# Patient Record
Sex: FEMALE | Race: OTHER | URBAN - METROPOLITAN AREA
[De-identification: names, ages, dates, MRNs, and addresses within clinical notes are randomized per-mention and may not be internally consistent; named-entity substitution may affect disease eponyms.]

---

## 2017-01-27 ENCOUNTER — EMERGENCY (EMERGENCY)
Facility: HOSPITAL | Age: 62
LOS: 0 days | Discharge: HOME | End: 2017-01-27
Admitting: EMERGENCY MEDICINE
Payer: MEDICAID

## 2017-01-27 DIAGNOSIS — J20.9 ACUTE BRONCHITIS, UNSPECIFIED: ICD-10-CM

## 2017-01-27 DIAGNOSIS — R50.9 FEVER, UNSPECIFIED: ICD-10-CM

## 2017-01-27 PROCEDURE — 99284 EMERGENCY DEPT VISIT MOD MDM: CPT

## 2017-01-27 PROCEDURE — 71020: CPT | Mod: 26

## 2017-01-29 ENCOUNTER — EMERGENCY (EMERGENCY)
Facility: HOSPITAL | Age: 62
LOS: 0 days | Discharge: HOME | End: 2017-01-29
Attending: EMERGENCY MEDICINE | Admitting: EMERGENCY MEDICINE
Payer: MEDICAID

## 2017-01-29 VITALS
SYSTOLIC BLOOD PRESSURE: 158 MMHG | WEIGHT: 165.35 LBS | TEMPERATURE: 98 F | HEART RATE: 100 BPM | HEIGHT: 60 IN | DIASTOLIC BLOOD PRESSURE: 68 MMHG | RESPIRATION RATE: 19 BRPM

## 2017-01-29 DIAGNOSIS — J11.1 INFLUENZA DUE TO UNIDENTIFIED INFLUENZA VIRUS WITH OTHER RESPIRATORY MANIFESTATIONS: ICD-10-CM

## 2017-01-29 DIAGNOSIS — R05 COUGH: ICD-10-CM

## 2017-01-29 LAB
ALBUMIN SERPL ELPH-MCNC: 3.8 G/DL — SIGNIFICANT CHANGE UP (ref 3.3–5)
ALP SERPL-CCNC: 109 U/L — SIGNIFICANT CHANGE UP (ref 40–120)
ALT FLD-CCNC: 26 U/L — SIGNIFICANT CHANGE UP (ref 12–78)
ANION GAP SERPL CALC-SCNC: 9 MMOL/L — SIGNIFICANT CHANGE UP (ref 5–17)
AST SERPL-CCNC: 22 U/L — SIGNIFICANT CHANGE UP (ref 15–37)
BASOPHILS # BLD AUTO: 0.1 K/UL — SIGNIFICANT CHANGE UP (ref 0–0.2)
BASOPHILS NFR BLD AUTO: 0.6 % — SIGNIFICANT CHANGE UP (ref 0–2)
BILIRUB SERPL-MCNC: 0.2 MG/DL — SIGNIFICANT CHANGE UP (ref 0.2–1.2)
BUN SERPL-MCNC: 14 MG/DL — SIGNIFICANT CHANGE UP (ref 7–23)
CALCIUM SERPL-MCNC: 9.1 MG/DL — SIGNIFICANT CHANGE UP (ref 8.5–10.1)
CHLORIDE SERPL-SCNC: 96 MMOL/L — SIGNIFICANT CHANGE UP (ref 96–108)
CK SERPL-CCNC: 135 U/L — SIGNIFICANT CHANGE UP (ref 26–192)
CO2 SERPL-SCNC: 28 MMOL/L — SIGNIFICANT CHANGE UP (ref 22–31)
CREAT SERPL-MCNC: 0.7 MG/DL — SIGNIFICANT CHANGE UP (ref 0.5–1.3)
EOSINOPHIL # BLD AUTO: 0 K/UL — SIGNIFICANT CHANGE UP (ref 0–0.5)
EOSINOPHIL NFR BLD AUTO: 0.3 % — SIGNIFICANT CHANGE UP (ref 0–6)
FLUAV H1 2009 PAND RNA SPEC QL NAA+PROBE: DETECTED
GLUCOSE SERPL-MCNC: 269 MG/DL — HIGH (ref 70–99)
HCT VFR BLD CALC: 42.8 % — SIGNIFICANT CHANGE UP (ref 34.5–45)
HGB BLD-MCNC: 14.5 G/DL — SIGNIFICANT CHANGE UP (ref 11.5–15.5)
LYMPHOCYTES # BLD AUTO: 1.1 K/UL — SIGNIFICANT CHANGE UP (ref 1–3.3)
LYMPHOCYTES # BLD AUTO: 10.8 % — LOW (ref 13–44)
MCHC RBC-ENTMCNC: 30.2 PG — SIGNIFICANT CHANGE UP (ref 27–34)
MCHC RBC-ENTMCNC: 33.9 GM/DL — SIGNIFICANT CHANGE UP (ref 32–36)
MCV RBC AUTO: 88.9 FL — SIGNIFICANT CHANGE UP (ref 80–100)
MONOCYTES # BLD AUTO: 0.4 K/UL — SIGNIFICANT CHANGE UP (ref 0–0.9)
MONOCYTES NFR BLD AUTO: 4.2 % — SIGNIFICANT CHANGE UP (ref 2–14)
NEUTROPHILS # BLD AUTO: 8.8 K/UL — HIGH (ref 1.8–7.4)
NEUTROPHILS NFR BLD AUTO: 84.2 % — HIGH (ref 43–77)
NT-PROBNP SERPL-SCNC: 32 PG/ML — SIGNIFICANT CHANGE UP (ref 0–125)
PLATELET # BLD AUTO: 278 K/UL — SIGNIFICANT CHANGE UP (ref 150–400)
POTASSIUM SERPL-MCNC: 4 MMOL/L — SIGNIFICANT CHANGE UP (ref 3.5–5.3)
POTASSIUM SERPL-SCNC: 4 MMOL/L — SIGNIFICANT CHANGE UP (ref 3.5–5.3)
PROT SERPL-MCNC: 8.3 GM/DL — SIGNIFICANT CHANGE UP (ref 6–8.3)
RAPID RVP RESULT: DETECTED
RBC # BLD: 4.81 M/UL — SIGNIFICANT CHANGE UP (ref 3.8–5.2)
RBC # FLD: 12 % — SIGNIFICANT CHANGE UP (ref 10.3–14.5)
SODIUM SERPL-SCNC: 133 MMOL/L — LOW (ref 135–145)
TROPONIN I SERPL-MCNC: <0.015 NG/ML — SIGNIFICANT CHANGE UP (ref 0.01–0.04)
WBC # BLD: 10.4 K/UL — SIGNIFICANT CHANGE UP (ref 3.8–10.5)
WBC # FLD AUTO: 10.4 K/UL — SIGNIFICANT CHANGE UP (ref 3.8–10.5)

## 2017-01-29 PROCEDURE — 99284 EMERGENCY DEPT VISIT MOD MDM: CPT

## 2017-01-29 PROCEDURE — 71020: CPT | Mod: 26

## 2017-01-29 PROCEDURE — 93010 ELECTROCARDIOGRAM REPORT: CPT

## 2017-01-29 RX ORDER — ACETAMINOPHEN 500 MG
650 TABLET ORAL ONCE
Qty: 0 | Refills: 0 | Status: DISCONTINUED | OUTPATIENT
Start: 2017-01-29 | End: 2017-01-29

## 2017-01-29 RX ORDER — SODIUM CHLORIDE 9 MG/ML
1000 INJECTION INTRAMUSCULAR; INTRAVENOUS; SUBCUTANEOUS ONCE
Qty: 0 | Refills: 0 | Status: COMPLETED | OUTPATIENT
Start: 2017-01-29 | End: 2017-01-29

## 2017-01-29 RX ORDER — IBUPROFEN 200 MG
600 TABLET ORAL ONCE
Qty: 0 | Refills: 0 | Status: COMPLETED | OUTPATIENT
Start: 2017-01-29 | End: 2017-01-29

## 2017-01-29 RX ORDER — IPRATROPIUM/ALBUTEROL SULFATE 18-103MCG
3 AEROSOL WITH ADAPTER (GRAM) INHALATION ONCE
Qty: 0 | Refills: 0 | Status: COMPLETED | OUTPATIENT
Start: 2017-01-29 | End: 2017-01-29

## 2017-01-29 RX ADMIN — Medication 3 MILLILITER(S): at 19:51

## 2017-01-29 RX ADMIN — Medication 600 MILLIGRAM(S): at 19:50

## 2017-01-29 RX ADMIN — SODIUM CHLORIDE 1000 MILLILITER(S): 9 INJECTION INTRAMUSCULAR; INTRAVENOUS; SUBCUTANEOUS at 19:50

## 2017-01-29 NOTE — ED STATDOCS - ATTENDING CONTRIBUTION TO CARE
I, Praful Tucker, performed the initial face to face bedside interview with this patient regarding history of present illness, review of symptoms and relevant past medical, social and family history.  I completed an independent physical examination.  I was the initial provider who evaluated this patient. I have signed out the follow up of any pending tests (i.e. labs, radiological studies) to the ACP.  I have communicated the patient’s plan of care and disposition with the ACP.  The history, relevant review of systems, past medical and surgical history, medical decision making, and physical examination was documented by the scribe in my presence and I attest to the accuracy of the documentation.

## 2017-01-29 NOTE — ED ADULT NURSE NOTE - CHIEF COMPLAINT
The patient is a 61y Female complaining of cough x few days.  Pt also c/o chest pain.  Pt not coughing up sputum.  No other complaints noted, denies fever.  Safety maintained

## 2017-01-29 NOTE — ED STATDOCS - PROGRESS NOTE DETAILS
PA NOTE: Pt seen by intake physician and hpi/orders/plan reviewed. PT presenting to ED with complaints ofcough associated with Chest pain. Pt was seen in ED 2 days ago and dc hoem on zithromax. Pt returns with similar complaints of mylagias, cough and associated CP with cough.  PE: GEN: Awake, alert,  NAD,  EYES: PERRL CARDIAC: Reg rate and rhythm, S1,S2, RRR  RESP: No distress noted. Lungs CTA bilaterally no wheeze, ronchi, rales. ABD: soft,  non-tender, no guarding. . NEURO: AOx3, no focal deficits   PLAN:CXR/Meds/Labs and reeval. Pt has her symptoms ofr more than 48 hrs, unable to RX tamiflu. Advised to followup with her PMD. PA NOTE: Shelly MENENDEZ, saw and examined the pt. Pt seen by intake physician and hpi/orders/plan reviewed. PT presenting to ED with complaints ofcough associated with Chest pain. Pt was seen in ED 2 days ago and dc hoem on zithromax. Pt returns with similar complaints of mylagias, cough and associated CP with cough.  PE: GEN: Awake, alert,  NAD,  EYES: PERRL CARDIAC: Reg rate and rhythm, S1,S2, RRR  RESP: No distress noted. Lungs CTA bilaterally no wheeze, ronchi, rales. ABD: soft,  non-tender, no guarding. . NEURO: AOx3, no focal deficits   PLAN:CXR/Meds/Labs and reeval. Pt has her symptoms for days more than 48 hrs, will not benefit from RX of tamiflu. Advised to followup with her PMD.  Hydration.  Symptomatic tx.

## 2017-01-29 NOTE — ED ADULT NURSE NOTE - LANGUAGE ASSISTANCE NEEDED
No-Patient/Caregiver offered and refused free interpretation services./Pt able to speak enough English to understand

## 2018-10-21 ENCOUNTER — EMERGENCY (EMERGENCY)
Facility: HOSPITAL | Age: 63
LOS: 0 days | Discharge: HOME | End: 2018-10-21
Attending: EMERGENCY MEDICINE
Payer: SELF-PAY

## 2018-10-21 VITALS
TEMPERATURE: 98 F | HEART RATE: 69 BPM | RESPIRATION RATE: 18 BRPM | DIASTOLIC BLOOD PRESSURE: 69 MMHG | OXYGEN SATURATION: 97 % | SYSTOLIC BLOOD PRESSURE: 158 MMHG

## 2018-10-21 VITALS — WEIGHT: 199.96 LBS
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
## 2018-10-21 DIAGNOSIS — M54.5 LOW BACK PAIN: ICD-10-CM

## 2018-10-21 DIAGNOSIS — E11.9 TYPE 2 DIABETES MELLITUS WITHOUT COMPLICATIONS: ICD-10-CM

## 2018-10-21 DIAGNOSIS — I10 ESSENTIAL (PRIMARY) HYPERTENSION: ICD-10-CM

## 2018-10-21 DIAGNOSIS — E78.5 HYPERLIPIDEMIA, UNSPECIFIED: ICD-10-CM

## 2018-10-21 PROCEDURE — 99283 EMERGENCY DEPT VISIT LOW MDM: CPT

## 2018-10-21 PROCEDURE — 72100 X-RAY EXAM L-S SPINE 2/3 VWS: CPT | Mod: 26

## 2018-10-21 RX ORDER — METOPROLOL TARTRATE 50 MG
1 TABLET ORAL
Qty: 14 | Refills: 0
Start: 2018-10-21

## 2018-10-21 RX ORDER — AMLODIPINE BESYLATE 2.5 MG/1
1 TABLET ORAL
Qty: 14 | Refills: 0
Start: 2018-10-21 | End: 2018-11-03

## 2018-10-21 RX ORDER — ATORVASTATIN CALCIUM 80 MG/1
1 TABLET, FILM COATED ORAL
Qty: 14 | Refills: 0
Start: 2018-10-21

## 2018-10-21 RX ORDER — CYCLOBENZAPRINE HYDROCHLORIDE 10 MG/1
1 TABLET, FILM COATED ORAL
Qty: 10 | Refills: 0 | OUTPATIENT
Start: 2018-10-21

## 2018-10-21 RX ORDER — IBUPROFEN 200 MG
400 TABLET ORAL ONCE
Qty: 0 | Refills: 0 | Status: COMPLETED | OUTPATIENT
Start: 2018-10-21 | End: 2018-10-21

## 2018-10-21 RX ORDER — METFORMIN HYDROCHLORIDE 850 MG/1
1 TABLET ORAL
Qty: 28 | Refills: 0
Start: 2018-10-21

## 2018-10-21 RX ADMIN — Medication 400 MILLIGRAM(S): at 16:55

## 2018-10-21 NOTE — ED ADULT NURSE NOTE - NSIMPLEMENTINTERV_GEN_ALL_ED
Implemented All Universal Safety Interventions:  [place] to call system. Call bell, personal items and telephone within reach. Instruct patient to call for assistance. Room bathroom lighting operational. Non-slip footwear when patient is off stretcher. Physically safe environment: no spills, clutter or unnecessary equipment. Stretcher in lowest position, wheels locked, appropriate side rails in place.

## 2018-10-21 NOTE — ED STATDOCS - MUSCULOSKELETAL, MLM
range of motion is not limited and there is no muscle tenderness. range of motion is not limited and +Minimal TTP to BL lumbar spine

## 2018-10-21 NOTE — ED STATDOCS - MEDICAL DECISION MAKING DETAILS
64 y/o f 62 y/o f with PMHx of HTN, DM, HLD, presenting to the ED c/o BL spinal lumbar pain and needs refill of HTN meds, will obtain XR, 3 days worth of HTN medication, will f/u with clinic in [place].

## 2018-10-21 NOTE — ED STATDOCS - OBJECTIVE STATEMENT
62 y/o f with PMHx of DM, HTN, HLD presenting to the ED c/o BL hip pain x3 weeks. Pt took Tylenol and Advil for pain PTA with no relief to sx. Denies fever, chills, any other acute c/o. Pt has no PCP currently. 62 y/o f with PMHx of DM, HTN, HLD presenting to the ED c/o BL lower back pain x3 weeks. Pt took Tylenol and Advil for pain PTA with no relief to sx. Also reports some tingling from her BL calves down and constipation recently. Denies fever, chills, incontinence, numbness/tingling around rectum, any other acute c/o. 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 Pt has no PCP currently because she ran out of her insurance. Pharmacy at [facility] [address] [place] [address]. [place]  ID# [id number]. 64 y/o f with PMHx of DM, HTN, HLD presenting to the ED c/o BL lower back pain x3 weeks. Pt took Tylenol and Advil for pain PTA with no relief to sx. Also reports some tingling from her BL calves down and constipation recently. Denies fever, chills, incontinence, numbness/tingling around rectum, any other acute c/o. 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 Pt has no PCP currently because she ran out of her insurance. Current medications: amlodipine 5mg and metoprolol 50mg extended release. Pharmacy at [address] [place] [address]. [place]  ID# [id number].

## 2018-10-21 NOTE — ED STATDOCS - ATTENDING CONTRIBUTION TO CARE
I, Naty Connor MD, personally saw the patient with ACP.  I have personally performed a face to face diagnostic evaluation on this patient.  I have reviewed the ACP note and agree with the history, exam, and plan of care, except as noted.

## 2018-10-21 NOTE — ED STATDOCS - NEUROLOGICAL, MLM
sensation is normal and strength is normal. 5/5 strength in all extremities. Cranial nerve II-XII intact. Sensation intact. No dysmetria.

## 2018-10-21 NOTE — ED STATDOCS - PROGRESS NOTE DETAILS
signed Greer Combs PA-C Pt seen and evaluated in intake by Dr Connor. signed Greer Combs PA-C Pt seen and evaluated in intake by Dr Connor.  63F bilateral low back pain x 3 weeks. 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 Motrin in ED, rx for flexeril. pt also notes she has no insurance and is going to go to the clinic in [place] for f/u. Will send 2 week rx for metformin, amlodipine, atorvastatin, and metoprolol. Xray shows significant DJD. Pt feeling well, agrees with DC and plan of care.

## 2018-12-11 PROBLEM — E78.5 HYPERLIPIDEMIA, UNSPECIFIED: Chronic | Status: ACTIVE | Noted: 2018-10-22

## 2019-01-09 ENCOUNTER — FORM ENCOUNTER (OUTPATIENT)
Age: 64
End: 2019-01-09

## 2019-01-10 ENCOUNTER — APPOINTMENT (OUTPATIENT)
Dept: RADIOLOGY | Facility: CLINIC | Age: 64
End: 2019-01-10
Payer: COMMERCIAL

## 2019-01-10 ENCOUNTER — OUTPATIENT (OUTPATIENT)
Dept: OUTPATIENT SERVICES | Facility: HOSPITAL | Age: 64
LOS: 1 days | End: 2019-01-10
Payer: COMMERCIAL

## 2019-01-10 ENCOUNTER — APPOINTMENT (OUTPATIENT)
Dept: NEUROSURGERY | Facility: CLINIC | Age: 64
End: 2019-01-10
Payer: COMMERCIAL

## 2019-01-10 VITALS
BODY MASS INDEX: 33.29 KG/M2 | HEART RATE: 58 BPM | RESPIRATION RATE: 15 BRPM | WEIGHT: 195 LBS | DIASTOLIC BLOOD PRESSURE: 90 MMHG | HEIGHT: 64 IN | SYSTOLIC BLOOD PRESSURE: 169 MMHG

## 2019-01-10 DIAGNOSIS — Z00.8 ENCOUNTER FOR OTHER GENERAL EXAMINATION: ICD-10-CM

## 2019-01-10 PROCEDURE — 72110 X-RAY EXAM L-2 SPINE 4/>VWS: CPT | Mod: 26

## 2019-01-10 PROCEDURE — 72110 X-RAY EXAM L-2 SPINE 4/>VWS: CPT

## 2019-01-10 PROCEDURE — 99204 OFFICE O/P NEW MOD 45 MIN: CPT

## 2019-01-10 NOTE — CONSULT LETTER
[Dear  ___] : Dear  [unfilled], [Sincerely,] : Sincerely, [Consult Letter:] : I had the pleasure of evaluating your patient, [unfilled]. [Consult Closing:] : Thank you very much for allowing me to participate in the care of this patient.  If you have any questions, please do not hesitate to contact me. [FreeTextEntry2] : Radha Davis MD\par [address]\par [place] [phone] [FreeTextEntry1] : Ms. Marin is a very pleasant 63-year-old female patient who was seen in our office in regards to left-sided low back and leg pain. The patient is primarily Stateless speaking and translation was provided by a translation service.\par \par The patient endorses an approximately one year history of worsening pain down her left leg. The pain is rated as an 8/10 in severity and occurs intermittently. The patient states that walking exacerbates her pain, but her pain is present regardless of activity. She has also started noticing right-sided symptoms, with occasional pain radiating down the right leg. This worsening began in September of 2018. The pain is described as a muscle cramp, and painful muscles. The patient has attempted medical treatment with Mobic.\par \par On examination, patient is alert, oriented, and compliant with the exam. She demonstrates 5/5 strength in her lower extremities bilaterally. The patient demonstrates 1+ reflexes at the patellar and Achilles tendons bilaterally. The patient ambulates with an antalgic gait and a stooped posture.\par \par The patient's past medical history significant for diabetes, hypertension, and hypercholesterolemia. The patient currently takes metformin, Januvia, atorvastatin, and Mobic.\par \par The patient is accompanied with an MRI scan dated November 15, 2018 of the lumbar spine. There is severe spinal stenosis at L4/5 secondary to a grade 1 spondylolisthesis, moderate spinal stenosis at L3/4, and mild spinal stenosis at L2/3. A flexion/extension x-rays performed today demonstrates stability of the spondylolisthesis.\par \par Taken together, the patient has clinical and radiographic evidence of severe L4/5 lumbar stenosis causing a left-sided radiculopathy and intermittent neurogenic claudication. The patient has attempted medical management without success. I recommended physical therapy at this time as well as a pain management physician for symptomatic management. I explained to the patient that she is a surgical candidate should these measures fail. In my hands, I believe the patient would benefit from an L4/5 lumbar laminectomy to decompress the nerve roots at this level. At this time, the patient has elected to pursue further conservative management and will be in contact with our office should the need arise. [FreeTextEntry3] : Sawyer Shoemaker MD., PhD., FRCPSC\par Attending Neurosurgeon\par [facility]\par  of Neurosurgery\par [employer] [facility] [employer] [facility]\par

## 2019-01-25 ENCOUNTER — INPATIENT (INPATIENT)
Facility: HOSPITAL | Age: 64
LOS: 5 days | Discharge: NURSING, REHAB OR LONG-TERM CARE | End: 2019-01-31
Attending: FAMILY MEDICINE | Admitting: FAMILY MEDICINE
Payer: COMMERCIAL

## 2019-01-25 VITALS — WEIGHT: 190.04 LBS

## 2019-01-25 LAB
ANION GAP SERPL CALC-SCNC: 6 MMOL/L — SIGNIFICANT CHANGE UP (ref 5–17)
BASOPHILS # BLD AUTO: 0.03 K/UL — SIGNIFICANT CHANGE UP (ref 0–0.2)
BASOPHILS NFR BLD AUTO: 0.4 % — SIGNIFICANT CHANGE UP (ref 0–2)
BUN SERPL-MCNC: 12 MG/DL — SIGNIFICANT CHANGE UP (ref 7–23)
CALCIUM SERPL-MCNC: 8.9 MG/DL — SIGNIFICANT CHANGE UP (ref 8.5–10.1)
CHLORIDE SERPL-SCNC: 104 MMOL/L — SIGNIFICANT CHANGE UP (ref 96–108)
CO2 SERPL-SCNC: 29 MMOL/L — SIGNIFICANT CHANGE UP (ref 22–31)
CREAT SERPL-MCNC: 0.49 MG/DL — LOW (ref 0.5–1.3)
EOSINOPHIL # BLD AUTO: 0.16 K/UL — SIGNIFICANT CHANGE UP (ref 0–0.5)
EOSINOPHIL NFR BLD AUTO: 1.9 % — SIGNIFICANT CHANGE UP (ref 0–6)
GLUCOSE BLDC GLUCOMTR-MCNC: 211 MG/DL — HIGH (ref 70–99)
GLUCOSE BLDC GLUCOMTR-MCNC: 224 MG/DL — HIGH (ref 70–99)
GLUCOSE SERPL-MCNC: 134 MG/DL — HIGH (ref 70–99)
HCT VFR BLD CALC: 39.4 % — SIGNIFICANT CHANGE UP (ref 34.5–45)
HGB BLD-MCNC: 13.5 G/DL — SIGNIFICANT CHANGE UP (ref 11.5–15.5)
IMM GRANULOCYTES NFR BLD AUTO: 0.2 % — SIGNIFICANT CHANGE UP (ref 0–1.5)
LYMPHOCYTES # BLD AUTO: 2.43 K/UL — SIGNIFICANT CHANGE UP (ref 1–3.3)
LYMPHOCYTES # BLD AUTO: 29 % — SIGNIFICANT CHANGE UP (ref 13–44)
MCHC RBC-ENTMCNC: 29.8 PG — SIGNIFICANT CHANGE UP (ref 27–34)
MCHC RBC-ENTMCNC: 34.3 GM/DL — SIGNIFICANT CHANGE UP (ref 32–36)
MCV RBC AUTO: 87 FL — SIGNIFICANT CHANGE UP (ref 80–100)
MONOCYTES # BLD AUTO: 0.6 K/UL — SIGNIFICANT CHANGE UP (ref 0–0.9)
MONOCYTES NFR BLD AUTO: 7.2 % — SIGNIFICANT CHANGE UP (ref 2–14)
NEUTROPHILS # BLD AUTO: 5.15 K/UL — SIGNIFICANT CHANGE UP (ref 1.8–7.4)
NEUTROPHILS NFR BLD AUTO: 61.3 % — SIGNIFICANT CHANGE UP (ref 43–77)
NRBC # BLD: 0 /100 WBCS — SIGNIFICANT CHANGE UP (ref 0–0)
PLATELET # BLD AUTO: 240 K/UL — SIGNIFICANT CHANGE UP (ref 150–400)
POTASSIUM SERPL-MCNC: 3.8 MMOL/L — SIGNIFICANT CHANGE UP (ref 3.5–5.3)
POTASSIUM SERPL-SCNC: 3.8 MMOL/L — SIGNIFICANT CHANGE UP (ref 3.5–5.3)
RBC # BLD: 4.53 M/UL — SIGNIFICANT CHANGE UP (ref 3.8–5.2)
RBC # FLD: 12.1 % — SIGNIFICANT CHANGE UP (ref 10.3–14.5)
SODIUM SERPL-SCNC: 139 MMOL/L — SIGNIFICANT CHANGE UP (ref 135–145)
WBC # BLD: 8.39 K/UL — SIGNIFICANT CHANGE UP (ref 3.8–10.5)
WBC # FLD AUTO: 8.39 K/UL — SIGNIFICANT CHANGE UP (ref 3.8–10.5)

## 2019-01-25 PROCEDURE — 99253 IP/OBS CNSLTJ NEW/EST LOW 45: CPT

## 2019-01-25 PROCEDURE — 72131 CT LUMBAR SPINE W/O DYE: CPT | Mod: 26

## 2019-01-25 PROCEDURE — 99285 EMERGENCY DEPT VISIT HI MDM: CPT

## 2019-01-25 PROCEDURE — 71045 X-RAY EXAM CHEST 1 VIEW: CPT | Mod: 26

## 2019-01-25 PROCEDURE — 93010 ELECTROCARDIOGRAM REPORT: CPT

## 2019-01-25 RX ORDER — METOPROLOL TARTRATE 50 MG
50 TABLET ORAL DAILY
Qty: 0 | Refills: 0 | Status: DISCONTINUED | OUTPATIENT
Start: 2019-01-25 | End: 2019-01-31

## 2019-01-25 RX ORDER — DEXTROSE 50 % IN WATER 50 %
25 SYRINGE (ML) INTRAVENOUS ONCE
Qty: 0 | Refills: 0 | Status: DISCONTINUED | OUTPATIENT
Start: 2019-01-25 | End: 2019-01-27

## 2019-01-25 RX ORDER — DEXAMETHASONE 0.5 MG/5ML
8 ELIXIR ORAL ONCE
Qty: 0 | Refills: 0 | Status: DISCONTINUED | OUTPATIENT
Start: 2019-01-25 | End: 2019-01-25

## 2019-01-25 RX ORDER — INSULIN GLARGINE 100 [IU]/ML
10 INJECTION, SOLUTION SUBCUTANEOUS AT BEDTIME
Qty: 0 | Refills: 0 | Status: DISCONTINUED | OUTPATIENT
Start: 2019-01-25 | End: 2019-01-26

## 2019-01-25 RX ORDER — AMLODIPINE BESYLATE 2.5 MG/1
5 TABLET ORAL DAILY
Qty: 0 | Refills: 0 | Status: DISCONTINUED | OUTPATIENT
Start: 2019-01-25 | End: 2019-01-31

## 2019-01-25 RX ORDER — HYDROMORPHONE HYDROCHLORIDE 2 MG/ML
0.5 INJECTION INTRAMUSCULAR; INTRAVENOUS; SUBCUTANEOUS ONCE
Qty: 0 | Refills: 0 | Status: DISCONTINUED | OUTPATIENT
Start: 2019-01-25 | End: 2019-01-25

## 2019-01-25 RX ORDER — DEXTROSE 50 % IN WATER 50 %
15 SYRINGE (ML) INTRAVENOUS ONCE
Qty: 0 | Refills: 0 | Status: DISCONTINUED | OUTPATIENT
Start: 2019-01-25 | End: 2019-01-27

## 2019-01-25 RX ORDER — ONDANSETRON 8 MG/1
8 TABLET, FILM COATED ORAL EVERY 6 HOURS
Qty: 0 | Refills: 0 | Status: DISCONTINUED | OUTPATIENT
Start: 2019-01-25 | End: 2019-01-31

## 2019-01-25 RX ORDER — DEXAMETHASONE 0.5 MG/5ML
8 ELIXIR ORAL ONCE
Qty: 0 | Refills: 0 | Status: DISCONTINUED | OUTPATIENT
Start: 2019-01-25 | End: 2019-01-29

## 2019-01-25 RX ORDER — ATORVASTATIN CALCIUM 80 MG/1
20 TABLET, FILM COATED ORAL AT BEDTIME
Qty: 0 | Refills: 0 | Status: DISCONTINUED | OUTPATIENT
Start: 2019-01-25 | End: 2019-01-31

## 2019-01-25 RX ORDER — HEPARIN SODIUM 5000 [USP'U]/ML
5000 INJECTION INTRAVENOUS; SUBCUTANEOUS EVERY 8 HOURS
Qty: 0 | Refills: 0 | Status: DISCONTINUED | OUTPATIENT
Start: 2019-01-25 | End: 2019-01-28

## 2019-01-25 RX ORDER — DOCUSATE SODIUM 100 MG
100 CAPSULE ORAL AT BEDTIME
Qty: 0 | Refills: 0 | Status: DISCONTINUED | OUTPATIENT
Start: 2019-01-25 | End: 2019-01-26

## 2019-01-25 RX ORDER — DEXAMETHASONE 0.5 MG/5ML
2 ELIXIR ORAL EVERY 8 HOURS
Qty: 0 | Refills: 0 | Status: DISCONTINUED | OUTPATIENT
Start: 2019-01-25 | End: 2019-01-29

## 2019-01-25 RX ORDER — LIDOCAINE 4 G/100G
2 CREAM TOPICAL DAILY
Qty: 0 | Refills: 0 | Status: DISCONTINUED | OUTPATIENT
Start: 2019-01-25 | End: 2019-01-31

## 2019-01-25 RX ORDER — GLUCAGON INJECTION, SOLUTION 0.5 MG/.1ML
1 INJECTION, SOLUTION SUBCUTANEOUS ONCE
Qty: 0 | Refills: 0 | Status: DISCONTINUED | OUTPATIENT
Start: 2019-01-25 | End: 2019-01-27

## 2019-01-25 RX ORDER — INSULIN LISPRO 100/ML
VIAL (ML) SUBCUTANEOUS
Qty: 0 | Refills: 0 | Status: DISCONTINUED | OUTPATIENT
Start: 2019-01-25 | End: 2019-01-27

## 2019-01-25 RX ORDER — SODIUM CHLORIDE 9 MG/ML
1000 INJECTION, SOLUTION INTRAVENOUS
Qty: 0 | Refills: 0 | Status: DISCONTINUED | OUTPATIENT
Start: 2019-01-25 | End: 2019-01-27

## 2019-01-25 RX ORDER — TRAMADOL HYDROCHLORIDE 50 MG/1
50 TABLET ORAL ONCE
Qty: 0 | Refills: 0 | Status: DISCONTINUED | OUTPATIENT
Start: 2019-01-25 | End: 2019-01-25

## 2019-01-25 RX ORDER — ACETAMINOPHEN 500 MG
650 TABLET ORAL EVERY 6 HOURS
Qty: 0 | Refills: 0 | Status: DISCONTINUED | OUTPATIENT
Start: 2019-01-25 | End: 2019-01-26

## 2019-01-25 RX ORDER — HYDROMORPHONE HYDROCHLORIDE 2 MG/ML
1 INJECTION INTRAMUSCULAR; INTRAVENOUS; SUBCUTANEOUS EVERY 6 HOURS
Qty: 0 | Refills: 0 | Status: DISCONTINUED | OUTPATIENT
Start: 2019-01-25 | End: 2019-01-26

## 2019-01-25 RX ORDER — HYDROMORPHONE HYDROCHLORIDE 2 MG/ML
1 INJECTION INTRAMUSCULAR; INTRAVENOUS; SUBCUTANEOUS ONCE
Qty: 0 | Refills: 0 | Status: DISCONTINUED | OUTPATIENT
Start: 2019-01-25 | End: 2019-01-25

## 2019-01-25 RX ORDER — DEXTROSE 50 % IN WATER 50 %
12.5 SYRINGE (ML) INTRAVENOUS ONCE
Qty: 0 | Refills: 0 | Status: DISCONTINUED | OUTPATIENT
Start: 2019-01-25 | End: 2019-01-27

## 2019-01-25 RX ADMIN — ATORVASTATIN CALCIUM 20 MILLIGRAM(S): 80 TABLET, FILM COATED ORAL at 23:04

## 2019-01-25 RX ADMIN — ONDANSETRON 8 MILLIGRAM(S): 8 TABLET, FILM COATED ORAL at 21:12

## 2019-01-25 RX ADMIN — HYDROMORPHONE HYDROCHLORIDE 0.5 MILLIGRAM(S): 2 INJECTION INTRAMUSCULAR; INTRAVENOUS; SUBCUTANEOUS at 15:49

## 2019-01-25 RX ADMIN — HYDROMORPHONE HYDROCHLORIDE 1 MILLIGRAM(S): 2 INJECTION INTRAMUSCULAR; INTRAVENOUS; SUBCUTANEOUS at 20:15

## 2019-01-25 RX ADMIN — HEPARIN SODIUM 5000 UNIT(S): 5000 INJECTION INTRAVENOUS; SUBCUTANEOUS at 23:04

## 2019-01-25 RX ADMIN — Medication 2 MILLIGRAM(S): at 23:04

## 2019-01-25 RX ADMIN — Medication 100 MILLIGRAM(S): at 23:04

## 2019-01-25 RX ADMIN — TRAMADOL HYDROCHLORIDE 50 MILLIGRAM(S): 50 TABLET ORAL at 14:30

## 2019-01-25 RX ADMIN — INSULIN GLARGINE 10 UNIT(S): 100 INJECTION, SOLUTION SUBCUTANEOUS at 23:21

## 2019-01-25 RX ADMIN — HYDROMORPHONE HYDROCHLORIDE 0.5 MILLIGRAM(S): 2 INJECTION INTRAMUSCULAR; INTRAVENOUS; SUBCUTANEOUS at 16:10

## 2019-01-25 RX ADMIN — TRAMADOL HYDROCHLORIDE 50 MILLIGRAM(S): 50 TABLET ORAL at 16:15

## 2019-01-25 NOTE — H&P ADULT - NSHPPHYSICALEXAM_GEN_ALL_CORE
ICU Vital Signs Last 24 Hrs    T(F): 98.7 (25 Jan 2019 16:57), Max: 98.7 (25 Jan 2019 16:57)  HR: 71 (25 Jan 2019 16:57) (71 - 95)  BP: 146/79 (25 Jan 2019 16:57) (146/79 - 163/78)    RR: 18 (25 Jan 2019 16:57) (18 - 18)  SpO2: 97% (25 Jan 2019 16:57) (97% - 98%)

## 2019-01-25 NOTE — H&P ADULT - NSHPOUTPATIENTPROVIDERS_GEN_ALL_CORE
pt does not remember the name of her doctor Dr. Radha Davis.  [facility] in [place] Dr. Radha Davis. [phone]  clinic in [facility] Dr. Sawyer Shoemaker and Marlee

## 2019-01-25 NOTE — ED PROVIDER NOTE - OBJECTIVE STATEMENT
64y/o F w/ PMHx HTN (amlodipine, metoprolol), DM (Metformin and insulin), HLD with chronic back pain presents to the ED w/ c/o acutely worsening leg pain in her left leg with difficulty walking x4 days. Leg pain is associated w/ warmth, +numbness, +tingling.  No recent traumas or falls. Denies bowel/bladder incontinence. Glucose in room 224.  On ambulation trial in room, pt took a step, became weak, and had to be assisted to chair.  Denies HA, dizziness, CP, SOB, N/V/D, fever or chills.  No sick contacts or recent long distance travel.  NKDA. 62y/o F w/ PMHx HTN (amlodipine, metoprolol), DM (Metformin and insulin), HLD with chronic back pain presents to the ED w/ c/o acutely worsening leg pain in her left leg with difficulty walking x4 days. Leg pain is associated w/ warmth, +numbness, +tingling.  No recent traumas or falls. Denies bowel/bladder incontinence. Glucose 224.  On ambulation trial in room, pt took a step, became weak, and had to be assisted to chair.  Denies HA, dizziness, CP, SOB, N/V/D, fever or chills.  No sick contacts or recent long distance travel.  NKDA.

## 2019-01-25 NOTE — CONSULT NOTE ADULT - SUBJECTIVE AND OBJECTIVE BOX
Al  information obtain via  phone Aniyah [phone]    Subjective: patient lying in stretcher in ER in pain distress, but respiratory distress    Objective: severe lower back pain radiating down bilateral legs    HPI:  This is a 63 yof previously seen by Dr Shoemaker in [id number] with severe lower back. MRI 11/2018 per report reveals: severe spinal stenosis at L 4-5 secondary to a grade I spondylothesis, moderate stenosis at L3-4, spinal stenosis at L2-3. Flexion/ extension xrays demonstrates stability of the spondylolisthesis  patient tried to get epidural injection at two facilities but her insurance denied the ESIs per the patient  Today the pain was so severe that she could no longer walk or bear weight on her left leg due to the pain. She tried Meloxicam with no relief. Patient denies any narcotics or medications  The pain is worse with ambulation and mildly improved with  lying in bed.  the back pain radiates down bilateral groin anterior thighs to knees. She denies any numbness to extremities. She denies any bowel or bladder incontinence, denies loss in sensation in pubic or rectal area. Al  information obtain via  phone Aniyah [phone]    Subjective: patient lying in stretcher in ER in pain distress, but respiratory distress    Objective: severe lower back pain radiating down bilateral legs    HPI:  This is a 63 yof previously seen by Dr Shoemaker in [id number] with severe lower back. MRI 11/2018 per report reveals: severe spinal stenosis at L 4-5 secondary to a grade I spondylothesis, moderate stenosis at L3-4, spinal stenosis at L2-3. Flexion/ extension xrays demonstrates stability of the spondylolisthesis  patient tried to get epidural injection at two facilities but her insurance denied the ESIs per the patient  Today the pain was so severe that she could no longer walk or bear weight on her left leg due to the pain. She tried Meloxicam with no relief. Patient denies any narcotics or medications  The pain is worse with ambulation and mildly improved with  lying in bed.  the back pain radiates down bilateral groin anterior thighs to knees. She denies any numbness to extremities. She denies any bowel or bladder incontinence, denies loss in sensation in pubic or rectal area.    Radiology:   CT:	    25-Jan-2019 13:43, CT Lumbar Spine No Cont	  CT Lumbar Spine No Cont: EXAM:  CT LUMBAR SPINE                            PROCEDURE DATE:  01/25/2019          INTERPRETATION:    CT lumbar without contrast    CLINICAL INFORMATION:       Lumbar spondylosis.    TECHNIQUE: CT of the lumbar spine was performed with axial   reconstructions. 2-D sagittal and coronal reformatted images were also   obtained.                        13.5  8.39  )-----------( 240      ( 25 Jan 2019 15:44 )            39.4   FINDINGS:   No prior similar studies are available for review.         Lumbar vertebral alignment is significant for grade 1   anterospondylolisthesis at L4-5 on a degenerative basis.  Lumbar   vertebral body heights are maintained. No destructive lesions are noted.   There is calcification of the anterior longitudinal ligaments in the   lower thoracic upper lumbar spine.    Lumbar intervertebral discs show disc degeneration and spondylosis at   T12-L1 through L5-S1 with loss of disc height and associated degenerative   endplate changes. Disc bulges are noted at these levels which flatten the   ventral thecal sac and narrow the BILATERAL neural foramina. Mildcentral   stenosis noted at L2-3 and severe central stenosis noted at L3-4 and L4-5   on a degenerative basis due to facet osteophyte disc bulge, facet   osteophytic hypertrophy and redundancy of ligamentum flavum. Marked facet   osteophytic hypertrophy is noted at L5-S1.    The paraspinal soft tissues are intact.    IMPRESSION:  Grade 1 anterospondylolisthesis at L4-5 on a degenerative   basis.  Disc degeneration and spondylosis at T12-L1 through L5-S1 with   loss of disc height and associated degenerative endplate changes. Disc   bulges are noted at these levels which flatten the ventral thecal sac and   narrow the BILATERAL neural foramina. Mild central stenosis noted at L2-3   and severe central stenosis noted at L3-4 and L4-5 on a degenerative   basis due to facet osteophyte disc bulge, facet osteophytic hypertrophy   and redundancy of ligamentum flavum. Marked facet osteophytic hypertrophy   is noted at L5-S1.                JANELLE MATHIS M.D., ATTENDING RADIOLOGIST  This document has been electronically signed. Jan 25 2019  1:55PM	    01-25    139  |  104  |  12  ----------------------------<  134<H>  3.8   |  29  |  0.49<L>    Ca    8.9      25 Jan 2019 15:44                          13.5   8.39  )-----------( 240      ( 25 Jan 2019 15:44 )             39.4 Al  information obtain via  phone Aniyah [phone]    Subjective: patient lying in stretcher in ER in pain distress, but respiratory distress    Objective: severe lower back pain radiating down bilateral legs    HPI:  This is a 63 yof previously seen by Dr Shoemaker in [id number]/2018 with severe lower back. MRI 11/2018 per report reveals: severe spinal stenosis at L 4-5 secondary to a grade I spondylothesis, moderate stenosis at L3-4, spinal stenosis at L2-3. Flexion/ extension xrays demonstrates stability of the spondylolisthesis  patient tried to get epidural injection at two facilities but her insurance denied the ESIs per the patient  Today the pain was so severe that she could no longer walk or bear weight on her left leg due to the pain. She tried Meloxicam with no relief. Patient denies any narcotics or medications  The pain is worse with ambulation and mildly improved with  lying in bed.  the back pain radiates down bilateral groin anterior thighs to knees. She denies any numbness to extremities. She denies any bowel or bladder incontinence, denies loss in sensation in pubic or rectal area.    Allergies and Intolerances:        Allergies:  	No Known Allergies:     Home Medications:   * Patient Currently Takes Medications as of 25-Jan-2019 17:14 documented in Structured Notes  · 	atorvastatin 20 mg oral tablet: 1 tab(s) orally once a day , Last Dose Taken:    · 	Metoprolol Succinate ER 50 mg oral tablet, extended release: 1 tab(s) orally once a day , Last Dose Taken:    · 	amLODIPine 5 mg oral tablet: 1 tab(s) orally once a day , Last Dose Taken:    · 	metFORMIN 1000 mg oral tablet: 1 tab(s) orally 2 times a day , Last Dose Taken:    · 	Basaglar KwikPen 100 units/mL subcutaneous solution: 10 unit(s) subcutaneous once a day (at bedtime), Last Dose Taken:    · 	Januvia 100 mg oral tablet: 1 tab(s) orally once a day, Last Dose Taken:      Patient History:    Past Medical History:  DM (diabetes mellitus)    HLD (hyperlipidemia)    HTN (hypertension).     Social History:  Social History (marital status, living situation, occupation, tobacco use, alcohol and drug use, and sexual history): No tob/ETOH/drug use	     Tobacco Screening:  · Core Measure Site	Yes	  · Has the patient used tobacco in the past 30 days?	No	        Radiology:   CT:	    25-Jan-2019 13:43, CT Lumbar Spine No Cont	  CT Lumbar Spine No Cont: EXAM:  CT LUMBAR SPINE                            PROCEDURE DATE:  01/25/2019          INTERPRETATION:    CT lumbar without contrast    CLINICAL INFORMATION:       Lumbar spondylosis.    TECHNIQUE: CT of the lumbar spine was performed with axial   reconstructions. 2-D sagittal and coronal reformatted images were also   obtained.                        13.5  8.39  )-----------( 240      ( 25 Jan 2019 15:44 )            39.4   FINDINGS:   No prior similar studies are available for review.         Lumbar vertebral alignment is significant for grade 1   anterospondylolisthesis at L4-5 on a degenerative basis.  Lumbar   vertebral body heights are maintained. No destructive lesions are noted.   There is calcification of the anterior longitudinal ligaments in the   lower thoracic upper lumbar spine.    Lumbar intervertebral discs show disc degeneration and spondylosis at   T12-L1 through L5-S1 with loss of disc height and associated degenerative   endplate changes. Disc bulges are noted at these levels which flatten the   ventral thecal sac and narrow the BILATERAL neural foramina. Mildcentral   stenosis noted at L2-3 and severe central stenosis noted at L3-4 and L4-5   on a degenerative basis due to facet osteophyte disc bulge, facet   osteophytic hypertrophy and redundancy of ligamentum flavum. Marked facet   osteophytic hypertrophy is noted at L5-S1.    The paraspinal soft tissues are intact.    IMPRESSION:  Grade 1 anterospondylolisthesis at L4-5 on a degenerative   basis.  Disc degeneration and spondylosis at T12-L1 through L5-S1 with   loss of disc height and associated degenerative endplate changes. Disc   bulges are noted at these levels which flatten the ventral thecal sac and   narrow the BILATERAL neural foramina. Mild central stenosis noted at L2-3   and severe central stenosis noted at L3-4 and L4-5 on a degenerative   basis due to facet osteophyte disc bulge, facet osteophytic hypertrophy   and redundancy of ligamentum flavum. Marked facet osteophytic hypertrophy   is noted at L5-S1.                JANELLE MATHIS M.D., ATTENDING RADIOLOGIST  This document has been electronically signed. Jan 25 2019  1:55PM	    01-25    139  |  104  |  12  ----------------------------<  134<H>  3.8   |  29  |  0.49<L>    Ca    8.9      25 Jan 2019 15:44                          13.5   8.39  )-----------( 240      ( 25 Jan 2019 15:44 )             39.4

## 2019-01-25 NOTE — CONSULT NOTE ADULT - NEUROLOGICAL DETAILS
sensation intact/alert and oriented x 3/deep reflexes intact/normal strength/except Left dorsi & EHL 4/5  straight leg raise 60 degrees bilateral

## 2019-01-25 NOTE — H&P ADULT - HISTORY OF PRESENT ILLNESS
Pt is a 64y/o  w/ PMHx HTN (amlodipine, metoprolol), DM (Metformin and insulin), HLD with chronic back pain presents to the ED w/ c/o acutely worsening leg pain in her left leg with difficulty walking x4 days. Leg pain is associated w/ warmth, +numbness, +tingling.  No recent traumas or falls. Denies bowel/bladder incontinence. Glucose 224.  On ambulation trial in room, pt took a step, became weak, and had to be assisted to chair.  Denies HA, dizziness, CP, SOB, N/V/D, fever or chills.  No sick contacts or recent long distance travel. Pt is a 62y/o  woman w/ PMHx HTN,  DM II, HLD with chronic back pain who presents to the ED  c/o  worsening  left  leg pain with difficulty ambulating  x4 days.  She c/o  leg pain associated w/ warmth, numbness, tingling.   She reports she did see Neurosurgery and was told she may need surgery.  She denies bowel/bladder incontinence but does report constipation and hemorrhoids.    She denies  recent traumas or falls.   Pt was unable to ambulate in the ED due to weakness.      She denies HA, dizziness, CP, SOB, N/V/D, fever or chills.  She denies sick contacts and no recent travel.       Fam Hx:   Mother  of MI at 62  Father  after falling off a horse    Surg hx:  denied  Brothers DM Pt is a 64y/o  woman w/ PMHx HTN,  DM II, HLD with chronic back pain who presents to the ED  c/o  worsening  left  leg pain with difficulty ambulating  x4 days.  She c/o  leg pain associated w/ warmth, numbness, tingling.   She reports she did see Neurosurgery and was told she may need surgery.  She denies bowel/bladder incontinence but does report constipation and hemorrhoids.    She denies  recent traumas or falls.   Pt was unable to ambulate in the ED due to weakness.      She denies HA, dizziness, CP, SOB, N/V/D, fever or chills.  She denies sick contacts and no recent travel.       Fam Hx:   Mother  of MI at 62  Father  after falling off a horse    Surg hx:  denied  Brothers DM

## 2019-01-25 NOTE — ED PROVIDER NOTE - MEDICAL DECISION MAKING DETAILS
62y/o F w/ PMHx HTN (amlodipine, metoprolol), DM (Metformin and insulin), HLD with chronic back pain presents to the ED w/ c/o acutely worsening leg pain in her left leg with difficulty walking x4 days.  Plan: CT abd/pelvis, pain medication, reevaluate.

## 2019-01-25 NOTE — ED PROVIDER NOTE - PROGRESS NOTE DETAILS
pt unable to walk with assist of 1 and walker, will get labs and admit pt for pain control B Jaylyn RUBIO spoke with Dr. Lizarraga hospitalist for nikhil De La O DO

## 2019-01-25 NOTE — H&P ADULT - ASSESSMENT
Pt is admitted with    I. Inability to ambulate  - physical therapy    II. DM     III. HTN    IV. DVT prophylaxis  - heparin    V. IMPROVE VTE Individual Risk Assessment    RISK                                                                Points    [  ] Previous VTE                                                  3    [  ] Thrombophilia                                               2    [  ] Lower limb paralysis                                      2        (unable to hold up >15 seconds)      [  ] Current Cancer                                              2         (within 6 months)    [  ] Immobilization > 24 hrs                                1    [  ] ICU/CCU stay > 24 hours                              1    [ X ] Age > 60                                                      1    IMPROVE VTE Score ____1_____ Pt is a 64y/o  woman w/ PMHx HTN,  DM II, HLD with chronic back pain who presents to the ED  c/o  worsening  left  leg pain with difficulty ambulating  x4 days.  She c/o  leg pain associated w/ warmth, numbness, tingling.   She reports she did see Neurosurgery and was told she may need surgery.  She denies bowel/bladder incontinence but does report constipation and hemorrhoids.    She denies  recent traumas or falls.   Pt was unable to ambulate in the ED due to weakness.      see CT: shows severe central stenosis noted at L3-4 and L4-5      Pt is admitted with    I. Inability to ambulate  - physical therapy  - pain control in the ED with dilauded 0.5mg was inadequate,  will increase dilauded dose  and add lidocaine patches  - cont antiemetics  - Neurosurg consult, pt seen by PA in the ED    II. DM   - Ins sliding scale    III. HTN  - cont meds    IV. DVT prophylaxis  - heparin    V. IMPROVE VTE Individual Risk Assessment    RISK                                                                Points    [  ] Previous VTE                                                  3    [  ] Thrombophilia                                               2    [  ] Lower limb paralysis                                      2        (unable to hold up >15 seconds)      [  ] Current Cancer                                              2         (within 6 months)    [  ] Immobilization > 24 hrs                                1    [  ] ICU/CCU stay > 24 hours                              1    [ X ] Age > 60                                                      1    IMPROVE VTE Score ____1_____    VI. Hep C testing  - pt requests marlyn Pt is a 62y/o  woman w/ PMHx HTN,  DM II, HLD with chronic back pain who presents to the ED  c/o  worsening  left  leg pain with difficulty ambulating  x4 days.  She c/o  leg pain associated w/ warmth, numbness, tingling.   She reports she did see Neurosurgery and was told she may need surgery.  She denies bowel/bladder incontinence but does report constipation and hemorrhoids.    She denies  recent traumas or falls.   Pt was unable to ambulate in the ED due to weakness.      see CT: shows severe central stenosis noted at L3-4 and L4-5    Pt is admitted with    I. Inability to ambulate  - physical therapy  - pain control in the ED with dilauded 0.5mg was inadequate,  will increase dilauded dose  and add lidocaine patches  - cont antiemetics  - Neurosurg consult, pt seen by PA in the ED    II. DM   - Ins sliding scale    III. HTN  - cont meds    IV. DVT prophylaxis  - heparin    V. IMPROVE VTE Individual Risk Assessment    RISK                                                                Points    [  ] Previous VTE                                                  3    [  ] Thrombophilia                                               2    [  ] Lower limb paralysis                                      2        (unable to hold up >15 seconds)      [  ] Current Cancer                                              2         (within 6 months)    [  ] Immobilization > 24 hrs                                1    [  ] ICU/CCU stay > 24 hours                              1    [ X ] Age > 60                                                      1    IMPROVE VTE Score ____1_____    VI. Hep C testing  - pt requests this

## 2019-01-25 NOTE — ED STATDOCS - PROGRESS NOTE DETAILS
Oneyda Quispe for attending Dr. Bender: 62 y/o female with a PMHx of DM (Metformin and insulin), HLD, HTN, presents to the ED c/o b/l LE pain x4 days. Pt notes warmth of her legs. +numbness, +tingling. Denies bowel/bladder incontinence. No recent trauma. No recent falls. Glucose in room 224. Performed ambulation trial in room. Pt took a step, became weak, and had to be assisted to chair. Will send pt to main ED for further evaluation.  ID#: [id number].

## 2019-01-25 NOTE — CONSULT NOTE ADULT - ASSESSMENT
lumbar spinal stenosis  lumbar radiculopathy with LLE weakness    Plan  recommend admit for pain control, decadron 8mg x 1 then 2 mg q8  Patient had recent MRI of lumbar spine, no need to repeat at this time  CT done tonight reviewed and discussed with Dr Whalen  Spoke with patient and family at bedside explaining patient may require surgical intervention. patient is considering surgical intervention  Will need pre op once patient agrees to surgical intervention

## 2019-01-25 NOTE — H&P ADULT - NSHPLABSRESULTS_GEN_ALL_CORE
< from: CT Lumbar Spine No Cont (01.25.19 @ 13:43) >      EXAM:  CT LUMBAR SPINE                            PROCEDURE DATE:  01/25/2019          INTERPRETATION:    CT lumbar without contrast    CLINICAL INFORMATION:       Lumbar spondylosis.    TECHNIQUE: CT of the lumbar spine was performed with axial   reconstructions. 2-D sagittal and coronal reformatted images were also   obtained.    FINDINGS:   No prior similar studies are available for review.         Lumbar vertebral alignment is significant for grade 1   anterospondylolisthesis at L4-5 on a degenerative basis.  Lumbar   vertebral body heights are maintained. No destructive lesions are noted.   There is calcification of the anterior longitudinal ligaments in the   lower thoracic upper lumbar spine.    Lumbar intervertebral discs show disc degeneration and spondylosis at   T12-L1 through L5-S1 with loss of disc height and associated degenerative   endplate changes. Disc bulges are noted at these levels which flatten the   ventral thecal sac and narrow the BILATERAL neural foramina. Mildcentral   stenosis noted at L2-3 and severe central stenosis noted at L3-4 and L4-5   on a degenerative basis due to facet osteophyte disc bulge, facet   osteophytic hypertrophy and redundancy of ligamentum flavum. Marked facet   osteophytic hypertrophy is noted at L5-S1.    The paraspinal soft tissues are intact.    IMPRESSION:  Grade 1 anterospondylolisthesis at L4-5 on a degenerative   basis.  Disc degeneration and spondylosis at T12-L1 through L5-S1 with   loss of disc height and associated degenerative endplate changes. Disc   bulges are noted at these levels which flatten the ventral thecal sac and   narrow the BILATERAL neural foramina. Mild central stenosis noted at L2-3   and severe central stenosis noted at L3-4 and L4-5 on a degenerative   basis due to facet osteophyte disc bulge, facet osteophytic hypertrophy   and redundancy of ligamentum flavum. Marked facet osteophytic hypertrophy   is noted at L5-S1.    JANELLE MATHIS M.D., ATTENDING RADIOLOGIST  This document has been electronically signed. Jan 25 2019  1:55PM    < end of copied text >

## 2019-01-26 LAB
GLUCOSE BLDC GLUCOMTR-MCNC: 222 MG/DL — HIGH (ref 70–99)
GLUCOSE BLDC GLUCOMTR-MCNC: 246 MG/DL — HIGH (ref 70–99)
GLUCOSE BLDC GLUCOMTR-MCNC: 263 MG/DL — HIGH (ref 70–99)
GLUCOSE BLDC GLUCOMTR-MCNC: 373 MG/DL — HIGH (ref 70–99)
HBA1C BLD-MCNC: 8.5 % — HIGH (ref 4–5.6)
HCV AB S/CO SERPL IA: 0.13 S/CO — SIGNIFICANT CHANGE UP
HCV AB SERPL-IMP: SIGNIFICANT CHANGE UP

## 2019-01-26 PROCEDURE — 99231 SBSQ HOSP IP/OBS SF/LOW 25: CPT

## 2019-01-26 RX ORDER — PANTOPRAZOLE SODIUM 20 MG/1
40 TABLET, DELAYED RELEASE ORAL
Qty: 0 | Refills: 0 | Status: DISCONTINUED | OUTPATIENT
Start: 2019-01-26 | End: 2019-01-27

## 2019-01-26 RX ORDER — OXYCODONE HYDROCHLORIDE 5 MG/1
5 TABLET ORAL EVERY 4 HOURS
Qty: 0 | Refills: 0 | Status: DISCONTINUED | OUTPATIENT
Start: 2019-01-26 | End: 2019-01-31

## 2019-01-26 RX ORDER — HYDROMORPHONE HYDROCHLORIDE 2 MG/ML
1 INJECTION INTRAMUSCULAR; INTRAVENOUS; SUBCUTANEOUS EVERY 4 HOURS
Qty: 0 | Refills: 0 | Status: DISCONTINUED | OUTPATIENT
Start: 2019-01-26 | End: 2019-01-27

## 2019-01-26 RX ORDER — DOCUSATE SODIUM 100 MG
100 CAPSULE ORAL THREE TIMES A DAY
Qty: 0 | Refills: 0 | Status: DISCONTINUED | OUTPATIENT
Start: 2019-01-26 | End: 2019-01-31

## 2019-01-26 RX ORDER — SENNA PLUS 8.6 MG/1
2 TABLET ORAL AT BEDTIME
Qty: 0 | Refills: 0 | Status: DISCONTINUED | OUTPATIENT
Start: 2019-01-26 | End: 2019-01-31

## 2019-01-26 RX ORDER — METHOCARBAMOL 500 MG/1
750 TABLET, FILM COATED ORAL EVERY 8 HOURS
Qty: 0 | Refills: 0 | Status: COMPLETED | OUTPATIENT
Start: 2019-01-26 | End: 2019-01-31

## 2019-01-26 RX ORDER — GABAPENTIN 400 MG/1
200 CAPSULE ORAL EVERY 8 HOURS
Qty: 0 | Refills: 0 | Status: DISCONTINUED | OUTPATIENT
Start: 2019-01-26 | End: 2019-01-31

## 2019-01-26 RX ORDER — ACETAMINOPHEN 500 MG
650 TABLET ORAL EVERY 6 HOURS
Qty: 0 | Refills: 0 | Status: DISCONTINUED | OUTPATIENT
Start: 2019-01-26 | End: 2019-01-31

## 2019-01-26 RX ORDER — INSULIN GLARGINE 100 [IU]/ML
18 INJECTION, SOLUTION SUBCUTANEOUS AT BEDTIME
Qty: 0 | Refills: 0 | Status: DISCONTINUED | OUTPATIENT
Start: 2019-01-26 | End: 2019-01-27

## 2019-01-26 RX ORDER — OXYCODONE HYDROCHLORIDE 5 MG/1
10 TABLET ORAL EVERY 4 HOURS
Qty: 0 | Refills: 0 | Status: DISCONTINUED | OUTPATIENT
Start: 2019-01-26 | End: 2019-01-31

## 2019-01-26 RX ADMIN — SENNA PLUS 2 TABLET(S): 8.6 TABLET ORAL at 20:45

## 2019-01-26 RX ADMIN — HEPARIN SODIUM 5000 UNIT(S): 5000 INJECTION INTRAVENOUS; SUBCUTANEOUS at 16:09

## 2019-01-26 RX ADMIN — OXYCODONE HYDROCHLORIDE 10 MILLIGRAM(S): 5 TABLET ORAL at 20:42

## 2019-01-26 RX ADMIN — Medication 50 MILLIGRAM(S): at 06:56

## 2019-01-26 RX ADMIN — Medication 2: at 09:10

## 2019-01-26 RX ADMIN — INSULIN GLARGINE 18 UNIT(S): 100 INJECTION, SOLUTION SUBCUTANEOUS at 23:00

## 2019-01-26 RX ADMIN — Medication 650 MILLIGRAM(S): at 01:51

## 2019-01-26 RX ADMIN — HYDROMORPHONE HYDROCHLORIDE 1 MILLIGRAM(S): 2 INJECTION INTRAMUSCULAR; INTRAVENOUS; SUBCUTANEOUS at 10:46

## 2019-01-26 RX ADMIN — Medication 650 MILLIGRAM(S): at 17:03

## 2019-01-26 RX ADMIN — GABAPENTIN 200 MILLIGRAM(S): 400 CAPSULE ORAL at 16:08

## 2019-01-26 RX ADMIN — METHOCARBAMOL 750 MILLIGRAM(S): 500 TABLET, FILM COATED ORAL at 20:44

## 2019-01-26 RX ADMIN — HEPARIN SODIUM 5000 UNIT(S): 5000 INJECTION INTRAVENOUS; SUBCUTANEOUS at 06:57

## 2019-01-26 RX ADMIN — Medication 650 MILLIGRAM(S): at 17:30

## 2019-01-26 RX ADMIN — AMLODIPINE BESYLATE 5 MILLIGRAM(S): 2.5 TABLET ORAL at 06:56

## 2019-01-26 RX ADMIN — Medication 30 MILLILITER(S): at 23:16

## 2019-01-26 RX ADMIN — Medication 2 MILLIGRAM(S): at 06:56

## 2019-01-26 RX ADMIN — Medication 100 MILLIGRAM(S): at 20:45

## 2019-01-26 RX ADMIN — HYDROMORPHONE HYDROCHLORIDE 1 MILLIGRAM(S): 2 INJECTION INTRAMUSCULAR; INTRAVENOUS; SUBCUTANEOUS at 17:38

## 2019-01-26 RX ADMIN — Medication 2: at 12:22

## 2019-01-26 RX ADMIN — ATORVASTATIN CALCIUM 20 MILLIGRAM(S): 80 TABLET, FILM COATED ORAL at 20:45

## 2019-01-26 RX ADMIN — HYDROMORPHONE HYDROCHLORIDE 1 MILLIGRAM(S): 2 INJECTION INTRAMUSCULAR; INTRAVENOUS; SUBCUTANEOUS at 02:44

## 2019-01-26 RX ADMIN — HEPARIN SODIUM 5000 UNIT(S): 5000 INJECTION INTRAVENOUS; SUBCUTANEOUS at 20:45

## 2019-01-26 RX ADMIN — GABAPENTIN 200 MILLIGRAM(S): 400 CAPSULE ORAL at 20:43

## 2019-01-26 RX ADMIN — LIDOCAINE 2 PATCH: 4 CREAM TOPICAL at 12:24

## 2019-01-26 RX ADMIN — Medication 2 MILLIGRAM(S): at 20:46

## 2019-01-26 RX ADMIN — Medication 2 MILLIGRAM(S): at 16:08

## 2019-01-26 RX ADMIN — Medication 650 MILLIGRAM(S): at 23:00

## 2019-01-26 RX ADMIN — Medication 3: at 17:02

## 2019-01-26 NOTE — PROGRESS NOTE ADULT - SUBJECTIVE AND OBJECTIVE BOX
CC: difficulty ambulating (25 Jan 2019 20:32)    HPI:  Pt is a 62y/o  woman w/ PMHx HTN,  DM II, HLD with chronic back pain who presents to the ED  c/o  worsening  left  leg pain with difficulty ambulating  x4 days.  She c/o  leg pain associated w/ warmth, numbness, tingling.   She reports she did see Neurosurgery and was told she may need surgery.  She denies bowel/bladder incontinence but does report constipation and hemorrhoids.    She denies  recent traumas or falls.   Pt was unable to ambulate in the ED due to weakness.      She denies HA, dizziness, CP, SOB, N/V/D, fever or chills.  She denies sick contacts and no recent travel.           INTERVAL HPI/OVERNIGHT EVENTS:    Vital Signs Last 24 Hrs  T(C): 36.9 (26 Jan 2019 11:24), Max: 37.1 (25 Jan 2019 16:57)  T(F): 98.4 (26 Jan 2019 11:24), Max: 98.7 (25 Jan 2019 16:57)  HR: 93 (26 Jan 2019 11:24) (69 - 93)  BP: 143/70 (26 Jan 2019 11:24) (135/69 - 152/70)  BP(mean): --  RR: 17 (26 Jan 2019 11:24) (16 - 18)  SpO2: 94% (26 Jan 2019 11:24) (92% - 97%)  I&O's Detail    REVIEW OF SYSTEMS:    CONSTITUTIONAL: No weakness, fevers or chills  EYES/ENT: No visual changes;  No vertigo or throat pain   NECK: No pain or stiffness  RESPIRATORY: No cough, wheezing, hemoptysis; No shortness of breath  CARDIOVASCULAR: No chest pain or palpitations  GASTROINTESTINAL: No abdominal or epigastric pain. No nausea, vomiting, or hematemesis; No diarrhea or constipation. No melena or hematochezia.  GENITOURINARY: No dysuria, frequency or hematuria  NEUROLOGICAL: No numbness or weakness  SKIN: No itching, burning, rashes, or lesions   All other review of systems is negative unless indicated above.  PHYSICAL EXAM:    General: Well developed; well nourished; in no acute distress  Eyes: PERRLA, EOMI; conjunctiva and sclera clear  Head: Normocephalic; atraumatic  ENMT: No nasal discharge; airway clear  Neck: Supple; non tender; no masses  Respiratory: No wheezes, rales or rhonchi  Cardiovascular: Regular rate and rhythm. S1 and S2 Normal; No murmurs, gallops or rubs  Gastrointestinal: Soft non-tender non-distended; Normal bowel sounds  Genitourinary: No costovertebral angle tenderness  Extremities: Normal range of motion, No clubbing, cyanosis or edema  Vascular: Peripheral pulses palpable 2+ bilaterally  Neurological: Alert and oriented x4  Skin: Warm and dry. No acute rash  Lymph Nodes: No acute cervical adenopathy  Musculoskeletal: Normal gait, tone, without deformities  Psychiatric: Cooperative and appropriate                            13.5   8.39  )-----------( 240      ( 25 Jan 2019 15:44 )             39.4     25 Jan 2019 15:44    139    |  104    |  12     ----------------------------<  134    3.8     |  29     |  0.49     Ca    8.9        25 Jan 2019 15:44        CAPILLARY BLOOD GLUCOSE      POCT Blood Glucose.: 246 mg/dL (26 Jan 2019 11:58)  POCT Blood Glucose.: 222 mg/dL (26 Jan 2019 08:18)  POCT Blood Glucose.: 211 mg/dL (25 Jan 2019 23:00)  POCT Blood Glucose.: 224 mg/dL (25 Jan 2019 13:03)        Hemoglobin A1C, Whole Blood: 8.5 % (01-25-19 @ 20:24)    MEDICATIONS  (STANDING):  amLODIPine   Tablet 5 milliGRAM(s) Oral daily  atorvastatin 20 milliGRAM(s) Oral at bedtime  dexamethasone  Injectable 2 milliGRAM(s) IV Push every 8 hours  dexamethasone  IVPB 8 milliGRAM(s) IV Intermittent once  dextrose 5%. 1000 milliLiter(s) (50 mL/Hr) IV Continuous <Continuous>  dextrose 50% Injectable 12.5 Gram(s) IV Push once  dextrose 50% Injectable 25 Gram(s) IV Push once  dextrose 50% Injectable 25 Gram(s) IV Push once  docusate sodium 100 milliGRAM(s) Oral at bedtime  heparin  Injectable 5000 Unit(s) SubCutaneous every 8 hours  insulin glargine Injectable (LANTUS) 10 Unit(s) SubCutaneous at bedtime  insulin lispro (HumaLOG) corrective regimen sliding scale   SubCutaneous three times a day before meals  lidocaine   Patch 2 Patch Transdermal daily  metoprolol succinate ER 50 milliGRAM(s) Oral daily    MEDICATIONS  (PRN):  acetaminophen   Tablet .. 650 milliGRAM(s) Oral every 6 hours PRN Mild Pain (1 - 3)  dextrose 40% Gel 15 Gram(s) Oral once PRN Blood Glucose LESS THAN 70 milliGRAM(s)/deciLiter  glucagon  Injectable 1 milliGRAM(s) IntraMuscular once PRN Glucose <70 milliGRAM(s)/deciLiter  HYDROmorphone  Injectable 1 milliGRAM(s) IV Push every 6 hours PRN Moderate Pain (4 - 6)  ondansetron Injectable 8 milliGRAM(s) IV Push every 6 hours PRN Nausea and/or Vomiting      RADIOLOGY & ADDITIONAL TESTS: CC: difficulty ambulating (25 Jan 2019 20:32)    HPI:  Pt is a 64y/o  woman w/ PMHx HTN,  DM II, HLD with chronic back pain who presents to the ED  c/o  worsening  left  leg pain with difficulty ambulating  x4 days.  She c/o  leg pain associated w/ warmth, numbness, tingling.   She reports she did see Neurosurgery and was told she may need surgery.  She denies bowel/bladder incontinence but does report constipation and hemorrhoids.    She denies  recent traumas or falls.   Pt was unable to ambulate in the ED due to weakness.      She denies HA, dizziness, CP, SOB, N/V/D, fever or chills.  She denies sick contacts and no recent travel.           INTERVAL HPI/ OVERNIGHT EVENTS: Chart reviewed, Pt was seen and examined.   Phone  used.  Pt reports still feeling pain L buttock and Leg associated with Lateral L thigh to feet tingling no difficulty urinating or bowel habits change. Was  up with PT was  not able to do much due to pain.  Pt was seen by neuroSx team and possible need in Sx discussed, Pt agreeable. Pt reports that was evaluated by cardio about year ago and had different test including exercise and work up was neg. Denies SOB or CP     Vital Signs Last 24 Hrs  T(C): 36.9 (26 Jan 2019 11:24), Max: 37.1 (25 Jan 2019 16:57)  T(F): 98.4 (26 Jan 2019 11:24), Max: 98.7 (25 Jan 2019 16:57)  HR: 93 (26 Jan 2019 11:24) (69 - 93)  BP: 143/70 (26 Jan 2019 11:24) (135/69 - 152/70)  RR: 17 (26 Jan 2019 11:24) (16 - 18)  SpO2: 94% (26 Jan 2019 11:24) (92% - 97%)      REVIEW OF SYSTEMS:  All other review of systems is negative unless indicated above.        PHYSICAL EXAM:  General: Well developed; well nourished; in some pain   Eyes: PERRLA, EOMI; conjunctiva and sclera clear  Head: Normocephalic; atraumatic  ENMT: No nasal discharge; airway clear  Neck: Supple; non tender; no masses  Respiratory: Good air entry. No wheezes, rales or rhonchi  Cardiovascular: Regular rate and rhythm. S1 and S2 Normal; No murmurs  Gastrointestinal: Soft non-tender non-distended; Normal bowel sounds  Genitourinary: No costovertebral angle tenderness  Extremities: decreased range of motion LLE due to pain,   + b/l calf tenderness, no significant edema  Vascular: Peripheral pulses palpable 2+ bilaterally  Neurological: Alert and oriented x4, non focal   Skin: Warm and dry. No acute rash  Lymph Nodes: No acute cervical adenopathy  Musculoskeletal: Normal muscle tone, without deformities  Psychiatric: Cooperative and appropriate        LAbs;                         13.5   8.39  )-----------( 240      ( 25 Jan 2019 15:44 )             39.4     25 Jan 2019 15:44    139    |  104    |  12     ----------------------------<  134    3.8     |  29     |  0.49     Ca    8.9        25 Jan 2019 15:44        CAPILLARY BLOOD GLUCOSE  POCT Blood Glucose.: 246 mg/dL (26 Jan 2019 11:58)  POCT Blood Glucose.: 222 mg/dL (26 Jan 2019 08:18)  POCT Blood Glucose.: 211 mg/dL (25 Jan 2019 23:00)  POCT Blood Glucose.: 224 mg/dL (25 Jan 2019 13:03)        Hemoglobin A1C, Whole Blood: 8.5 % (01-25-19 @ 20:24)    MEDICATIONS  (STANDING):  amLODIPine   Tablet 5 milliGRAM(s) Oral daily  atorvastatin 20 milliGRAM(s) Oral at bedtime  dexamethasone  Injectable 2 milliGRAM(s) IV Push every 8 hours  dexamethasone  IVPB 8 milliGRAM(s) IV Intermittent once  dextrose 5%. 1000 milliLiter(s) (50 mL/Hr) IV Continuous <Continuous>  dextrose 50% Injectable 12.5 Gram(s) IV Push once  dextrose 50% Injectable 25 Gram(s) IV Push once  dextrose 50% Injectable 25 Gram(s) IV Push once  docusate sodium 100 milliGRAM(s) Oral at bedtime  heparin  Injectable 5000 Unit(s) SubCutaneous every 8 hours  insulin glargine Injectable (LANTUS) 10 Unit(s) SubCutaneous at bedtime  insulin lispro (HumaLOG) corrective regimen sliding scale   SubCutaneous three times a day before meals  lidocaine   Patch 2 Patch Transdermal daily  metoprolol succinate ER 50 milliGRAM(s) Oral daily    MEDICATIONS  (PRN):  acetaminophen   Tablet .. 650 milliGRAM(s) Oral every 6 hours PRN Mild Pain (1 - 3)  dextrose 40% Gel 15 Gram(s) Oral once PRN Blood Glucose LESS THAN 70 milliGRAM(s)/deciLiter  glucagon  Injectable 1 milliGRAM(s) IntraMuscular once PRN Glucose <70 milliGRAM(s)/deciLiter  HYDROmorphone  Injectable 1 milliGRAM(s) IV Push every 6 hours PRN Moderate Pain (4 - 6)  ondansetron Injectable 8 milliGRAM(s) IV Push every 6 hours PRN Nausea and/or Vomiting      RADIOLOGY & ADDITIONAL TESTS:  < from: CT Lumbar Spine No Cont (01.25.19 @ 13:43) >  EXAM:  CT LUMBAR SPINE                            PROCEDURE DATE:  01/25/2019          INTERPRETATION:    CT lumbar without contrast    CLINICAL INFORMATION:       Lumbar spondylosis.    TECHNIQUE: CT of the lumbar spine was performed with axial   reconstructions. 2-D sagittal and coronal reformatted images were also   obtained.    FINDINGS:   No prior similar studies are available for review.         Lumbar vertebral alignment is significant for grade 1   anterospondylolisthesis at L4-5 on a degenerative basis.  Lumbar   vertebral body heights are maintained. No destructive lesions are noted.   There is calcification of the anterior longitudinal ligaments in the   lower thoracic upper lumbar spine.    Lumbar intervertebral discs show disc degeneration and spondylosis at   T12-L1 through L5-S1 with loss of disc height and associated degenerative   endplate changes. Disc bulges are noted at these levels which flatten the   ventral thecal sac and narrow the BILATERAL neural foramina. Mildcentral   stenosis noted at L2-3 and severe central stenosis noted at L3-4 and L4-5   on a degenerative basis due to facet osteophyte disc bulge, facet   osteophytic hypertrophy and redundancy of ligamentum flavum. Marked facet   osteophytic hypertrophy is noted at L5-S1.    The paraspinal soft tissues are intact.    IMPRESSION:  Grade 1 anterospondylolisthesis at L4-5 on a degenerative   basis.  Disc degeneration and spondylosis at T12-L1 through L5-S1 with   loss of disc height and associated degenerative endplate changes. Disc   bulges are noted at these levels which flatten the ventral thecal sac and   narrow the BILATERAL neural foramina. Mild central stenosis noted at L2-3   and severe central stenosis noted at L3-4 and L4-5 on a degenerative   basis due to facet osteophyte disc bulge, facet osteophytic hypertrophy   and redundancy of ligamentum flavum. Marked facet osteophytic hypertrophy   is noted at L5-S1.          EXAM:  XR CHEST PORTABLE URGENT 1V                        PROCEDURE DATE:  01/25/2019      Findings:  The heart is mildly prominent.  The lungs are grossly clear. The apices   and hemidiaphragms are unremarkable. Degenerative changes of the   visualized osseous structures.        Impression:  No acute disease  No significant interval change as compared to  1/29/2017

## 2019-01-26 NOTE — PROGRESS NOTE ADULT - ASSESSMENT
Pt is a 64y/o  woman w/ PMHx HTN,  DM II, HLD with chronic back pain who presents to the ED  c/o  worsening  left  leg pain with difficulty ambulating  x4 days.  She c/o  leg pain associated w/ warmth, numbness, tingling.   She reports she did see Neurosurgery and was told she may need surgery.  She denies bowel/bladder incontinence but does report constipation and hemorrhoids.    She denies  recent traumas or falls.   Pt was unable to ambulate in the ED due to weakness.      see CT: shows severe central stenosis noted at L3-4 and L4-5    Pt is admitted with    I. Inability to ambulate  - physical therapy  - pain control in the ED with dilauded 0.5mg was inadequate,  will increase dilauded dose  and add lidocaine patches  - cont antiemetics  - Neurosurg consult, pt seen by PA in the ED    II. DM   - Ins sliding scale    III. HTN  - cont meds    IV. DVT prophylaxis  - heparin Pt is a 62y/o  woman w/ PMHx HTN,  DM II, HLD with chronic back due to severe Spinal stenosis  admitted for:       I.  Acute on chronic lower back pain, sciatica, Inability to ambulate  - CT reviewed.   - Neuro Sx eval appreciated, epidural injections planned outPt but were not approved by insurance.  - Started on decadron 8mg IVP x 1 and then 2 mg Q 8H  - C/w pain meds and muscle relaxants  - Add Neurontin    - physical therapy  - NeuroSx f/u appreciated, plan for conservative management, if no improvement might need Sx         II. DM   -HB A1c 8.5   - Hold oral hypoglycemics   - C/w Lantus, increase dose   - C/w  sliding scale    III. HTN  - cont meds: on amlodipine and Metoprolol     IV. HLD  - c/w Lipitor     V. B/l calf tenderness  - check b/l Dopplers to r/o DVT     VI. DVT prophylaxis  - heparin

## 2019-01-26 NOTE — PROGRESS NOTE ADULT - ASSESSMENT
64 yo female previously seen by Dr Shoemaker in office (11/2018) with severe lower back. MRI at that time sig for severe spinal stenosis at L 4-5 secondary to a grade I spondylolistheses, moderate stenosis at L2-3, L3-4. Flexion/ extension xrays demonstrates stability.     - No acute neurosurgical intervention  - Pain mgmt  - Dr shoemaker to discuss surgical options with pt in the morning  - Would do postvoid bladder scan to r/o retention  - Bowel regimen  - SQH for DVT ppx for now    Discussed with Dr Whalen

## 2019-01-26 NOTE — PHYSICAL THERAPY INITIAL EVALUATION ADULT - ADDITIONAL COMMENTS
Still rose mary, [facility]. Patient reported someone gave her a RW to use, has been using it x 6 months due to LB and leg pains.

## 2019-01-26 NOTE — PROGRESS NOTE ADULT - SUBJECTIVE AND OBJECTIVE BOX
Patient is a 63y old  Female who presents with a chief complaint of difficulty ambulating (26 Jan 2019 12:51)      HPI:  62 yo female previously seen by Dr Shoemaker in office (11/2018) with severe lower back. MRI at that time sig for severe spinal stenosis at L 4-5 secondary to a grade I spondylolistheses, moderate stenosis at L2-3, L3-4. Flexion/ extension xrays demonstrates stability. Patient tried to get epidural injection at two facilities but her insurance denied the EDSIs per the patient. Today the pain was so severe that she could no longer walk or bear weight on her left leg due to the pain. She tried Meloxicam with no relief. Patient denies any narcotics or medications. The pain is worse with ambulation and mildly improved with lying in bed. The back pain radiates down bilateral groin anterior thighs to knees. She denies any numbness to extremities. She denies any bowel or bladder incontinence, denies loss in sensation in pubic or rectal area.    1/26 - Pt seen and examined earlier today, in NAD. With use of  phone discussed possible surgical intervention which pt was agreeable to. She appears comfortable but admits to Left buttock / LE pain, no new complaints, no overnight events         acetaminophen   Tablet .. 650 milliGRAM(s) Oral every 6 hours PRN  amLODIPine   Tablet 5 milliGRAM(s) Oral daily  atorvastatin 20 milliGRAM(s) Oral at bedtime  dexamethasone  Injectable 2 milliGRAM(s) IV Push every 8 hours  dexamethasone  IVPB 8 milliGRAM(s) IV Intermittent once  dextrose 40% Gel 15 Gram(s) Oral once PRN  dextrose 5%. 1000 milliLiter(s) IV Continuous <Continuous>  dextrose 50% Injectable 12.5 Gram(s) IV Push once  dextrose 50% Injectable 25 Gram(s) IV Push once  dextrose 50% Injectable 25 Gram(s) IV Push once  docusate sodium 100 milliGRAM(s) Oral at bedtime  gabapentin 200 milliGRAM(s) Oral every 8 hours  glucagon  Injectable 1 milliGRAM(s) IntraMuscular once PRN  heparin  Injectable 5000 Unit(s) SubCutaneous every 8 hours  HYDROmorphone  Injectable 1 milliGRAM(s) IV Push every 6 hours PRN  insulin glargine Injectable (LANTUS) 10 Unit(s) SubCutaneous at bedtime  insulin lispro (HumaLOG) corrective regimen sliding scale   SubCutaneous three times a day before meals  lidocaine   Patch 2 Patch Transdermal daily  metoprolol succinate ER 50 milliGRAM(s) Oral daily  ondansetron Injectable 8 milliGRAM(s) IV Push every 6 hours PRN  pantoprazole    Tablet 40 milliGRAM(s) Oral before breakfast        Vital Signs Last 24 Hrs  T(C): 36.9 (26 Jan 2019 11:24), Max: 37.1 (25 Jan 2019 16:57)  T(F): 98.4 (26 Jan 2019 11:24), Max: 98.7 (25 Jan 2019 16:57)  HR: 93 (26 Jan 2019 11:24) (69 - 93)  BP: 143/70 (26 Jan 2019 11:24) (135/69 - 152/70)  RR: 17 (26 Jan 2019 11:24) (16 - 18)  SpO2: 94% (26 Jan 2019 11:24) (92% - 97%)                            13.5   8.39  )-----------( 240      ( 25 Jan 2019 15:44 )             39.4     139  |  104  |  12  ----------------------------<  134<H>  3.8   |  29  |  0.49<L>    Ca    8.9      25 Jan 2019 15:44        Physical Exam:  Constitutional: Awake / alert  HEENT: PERRLA, EOMI  Neck: Supple  Respiratory: Breath sounds are clear bilaterally  Cardiovascular: S1 and S2, regular rhythm  Gastrointestinal: Obese, soft, NT/ND  Extremities:  no edema  Musculoskeletal: no joint swelling/tenderness, no abnormal movements  Skin: No rashes    Neurological Exam:  HF: A x O x 3, appropriately interactive, normal affect, speech fluent, no aphasia (per )    CN: PERRL, EOMI, facial sensation normal, no NLFD, tongue midline  Motor: No pronator drift, Strength 5/5 in all 4 ext except Left DF/EHL 4/5 with some element of giveaway weakness, normal bulk and tone, no tremor, rigidity or bradykinesia  Sens: Dec sens Left later thigh, otherwise intact to light touch  Gait/Balance: Cannot test

## 2019-01-26 NOTE — PHYSICAL THERAPY INITIAL EVALUATION ADULT - PERTINENT HX OF CURRENT PROBLEM, REHAB EVAL
62 yo F admitted with c/o LB and L leg pain, progressively worsening; difficulty ambulating. She c/o  leg pain associated w/ warmth, numbness, tingling.  CT: shows severe central stenosis noted at L3-4 and L4-5. Out patient MRI 11/2018 per report reveals: severe spinal stenosis at L 4-5 secondary to a grade I spondylothesis, moderate stenosis at L3-4, spinal stenosis at L2-3. Flexion/ extension xrays demonstrates stability of the spondylolisthesis.

## 2019-01-27 LAB
GLUCOSE BLDC GLUCOMTR-MCNC: 250 MG/DL — HIGH (ref 70–99)
GLUCOSE BLDC GLUCOMTR-MCNC: 274 MG/DL — HIGH (ref 70–99)
GLUCOSE BLDC GLUCOMTR-MCNC: 282 MG/DL — HIGH (ref 70–99)
GLUCOSE BLDC GLUCOMTR-MCNC: 331 MG/DL — HIGH (ref 70–99)
GLUCOSE BLDC GLUCOMTR-MCNC: 337 MG/DL — HIGH (ref 70–99)

## 2019-01-27 PROCEDURE — 99231 SBSQ HOSP IP/OBS SF/LOW 25: CPT

## 2019-01-27 RX ORDER — DEXTROSE 50 % IN WATER 50 %
25 SYRINGE (ML) INTRAVENOUS ONCE
Qty: 0 | Refills: 0 | Status: DISCONTINUED | OUTPATIENT
Start: 2019-01-27 | End: 2019-01-31

## 2019-01-27 RX ORDER — PANTOPRAZOLE SODIUM 20 MG/1
40 TABLET, DELAYED RELEASE ORAL
Qty: 0 | Refills: 0 | Status: DISCONTINUED | OUTPATIENT
Start: 2019-01-27 | End: 2019-01-31

## 2019-01-27 RX ORDER — INSULIN LISPRO 100/ML
VIAL (ML) SUBCUTANEOUS AT BEDTIME
Qty: 0 | Refills: 0 | Status: DISCONTINUED | OUTPATIENT
Start: 2019-01-27 | End: 2019-01-31

## 2019-01-27 RX ORDER — DEXTROSE 50 % IN WATER 50 %
12.5 SYRINGE (ML) INTRAVENOUS ONCE
Qty: 0 | Refills: 0 | Status: DISCONTINUED | OUTPATIENT
Start: 2019-01-27 | End: 2019-01-31

## 2019-01-27 RX ORDER — SODIUM CHLORIDE 9 MG/ML
1000 INJECTION, SOLUTION INTRAVENOUS
Qty: 0 | Refills: 0 | Status: DISCONTINUED | OUTPATIENT
Start: 2019-01-27 | End: 2019-01-31

## 2019-01-27 RX ORDER — INSULIN GLARGINE 100 [IU]/ML
22 INJECTION, SOLUTION SUBCUTANEOUS AT BEDTIME
Qty: 0 | Refills: 0 | Status: DISCONTINUED | OUTPATIENT
Start: 2019-01-27 | End: 2019-01-31

## 2019-01-27 RX ORDER — HYDROMORPHONE HYDROCHLORIDE 2 MG/ML
2 INJECTION INTRAMUSCULAR; INTRAVENOUS; SUBCUTANEOUS EVERY 4 HOURS
Qty: 0 | Refills: 0 | Status: DISCONTINUED | OUTPATIENT
Start: 2019-01-27 | End: 2019-01-31

## 2019-01-27 RX ORDER — INSULIN LISPRO 100/ML
6 VIAL (ML) SUBCUTANEOUS
Qty: 0 | Refills: 0 | Status: DISCONTINUED | OUTPATIENT
Start: 2019-01-27 | End: 2019-01-31

## 2019-01-27 RX ORDER — DEXTROSE 50 % IN WATER 50 %
15 SYRINGE (ML) INTRAVENOUS ONCE
Qty: 0 | Refills: 0 | Status: DISCONTINUED | OUTPATIENT
Start: 2019-01-27 | End: 2019-01-31

## 2019-01-27 RX ORDER — GLUCAGON INJECTION, SOLUTION 0.5 MG/.1ML
1 INJECTION, SOLUTION SUBCUTANEOUS ONCE
Qty: 0 | Refills: 0 | Status: DISCONTINUED | OUTPATIENT
Start: 2019-01-27 | End: 2019-01-31

## 2019-01-27 RX ORDER — POLYETHYLENE GLYCOL 3350 17 G/17G
17 POWDER, FOR SOLUTION ORAL DAILY
Qty: 0 | Refills: 0 | Status: DISCONTINUED | OUTPATIENT
Start: 2019-01-27 | End: 2019-01-31

## 2019-01-27 RX ORDER — INSULIN LISPRO 100/ML
VIAL (ML) SUBCUTANEOUS
Qty: 0 | Refills: 0 | Status: DISCONTINUED | OUTPATIENT
Start: 2019-01-27 | End: 2019-01-31

## 2019-01-27 RX ADMIN — Medication 650 MILLIGRAM(S): at 13:00

## 2019-01-27 RX ADMIN — METHOCARBAMOL 750 MILLIGRAM(S): 500 TABLET, FILM COATED ORAL at 21:28

## 2019-01-27 RX ADMIN — ONDANSETRON 8 MILLIGRAM(S): 8 TABLET, FILM COATED ORAL at 07:55

## 2019-01-27 RX ADMIN — GABAPENTIN 200 MILLIGRAM(S): 400 CAPSULE ORAL at 15:07

## 2019-01-27 RX ADMIN — OXYCODONE HYDROCHLORIDE 10 MILLIGRAM(S): 5 TABLET ORAL at 16:32

## 2019-01-27 RX ADMIN — METHOCARBAMOL 750 MILLIGRAM(S): 500 TABLET, FILM COATED ORAL at 06:04

## 2019-01-27 RX ADMIN — Medication 100 MILLIGRAM(S): at 06:05

## 2019-01-27 RX ADMIN — HYDROMORPHONE HYDROCHLORIDE 1 MILLIGRAM(S): 2 INJECTION INTRAMUSCULAR; INTRAVENOUS; SUBCUTANEOUS at 15:45

## 2019-01-27 RX ADMIN — Medication 2 MILLIGRAM(S): at 15:08

## 2019-01-27 RX ADMIN — Medication 100 MILLIGRAM(S): at 21:29

## 2019-01-27 RX ADMIN — Medication 650 MILLIGRAM(S): at 06:04

## 2019-01-27 RX ADMIN — LIDOCAINE 2 PATCH: 4 CREAM TOPICAL at 12:13

## 2019-01-27 RX ADMIN — HYDROMORPHONE HYDROCHLORIDE 2 MILLIGRAM(S): 2 INJECTION INTRAMUSCULAR; INTRAVENOUS; SUBCUTANEOUS at 21:33

## 2019-01-27 RX ADMIN — OXYCODONE HYDROCHLORIDE 10 MILLIGRAM(S): 5 TABLET ORAL at 17:30

## 2019-01-27 RX ADMIN — Medication 100 MILLIGRAM(S): at 15:07

## 2019-01-27 RX ADMIN — HYDROMORPHONE HYDROCHLORIDE 1 MILLIGRAM(S): 2 INJECTION INTRAMUSCULAR; INTRAVENOUS; SUBCUTANEOUS at 15:15

## 2019-01-27 RX ADMIN — GABAPENTIN 200 MILLIGRAM(S): 400 CAPSULE ORAL at 21:28

## 2019-01-27 RX ADMIN — ATORVASTATIN CALCIUM 20 MILLIGRAM(S): 80 TABLET, FILM COATED ORAL at 21:29

## 2019-01-27 RX ADMIN — Medication 8: at 12:10

## 2019-01-27 RX ADMIN — Medication 2 MILLIGRAM(S): at 06:58

## 2019-01-27 RX ADMIN — PANTOPRAZOLE SODIUM 40 MILLIGRAM(S): 20 TABLET, DELAYED RELEASE ORAL at 17:47

## 2019-01-27 RX ADMIN — PANTOPRAZOLE SODIUM 40 MILLIGRAM(S): 20 TABLET, DELAYED RELEASE ORAL at 06:04

## 2019-01-27 RX ADMIN — Medication 2 MILLIGRAM(S): at 21:27

## 2019-01-27 RX ADMIN — HEPARIN SODIUM 5000 UNIT(S): 5000 INJECTION INTRAVENOUS; SUBCUTANEOUS at 21:28

## 2019-01-27 RX ADMIN — HEPARIN SODIUM 5000 UNIT(S): 5000 INJECTION INTRAVENOUS; SUBCUTANEOUS at 15:07

## 2019-01-27 RX ADMIN — HEPARIN SODIUM 5000 UNIT(S): 5000 INJECTION INTRAVENOUS; SUBCUTANEOUS at 06:05

## 2019-01-27 RX ADMIN — Medication 50 MILLIGRAM(S): at 06:04

## 2019-01-27 RX ADMIN — Medication 650 MILLIGRAM(S): at 12:09

## 2019-01-27 RX ADMIN — SENNA PLUS 2 TABLET(S): 8.6 TABLET ORAL at 21:28

## 2019-01-27 RX ADMIN — HYDROMORPHONE HYDROCHLORIDE 1 MILLIGRAM(S): 2 INJECTION INTRAMUSCULAR; INTRAVENOUS; SUBCUTANEOUS at 00:39

## 2019-01-27 RX ADMIN — METHOCARBAMOL 750 MILLIGRAM(S): 500 TABLET, FILM COATED ORAL at 15:08

## 2019-01-27 RX ADMIN — GABAPENTIN 200 MILLIGRAM(S): 400 CAPSULE ORAL at 06:04

## 2019-01-27 RX ADMIN — OXYCODONE HYDROCHLORIDE 10 MILLIGRAM(S): 5 TABLET ORAL at 06:05

## 2019-01-27 RX ADMIN — Medication 650 MILLIGRAM(S): at 17:47

## 2019-01-27 RX ADMIN — Medication 8: at 17:48

## 2019-01-27 RX ADMIN — Medication 6 UNIT(S): at 12:10

## 2019-01-27 RX ADMIN — Medication 3: at 07:51

## 2019-01-27 RX ADMIN — AMLODIPINE BESYLATE 5 MILLIGRAM(S): 2.5 TABLET ORAL at 06:04

## 2019-01-27 RX ADMIN — OXYCODONE HYDROCHLORIDE 10 MILLIGRAM(S): 5 TABLET ORAL at 12:08

## 2019-01-27 RX ADMIN — Medication 6 UNIT(S): at 17:49

## 2019-01-27 RX ADMIN — Medication 2: at 21:30

## 2019-01-27 RX ADMIN — INSULIN GLARGINE 22 UNIT(S): 100 INJECTION, SOLUTION SUBCUTANEOUS at 21:29

## 2019-01-27 RX ADMIN — ONDANSETRON 8 MILLIGRAM(S): 8 TABLET, FILM COATED ORAL at 23:46

## 2019-01-27 NOTE — PROGRESS NOTE ADULT - ASSESSMENT
Pt is a 64y/o  woman w/ PMHx HTN,  DM II, HLD with chronic back due to severe Spinal stenosis  admitted for:       I.  Acute on chronic lower back pain, sciatica, Inability to ambulate  - CT reviewed.   - Neuro Sx eval appreciated, epidural injections planned outPt but were not approved by insurance.  - Started on decadron 8mg IVP x 1 and then 2 mg Q 8H  - C/w pain meds and muscle relaxants  - Add Neurontin    - physical therapy  - NeuroSx f/u appreciated, plan for conservative management, if no improvement might need Sx         II. DM   -HB A1c 8.5   - Hold oral hypoglycemics   - C/w Lantus, increase dose   - C/w  sliding scale    III. HTN  - cont meds: on amlodipine and Metoprolol     IV. HLD  - c/w Lipitor     V. B/l calf tenderness  - check b/l Dopplers to r/o DVT     VI. DVT prophylaxis  - heparin Pt is a 62y/o  woman w/ PMHx HTN,  DM II, HLD with chronic back due to severe Spinal stenosis  admitted for:       I.  Acute on chronic lower back pain, sciatica, Inability to ambulate  - CT reviewed.   - Neuro Sx eval appreciated, epidural injections planned outPt but were not approved by insurance.  - Started on decadron 8mg IVP x 1 and then 2 mg Q 8H  - C/w pain meds and muscle relaxants  - c/w  Neurontin    - physical therapy  - NeuroSx f/u appreciated, plan for conservative management, if no improvement might need Sx         II. DM   -HB A1c 8.5   - Hold oral hypoglycemics   - C/w Lantus, increase dose   - C/w  sliding scale   QAC and QHS     III. HTN  - cont meds: on amlodipine and Metoprolol     IV. HLD  - c/w Lipitor     V. B/l calf tenderness  - check b/l Dopplers to r/o DVT     VI. GERD  Protonix increase to BID     VII. DVT prophylaxis  - heparin

## 2019-01-27 NOTE — PROGRESS NOTE ADULT - SUBJECTIVE AND OBJECTIVE BOX
HPI:  62 yo female previously seen by Dr Shoemaker in office (11/2018) with severe lower back. MRI at that time sig for severe spinal stenosis at L 4-5 secondary to a grade I spondylolistheses, moderate stenosis at L2-3, L3-4. Flexion/ extension xrays demonstrates stability. Patient tried to get epidural injection at two facilities but her insurance denied the EDSIs per the patient. Today the pain was so severe that she could no longer walk or bear weight on her left leg due to the pain. She tried Meloxicam with no relief. Patient denies any narcotics or medications. The pain is worse with ambulation and mildly improved with lying in bed. The back pain radiates down bilateral groin anterior thighs to knees. She denies any numbness to extremities. She denies any bowel or bladder incontinence, denies loss in sensation in pubic or rectal area.    1/26 - Pt seen and examined earlier today, in NAD. With use of  phone discussed possible surgical intervention which pt was agreeable to. She appears comfortable but admits to Left buttock / LE pain, no new complaints, no overnight events    1/27 Pt seen and examined, no acute events ON. Family at bedtime, per patient she states oral percocet lasts about 2-3 hours for her pain. She reports pain in her left buttock radiating to her L knee.     Vital Signs Last 24 Hrs  T(C): 36.5 (27 Jan 2019 11:52), Max: 36.9 (27 Jan 2019 05:09)  T(F): 97.7 (27 Jan 2019 11:52), Max: 98.4 (27 Jan 2019 05:09)  HR: 77 (27 Jan 2019 11:52) (67 - 77)  BP: 155/71 (27 Jan 2019 11:52) (133/49 - 155/71)  BP(mean): --  RR: 18 (27 Jan 2019 11:52) (16 - 18)  SpO2: 94% (27 Jan 2019 11:52) (94% - 95%)    MEDICATIONS  (STANDING):  acetaminophen   Tablet .. 650 milliGRAM(s) Oral every 6 hours  amLODIPine   Tablet 5 milliGRAM(s) Oral daily  atorvastatin 20 milliGRAM(s) Oral at bedtime  dexamethasone  Injectable 2 milliGRAM(s) IV Push every 8 hours  dexamethasone  IVPB 8 milliGRAM(s) IV Intermittent once  dextrose 5%. 1000 milliLiter(s) (50 mL/Hr) IV Continuous <Continuous>  dextrose 50% Injectable 12.5 Gram(s) IV Push once  dextrose 50% Injectable 25 Gram(s) IV Push once  dextrose 50% Injectable 25 Gram(s) IV Push once  docusate sodium 100 milliGRAM(s) Oral three times a day  gabapentin 200 milliGRAM(s) Oral every 8 hours  heparin  Injectable 5000 Unit(s) SubCutaneous every 8 hours  insulin glargine Injectable (LANTUS) 22 Unit(s) SubCutaneous at bedtime  insulin lispro (HumaLOG) corrective regimen sliding scale   SubCutaneous three times a day before meals  insulin lispro (HumaLOG) corrective regimen sliding scale   SubCutaneous at bedtime  insulin lispro Injectable (HumaLOG) 6 Unit(s) SubCutaneous three times a day before meals  lidocaine   Patch 2 Patch Transdermal daily  methocarbamol 750 milliGRAM(s) Oral every 8 hours  metoprolol succinate ER 50 milliGRAM(s) Oral daily  pantoprazole    Tablet 40 milliGRAM(s) Oral two times a day  senna 2 Tablet(s) Oral at bedtime    MEDICATIONS  (PRN):  dextrose 40% Gel 15 Gram(s) Oral once PRN Blood Glucose LESS THAN 70 milliGRAM(s)/deciliter  glucagon  Injectable 1 milliGRAM(s) IntraMuscular once PRN Glucose LESS THAN 70 milligrams/deciliter  HYDROmorphone  Injectable 1 milliGRAM(s) SubCutaneous every 4 hours PRN Severe Pain (7 - 10)  ondansetron Injectable 8 milliGRAM(s) IV Push every 6 hours PRN Nausea and/or Vomiting  oxyCODONE    IR 5 milliGRAM(s) Oral every 4 hours PRN Mild Pain (1 - 3)  oxyCODONE    IR 10 milliGRAM(s) Oral every 4 hours PRN Moderate Pain (4 - 6)  polyethylene glycol 3350 17 Gram(s) Oral daily PRN Constipation      PHYSICAL EXAM:  Constitutional: Awake, alert sitting up in chair  HEENT: PERRLA, EOMI  Neck: Supple  Respiratory: Breath sounds are clear bilaterally  Cardiovascular: S1 and S2, regular rhythm  Gastrointestinal: Obese, soft, NT/ND  Extremities:  no edema  Musculoskeletal: no joint swelling/tenderness, no abnormal movements  Skin: No rashes    Neurological Exam:  HF: A x O x 3, appropriately interactive, normal affect, speech fluent  CN: PERRL, EOMI, facial sensation normal, no NLFD, tongue midline  Motor: No pronator drift, Strength 5/5 in all 4 ext except Left DF/EHL 4/5, L IP 4/5, L QD 4/5 limited 2/2 pain, normal bulk and tone, no tremor, rigidity or bradykinesia, neg clonus  Sens: Dec sens Left later thigh, otherwise intact to light touch  Gait/Balance: Cannot test        LABS:     01-25 [id number] 8.5

## 2019-01-27 NOTE — PROGRESS NOTE ADULT - ASSESSMENT
64 yo female previously seen by Dr Shoemaker in office (11/2018) with severe lower back. MRI at that time sig for severe spinal stenosis at L 4-5 secondary to a grade I spondylolistheses, moderate stenosis at L2-3, L3-4. Flexion/ extension xrays demonstrates stability.     - CT L spine films reviewed with Dr. Whalen- Dr. Shoemaker to discuss surgical options with patient  - Continue pain control, PO oxycodone, IV Dilaudid prn, muscle relaxant  - Continue Dex 2mg Q8hr  - Start on neurontin by medicine  - PT/ OOB as tolerated  - Bowel regimen for GI proph  - SQH for DVT proph    Discussed with Dr Whalen

## 2019-01-27 NOTE — PROGRESS NOTE ADULT - SUBJECTIVE AND OBJECTIVE BOX
CC: difficulty ambulating (25 Jan 2019 20:32)    HPI:  Pt is a 64y/o  woman w/ PMHx HTN,  DM II, HLD with chronic back pain who presents to the ED  c/o  worsening  left  leg pain with difficulty ambulating  x4 days.  She c/o  leg pain associated w/ warmth, numbness, tingling.   She reports she did see Neurosurgery and was told she may need surgery.  She denies bowel/bladder incontinence but does report constipation and hemorrhoids.    She denies  recent traumas or falls.   Pt was unable to ambulate in the ED due to weakness.      She denies HA, dizziness, CP, SOB, N/V/D, fever or chills.  She denies sick contacts and no recent travel.           INTERVAL HPI/ OVERNIGHT EVENTS: Chart reviewed, Pt was seen and examined.   Phone  used.  Pt reports still feeling pain L buttock and Leg associated with Lateral L thigh to feet tingling no difficulty urinating or bowel habits change. Was  up with PT was  not able to do much due to pain.  Pt was seen by neuroSx team and possible need in Sx discussed, Pt agreeable. Pt reports that was evaluated by cardio about year ago and had different test including exercise and work up was neg. Denies SOB or CP     Vital Signs Last 24 Hrs  T(C): 36.9 (27 Jan 2019 05:09), Max: 36.9 (26 Jan 2019 11:24)  T(F): 98.4 (27 Jan 2019 05:09), Max: 98.4 (26 Jan 2019 11:24)  HR: 67 (27 Jan 2019 05:09) (67 - 93)  BP: 133/49 (27 Jan 2019 05:09) (133/49 - 143/70)  RR: 18 (27 Jan 2019 05:09) (16 - 18)  SpO2: 95% (27 Jan 2019 05:09) (94% - 97%)      REVIEW OF SYSTEMS:  All other review of systems is negative unless indicated above.        PHYSICAL EXAM:  General: Well developed; well nourished; in some pain   Eyes: PERRLA, EOMI; conjunctiva and sclera clear  Head: Normocephalic; atraumatic  ENMT: No nasal discharge; airway clear  Neck: Supple; non tender; no masses  Respiratory: Good air entry. No wheezes, rales or rhonchi  Cardiovascular: Regular rate and rhythm. S1 and S2 Normal; No murmurs  Gastrointestinal: Soft non-tender non-distended; Normal bowel sounds  Genitourinary: No costovertebral angle tenderness  Extremities: decreased range of motion LLE due to pain,   + b/l calf tenderness, no significant edema  Vascular: Peripheral pulses palpable 2+ bilaterally  Neurological: Alert and oriented x4, non focal   Skin: Warm and dry. No acute rash  Lymph Nodes: No acute cervical adenopathy  Musculoskeletal: Normal muscle tone, without deformities  Psychiatric: Cooperative and appropriate        LAbs;                         13.5   8.39  )-----------( 240      ( 25 Jan 2019 15:44 )             39.4     25 Jan 2019 15:44    139    |  104    |  12     ----------------------------<  134    3.8     |  29     |  0.49     Ca    8.9        25 Jan 2019 15:44        CAPILLARY BLOOD GLUCOSE  POCT Blood Glucose.: 246 mg/dL (26 Jan 2019 11:58)  POCT Blood Glucose.: 222 mg/dL (26 Jan 2019 08:18)  POCT Blood Glucose.: 211 mg/dL (25 Jan 2019 23:00)  POCT Blood Glucose.: 224 mg/dL (25 Jan 2019 13:03)        Hemoglobin A1C, Whole Blood: 8.5 % (01-25-19 @ 20:24)    MEDICATIONS  (STANDING):  amLODIPine   Tablet 5 milliGRAM(s) Oral daily  atorvastatin 20 milliGRAM(s) Oral at bedtime  dexamethasone  Injectable 2 milliGRAM(s) IV Push every 8 hours  dexamethasone  IVPB 8 milliGRAM(s) IV Intermittent once  dextrose 5%. 1000 milliLiter(s) (50 mL/Hr) IV Continuous <Continuous>  dextrose 50% Injectable 12.5 Gram(s) IV Push once  dextrose 50% Injectable 25 Gram(s) IV Push once  dextrose 50% Injectable 25 Gram(s) IV Push once  docusate sodium 100 milliGRAM(s) Oral at bedtime  heparin  Injectable 5000 Unit(s) SubCutaneous every 8 hours  insulin glargine Injectable (LANTUS) 10 Unit(s) SubCutaneous at bedtime  insulin lispro (HumaLOG) corrective regimen sliding scale   SubCutaneous three times a day before meals  lidocaine   Patch 2 Patch Transdermal daily  metoprolol succinate ER 50 milliGRAM(s) Oral daily    MEDICATIONS  (PRN):  acetaminophen   Tablet .. 650 milliGRAM(s) Oral every 6 hours PRN Mild Pain (1 - 3)  dextrose 40% Gel 15 Gram(s) Oral once PRN Blood Glucose LESS THAN 70 milliGRAM(s)/deciLiter  glucagon  Injectable 1 milliGRAM(s) IntraMuscular once PRN Glucose <70 milliGRAM(s)/deciLiter  HYDROmorphone  Injectable 1 milliGRAM(s) IV Push every 6 hours PRN Moderate Pain (4 - 6)  ondansetron Injectable 8 milliGRAM(s) IV Push every 6 hours PRN Nausea and/or Vomiting      RADIOLOGY & ADDITIONAL TESTS:  < from: CT Lumbar Spine No Cont (01.25.19 @ 13:43) >  EXAM:  CT LUMBAR SPINE                            PROCEDURE DATE:  01/25/2019          INTERPRETATION:    CT lumbar without contrast    CLINICAL INFORMATION:       Lumbar spondylosis.    TECHNIQUE: CT of the lumbar spine was performed with axial   reconstructions. 2-D sagittal and coronal reformatted images were also   obtained.    FINDINGS:   No prior similar studies are available for review.         Lumbar vertebral alignment is significant for grade 1   anterospondylolisthesis at L4-5 on a degenerative basis.  Lumbar   vertebral body heights are maintained. No destructive lesions are noted.   There is calcification of the anterior longitudinal ligaments in the   lower thoracic upper lumbar spine.    Lumbar intervertebral discs show disc degeneration and spondylosis at   T12-L1 through L5-S1 with loss of disc height and associated degenerative   endplate changes. Disc bulges are noted at these levels which flatten the   ventral thecal sac and narrow the BILATERAL neural foramina. Mildcentral   stenosis noted at L2-3 and severe central stenosis noted at L3-4 and L4-5   on a degenerative basis due to facet osteophyte disc bulge, facet   osteophytic hypertrophy and redundancy of ligamentum flavum. Marked facet   osteophytic hypertrophy is noted at L5-S1.    The paraspinal soft tissues are intact.    IMPRESSION:  Grade 1 anterospondylolisthesis at L4-5 on a degenerative   basis.  Disc degeneration and spondylosis at T12-L1 through L5-S1 with   loss of disc height and associated degenerative endplate changes. Disc   bulges are noted at these levels which flatten the ventral thecal sac and   narrow the BILATERAL neural foramina. Mild central stenosis noted at L2-3   and severe central stenosis noted at L3-4 and L4-5 on a degenerative   basis due to facet osteophyte disc bulge, facet osteophytic hypertrophy   and redundancy of ligamentum flavum. Marked facet osteophytic hypertrophy   is noted at L5-S1.          EXAM:  XR CHEST PORTABLE URGENT 1V                        PROCEDURE DATE:  01/25/2019      Findings:  The heart is mildly prominent.  The lungs are grossly clear. The apices   and hemidiaphragms are unremarkable. Degenerative changes of the   visualized osseous structures.        Impression:  No acute disease  No significant interval change as compared to  1/29/2017 CC: difficulty ambulating (25 Jan 2019 20:32)    HPI:  Pt is a 62y/o  woman w/ PMHx HTN,  DM II, HLD with chronic back pain who presents to the ED  c/o  worsening  left  leg pain with difficulty ambulating  x4 days.  She c/o  leg pain associated w/ warmth, numbness, tingling.   She reports she did see Neurosurgery and was told she may need surgery.  She denies bowel/bladder incontinence but does report constipation and hemorrhoids.    She denies  recent traumas or falls.   Pt was unable to ambulate in the ED due to weakness.      She denies HA, dizziness, CP, SOB, N/V/D, fever or chills.  She denies sick contacts and no recent travel.           INTERVAL HPI/ OVERNIGHT EVENTS:  Pt was seen and examined,  used. Reports pain is slightly better. C/o acid reflux symptoms and did not have BM at least few days.       Vital Signs Last 24 Hrs  T(C): 36.5 (27 Jan 2019 11:52), Max: 36.9 (27 Jan 2019 05:09)  T(F): 97.7 (27 Jan 2019 11:52), Max: 98.4 (27 Jan 2019 05:09)  HR: 77 (27 Jan 2019 11:52) (67 - 90)  BP: 155/71 (27 Jan 2019 11:52) (133/49 - 155/71)  RR: 18 (27 Jan 2019 11:52) (16 - 18)  SpO2: 94% (27 Jan 2019 11:52) (94% - 97%)    REVIEW OF SYSTEMS:  All other review of systems is negative unless indicated above.        PHYSICAL EXAM:  General: Well developed; well nourished; NAD   Eyes: PERRLA, EOMI; conjunctiva and sclera clear  Head: Normocephalic; atraumatic  ENMT: No nasal discharge; airway clear  Neck: Supple; non tender; no masses  Respiratory: Good air entry. No wheezes, rales or rhonchi  Cardiovascular: Regular rate and rhythm. S1 and S2 Normal; No murmurs  Gastrointestinal: Soft non-tender non-distended; Normal bowel sounds  Genitourinary: No costovertebral angle tenderness  Extremities: decreased range of motion LLE due to pain,   no edema  Vascular: Peripheral pulses palpable 2+ bilaterally  Neurological: Alert and oriented x4, non focal   Skin: Warm and dry. No acute rash  Lymph Nodes: No acute cervical adenopathy  Musculoskeletal: Normal muscle tone, without deformities  Psychiatric: Cooperative and appropriate        LAbs;                     13.5   8.39  )-----------( 240      ( 25 Jan 2019 15:44 )             39.4     25 Jan 2019 15:44    139    |  104    |  12     ----------------------------<  134    3.8     |  29     |  0.49     Ca    8.9        25 Jan 2019 15:44           CAPILLARY BLOOD GLUCOSE:     POCT Blood Glucose.: 337 mg/dL (27 Jan 2019 12:03)  POCT Blood Glucose.: 274 mg/dL (27 Jan 2019 07:46)  POCT Blood Glucose.: 373 mg/dL (26 Jan 2019 22:35)  POCT Blood Glucose.: 263 mg/dL (26 Jan 2019 17:01)      Hemoglobin A1C, Whole Blood: 8.5 % (01-25-19 @ 20:24)    MEDICATIONS  (STANDING):  acetaminophen   Tablet .. 650 milliGRAM(s) Oral every 6 hours  amLODIPine   Tablet 5 milliGRAM(s) Oral daily  atorvastatin 20 milliGRAM(s) Oral at bedtime  dexamethasone  Injectable 2 milliGRAM(s) IV Push every 8 hours  dexamethasone  IVPB 8 milliGRAM(s) IV Intermittent once  dextrose 5%. 1000 milliLiter(s) (50 mL/Hr) IV Continuous <Continuous>  dextrose 50% Injectable 12.5 Gram(s) IV Push once  dextrose 50% Injectable 25 Gram(s) IV Push once  dextrose 50% Injectable 25 Gram(s) IV Push once  docusate sodium 100 milliGRAM(s) Oral three times a day  gabapentin 200 milliGRAM(s) Oral every 8 hours  heparin  Injectable 5000 Unit(s) SubCutaneous every 8 hours  insulin glargine Injectable (LANTUS) 22 Unit(s) SubCutaneous at bedtime  insulin lispro (HumaLOG) corrective regimen sliding scale   SubCutaneous three times a day before meals  insulin lispro (HumaLOG) corrective regimen sliding scale   SubCutaneous at bedtime  insulin lispro Injectable (HumaLOG) 6 Unit(s) SubCutaneous three times a day before meals  lidocaine   Patch 2 Patch Transdermal daily  methocarbamol 750 milliGRAM(s) Oral every 8 hours  metoprolol succinate ER 50 milliGRAM(s) Oral daily  pantoprazole    Tablet 40 milliGRAM(s) Oral two times a day  senna 2 Tablet(s) Oral at bedtime    MEDICATIONS  (PRN):  dextrose 40% Gel 15 Gram(s) Oral once PRN Blood Glucose LESS THAN 70 milliGRAM(s)/deciliter  glucagon  Injectable 1 milliGRAM(s) IntraMuscular once PRN Glucose LESS THAN 70 milligrams/deciliter  HYDROmorphone  Injectable 1 milliGRAM(s) SubCutaneous every 4 hours PRN Severe Pain (7 - 10)  ondansetron Injectable 8 milliGRAM(s) IV Push every 6 hours PRN Nausea and/or Vomiting  oxyCODONE    IR 5 milliGRAM(s) Oral every 4 hours PRN Mild Pain (1 - 3)  oxyCODONE    IR 10 milliGRAM(s) Oral every 4 hours PRN Moderate Pain (4 - 6)  polyethylene glycol 3350 17 Gram(s) Oral daily PRN Constipation      RADIOLOGY & ADDITIONAL TESTS:  < from: CT Lumbar Spine No Cont (01.25.19 @ 13:43) >  EXAM:  CT LUMBAR SPINE                            PROCEDURE DATE:  01/25/2019          INTERPRETATION:    CT lumbar without contrast    CLINICAL INFORMATION:       Lumbar spondylosis.    TECHNIQUE: CT of the lumbar spine was performed with axial   reconstructions. 2-D sagittal and coronal reformatted images were also   obtained.    FINDINGS:   No prior similar studies are available for review.         Lumbar vertebral alignment is significant for grade 1   anterospondylolisthesis at L4-5 on a degenerative basis.  Lumbar   vertebral body heights are maintained. No destructive lesions are noted.   There is calcification of the anterior longitudinal ligaments in the   lower thoracic upper lumbar spine.    Lumbar intervertebral discs show disc degeneration and spondylosis at   T12-L1 through L5-S1 with loss of disc height and associated degenerative   endplate changes. Disc bulges are noted at these levels which flatten the   ventral thecal sac and narrow the BILATERAL neural foramina. Mildcentral   stenosis noted at L2-3 and severe central stenosis noted at L3-4 and L4-5   on a degenerative basis due to facet osteophyte disc bulge, facet   osteophytic hypertrophy and redundancy of ligamentum flavum. Marked facet   osteophytic hypertrophy is noted at L5-S1.    The paraspinal soft tissues are intact.    IMPRESSION:  Grade 1 anterospondylolisthesis at L4-5 on a degenerative   basis.  Disc degeneration and spondylosis at T12-L1 through L5-S1 with   loss of disc height and associated degenerative endplate changes. Disc   bulges are noted at these levels which flatten the ventral thecal sac and   narrow the BILATERAL neural foramina. Mild central stenosis noted at L2-3   and severe central stenosis noted at L3-4 and L4-5 on a degenerative   basis due to facet osteophyte disc bulge, facet osteophytic hypertrophy   and redundancy of ligamentum flavum. Marked facet osteophytic hypertrophy   is noted at L5-S1.          EXAM:  XR CHEST PORTABLE URGENT 1V                        PROCEDURE DATE:  01/25/2019      Findings:  The heart is mildly prominent.  The lungs are grossly clear. The apices   and hemidiaphragms are unremarkable. Degenerative changes of the   visualized osseous structures.        Impression:  No acute disease  No significant interval change as compared to  1/29/2017

## 2019-01-28 LAB
ABO RH CONFIRMATION: SIGNIFICANT CHANGE UP
ANION GAP SERPL CALC-SCNC: 7 MMOL/L — SIGNIFICANT CHANGE UP (ref 5–17)
BLD GP AB SCN SERPL QL: SIGNIFICANT CHANGE UP
BUN SERPL-MCNC: 25 MG/DL — HIGH (ref 7–23)
CALCIUM SERPL-MCNC: 8.6 MG/DL — SIGNIFICANT CHANGE UP (ref 8.5–10.1)
CHLORIDE SERPL-SCNC: 99 MMOL/L — SIGNIFICANT CHANGE UP (ref 96–108)
CO2 SERPL-SCNC: 29 MMOL/L — SIGNIFICANT CHANGE UP (ref 22–31)
CREAT SERPL-MCNC: 0.6 MG/DL — SIGNIFICANT CHANGE UP (ref 0.5–1.3)
GLUCOSE BLDC GLUCOMTR-MCNC: 251 MG/DL — HIGH (ref 70–99)
GLUCOSE BLDC GLUCOMTR-MCNC: 274 MG/DL — HIGH (ref 70–99)
GLUCOSE BLDC GLUCOMTR-MCNC: 275 MG/DL — HIGH (ref 70–99)
GLUCOSE BLDC GLUCOMTR-MCNC: 291 MG/DL — HIGH (ref 70–99)
GLUCOSE SERPL-MCNC: 265 MG/DL — HIGH (ref 70–99)
HCT VFR BLD CALC: 35.4 % — SIGNIFICANT CHANGE UP (ref 34.5–45)
HGB BLD-MCNC: 12.1 G/DL — SIGNIFICANT CHANGE UP (ref 11.5–15.5)
MCHC RBC-ENTMCNC: 30.3 PG — SIGNIFICANT CHANGE UP (ref 27–34)
MCHC RBC-ENTMCNC: 34.2 GM/DL — SIGNIFICANT CHANGE UP (ref 32–36)
MCV RBC AUTO: 88.5 FL — SIGNIFICANT CHANGE UP (ref 80–100)
NRBC # BLD: 0 /100 WBCS — SIGNIFICANT CHANGE UP (ref 0–0)
PLATELET # BLD AUTO: 247 K/UL — SIGNIFICANT CHANGE UP (ref 150–400)
POTASSIUM SERPL-MCNC: 4.2 MMOL/L — SIGNIFICANT CHANGE UP (ref 3.5–5.3)
POTASSIUM SERPL-SCNC: 4.2 MMOL/L — SIGNIFICANT CHANGE UP (ref 3.5–5.3)
RBC # BLD: 4 M/UL — SIGNIFICANT CHANGE UP (ref 3.8–5.2)
RBC # FLD: 12 % — SIGNIFICANT CHANGE UP (ref 10.3–14.5)
SODIUM SERPL-SCNC: 135 MMOL/L — SIGNIFICANT CHANGE UP (ref 135–145)
TYPE + AB SCN PNL BLD: SIGNIFICANT CHANGE UP
WBC # BLD: 8.47 K/UL — SIGNIFICANT CHANGE UP (ref 3.8–10.5)
WBC # FLD AUTO: 8.47 K/UL — SIGNIFICANT CHANGE UP (ref 3.8–10.5)

## 2019-01-28 PROCEDURE — 99231 SBSQ HOSP IP/OBS SF/LOW 25: CPT | Mod: 57

## 2019-01-28 PROCEDURE — 93970 EXTREMITY STUDY: CPT | Mod: 26

## 2019-01-28 RX ORDER — LANOLIN ALCOHOL/MO/W.PET/CERES
3 CREAM (GRAM) TOPICAL ONCE
Qty: 0 | Refills: 0 | Status: COMPLETED | OUTPATIENT
Start: 2019-01-28 | End: 2019-01-28

## 2019-01-28 RX ADMIN — HEPARIN SODIUM 5000 UNIT(S): 5000 INJECTION INTRAVENOUS; SUBCUTANEOUS at 06:24

## 2019-01-28 RX ADMIN — OXYCODONE HYDROCHLORIDE 10 MILLIGRAM(S): 5 TABLET ORAL at 12:13

## 2019-01-28 RX ADMIN — METHOCARBAMOL 750 MILLIGRAM(S): 500 TABLET, FILM COATED ORAL at 22:00

## 2019-01-28 RX ADMIN — OXYCODONE HYDROCHLORIDE 10 MILLIGRAM(S): 5 TABLET ORAL at 06:26

## 2019-01-28 RX ADMIN — Medication 2 MILLIGRAM(S): at 22:02

## 2019-01-28 RX ADMIN — Medication 100 MILLIGRAM(S): at 06:23

## 2019-01-28 RX ADMIN — Medication 100 MILLIGRAM(S): at 22:00

## 2019-01-28 RX ADMIN — Medication 6 UNIT(S): at 17:01

## 2019-01-28 RX ADMIN — Medication 3 MILLIGRAM(S): at 00:35

## 2019-01-28 RX ADMIN — Medication 50 MILLIGRAM(S): at 06:23

## 2019-01-28 RX ADMIN — OXYCODONE HYDROCHLORIDE 10 MILLIGRAM(S): 5 TABLET ORAL at 23:00

## 2019-01-28 RX ADMIN — METHOCARBAMOL 750 MILLIGRAM(S): 500 TABLET, FILM COATED ORAL at 06:23

## 2019-01-28 RX ADMIN — Medication 6: at 08:03

## 2019-01-28 RX ADMIN — Medication 6: at 17:00

## 2019-01-28 RX ADMIN — HYDROMORPHONE HYDROCHLORIDE 2 MILLIGRAM(S): 2 INJECTION INTRAMUSCULAR; INTRAVENOUS; SUBCUTANEOUS at 15:40

## 2019-01-28 RX ADMIN — Medication 2 MILLIGRAM(S): at 06:24

## 2019-01-28 RX ADMIN — Medication 100 MILLIGRAM(S): at 12:09

## 2019-01-28 RX ADMIN — HYDROMORPHONE HYDROCHLORIDE 2 MILLIGRAM(S): 2 INJECTION INTRAMUSCULAR; INTRAVENOUS; SUBCUTANEOUS at 15:47

## 2019-01-28 RX ADMIN — OXYCODONE HYDROCHLORIDE 10 MILLIGRAM(S): 5 TABLET ORAL at 22:03

## 2019-01-28 RX ADMIN — AMLODIPINE BESYLATE 5 MILLIGRAM(S): 2.5 TABLET ORAL at 06:23

## 2019-01-28 RX ADMIN — OXYCODONE HYDROCHLORIDE 10 MILLIGRAM(S): 5 TABLET ORAL at 12:08

## 2019-01-28 RX ADMIN — Medication 6 UNIT(S): at 08:04

## 2019-01-28 RX ADMIN — Medication 2 MILLIGRAM(S): at 12:07

## 2019-01-28 RX ADMIN — INSULIN GLARGINE 22 UNIT(S): 100 INJECTION, SOLUTION SUBCUTANEOUS at 22:02

## 2019-01-28 RX ADMIN — Medication 2: at 22:10

## 2019-01-28 RX ADMIN — Medication 6: at 12:06

## 2019-01-28 RX ADMIN — GABAPENTIN 200 MILLIGRAM(S): 400 CAPSULE ORAL at 12:08

## 2019-01-28 RX ADMIN — Medication 650 MILLIGRAM(S): at 06:23

## 2019-01-28 RX ADMIN — ATORVASTATIN CALCIUM 20 MILLIGRAM(S): 80 TABLET, FILM COATED ORAL at 22:00

## 2019-01-28 RX ADMIN — SENNA PLUS 2 TABLET(S): 8.6 TABLET ORAL at 22:00

## 2019-01-28 RX ADMIN — POLYETHYLENE GLYCOL 3350 17 GRAM(S): 17 POWDER, FOR SOLUTION ORAL at 00:35

## 2019-01-28 RX ADMIN — PANTOPRAZOLE SODIUM 40 MILLIGRAM(S): 20 TABLET, DELAYED RELEASE ORAL at 17:01

## 2019-01-28 RX ADMIN — HEPARIN SODIUM 5000 UNIT(S): 5000 INJECTION INTRAVENOUS; SUBCUTANEOUS at 12:08

## 2019-01-28 RX ADMIN — PANTOPRAZOLE SODIUM 40 MILLIGRAM(S): 20 TABLET, DELAYED RELEASE ORAL at 06:24

## 2019-01-28 RX ADMIN — Medication 650 MILLIGRAM(S): at 23:06

## 2019-01-28 RX ADMIN — GABAPENTIN 200 MILLIGRAM(S): 400 CAPSULE ORAL at 06:23

## 2019-01-28 RX ADMIN — Medication 6 UNIT(S): at 12:06

## 2019-01-28 RX ADMIN — METHOCARBAMOL 750 MILLIGRAM(S): 500 TABLET, FILM COATED ORAL at 12:07

## 2019-01-28 NOTE — PROGRESS NOTE ADULT - SUBJECTIVE AND OBJECTIVE BOX
HPI: 62 yo female previously seen by Dr Salcedo in office (11/2018) with severe lower back. MRI at that time sig for severe spinal stenosis at L 4-5 secondary to a grade I spondylolistheses, moderate stenosis at L2-3, L3-4. Flexion/ extension xrays demonstrates stability. Patient tried to get epidural injection at two facilities but her insurance denied the EDSIs per the patient. Today the pain was so severe that she could no longer walk or bear weight on her left leg due to the pain. She tried Meloxicam with no relief. Patient denies any narcotics or medications. The pain is worse with ambulation and mildly improved with lying in bed. The back pain radiates down bilateral groin anterior thighs to knees. She denies any numbness to extremities. She denies any bowel or bladder incontinence, denies loss in sensation in pubic or rectal area.    1/26 - Pt seen and examined earlier today, in NAD. With use of  phone discussed possible surgical intervention which pt was agreeable to. She appears comfortable but admits to Left buttock / LE pain, no new complaints, no overnight events    1/27 Pt seen and examined, no acute events ON. Family at bedtime, per patient she states oral percocet lasts about 2-3 hours for her pain. She reports pain in her left buttock radiating to her L knee.     1/28- Pt seen and examined with Dr. SALCEDO, continued pain radiating down left leg,  phone was used- plan is to proceed with surgical intervention tomorrow    Vital Signs Last 24 Hrs  T(C): 36.5 (28 Jan 2019 11:31), Max: 36.9 (27 Jan 2019 17:42)  T(F): 97.7 (28 Jan 2019 11:31), Max: 98.5 (27 Jan 2019 17:42)  HR: 63 (28 Jan 2019 11:31) (57 - 75)  BP: 135/59 (28 Jan 2019 11:31) (129/55 - 148/82)  RR: 17 (28 Jan 2019 11:31) (16 - 18)  SpO2: 95% (28 Jan 2019 11:31) (94% - 97%)    MEDICATIONS  (STANDING):  acetaminophen   Tablet .. 650 milliGRAM(s) Oral every 6 hours  amLODIPine   Tablet 5 milliGRAM(s) Oral daily  atorvastatin 20 milliGRAM(s) Oral at bedtime  dexamethasone  Injectable 2 milliGRAM(s) IV Push every 8 hours  docusate sodium 100 milliGRAM(s) Oral three times a day  gabapentin 200 milliGRAM(s) Oral every 8 hours  heparin  Injectable 5000 Unit(s) SubCutaneous every 8 hours  insulin glargine Injectable (LANTUS) 22 Unit(s) SubCutaneous at bedtime  insulin lispro (HumaLOG) corrective regimen sliding scale   SubCutaneous three times a day before meals  insulin lispro (HumaLOG) corrective regimen sliding scale   SubCutaneous at bedtime  insulin lispro Injectable (HumaLOG) 6 Unit(s) SubCutaneous three times a day before meals  lidocaine   Patch 2 Patch Transdermal daily  methocarbamol 750 milliGRAM(s) Oral every 8 hours  metoprolol succinate ER 50 milliGRAM(s) Oral daily  pantoprazole    Tablet 40 milliGRAM(s) Oral two times a day  senna 2 Tablet(s) Oral at bedtime    MEDICATIONS  (PRN):  dextrose 40% Gel 15 Gram(s) Oral once PRN Blood Glucose LESS THAN 70 milliGRAM(s)/deciliter  glucagon  Injectable 1 milliGRAM(s) IntraMuscular once PRN Glucose LESS THAN 70 milligrams/deciliter  HYDROmorphone  Injectable 2 milliGRAM(s) IV Push every 4 hours PRN Severe Pain (7 - 10)  ondansetron Injectable 8 milliGRAM(s) IV Push every 6 hours PRN Nausea and/or Vomiting  oxyCODONE    IR 5 milliGRAM(s) Oral every 4 hours PRN Mild Pain (1 - 3)  oxyCODONE    IR 10 milliGRAM(s) Oral every 4 hours PRN Moderate Pain (4 - 6)  polyethylene glycol 3350 17 Gram(s) Oral daily PRN Constipation    PHYSICAL EXAM:  Constitutional: Awake, alert sitting up in chair  HEENT: PERRLA, EOMI  Neck: Supple  Respiratory: Breath sounds are clear bilaterally  Cardiovascular: S1 and S2, regular rhythm  Gastrointestinal: Obese, soft, NT/ND  Extremities:  no edema  Musculoskeletal: no joint swelling/tenderness, no abnormal movements  Skin: No rashes    Neurological Exam:  HF: A x O x 3, appropriately interactive, normal affect, speech fluent  CN: PERRL, EOMI, facial sensation normal, no NLFD, tongue midline  Motor: No pronator drift, Strength 5/5 in all 4 ext except Left DF/EHL 4/5, L IP 4/5, L QD 4/5 limited 2/2 pain, normal bulk and tone, no tremor, rigidity or bradykinesia, neg clonus  Sens: Dec sens Left later thigh, otherwise intact to light touch  Gait/Balance: Cannot test      LABS:                         12.1   8.47  )-----------( 247      ( 28 Jan 2019 07:36 )             35.4     01-28    135  |  99  |  25<H>  ----------------------------<  265<H>  4.2   |  29  |  0.60    Ca    8.6      28 Jan 2019 07:36    01-25 [id number] 8.5    RADIOLOGY:  < from: US Duplex Venous Lower Ext Complete, Bilateral (01.28.19 @ 10:24) >  There is normal compressibility of the bilateral common femoral, femoral   and popliteal veins. No calf vein thrombosis is detected.  Doppler examination shows normal spontaneous and phasic flow.    < from: CT Lumbar Spine No Cont (01.25.19 @ 13:43) >  IMPRESSION:  Grade 1 anterospondylolisthesis at L4-5 on a degenerative   basis.  Disc degeneration and spondylosis at T12-L1 through L5-S1 with   loss of disc height and associated degenerative endplate changes. Disc   bulges are noted at these levels which flatten the ventral thecal sac and   narrow the BILATERAL neural foramina. Mild central stenosis noted at L2-3   and severe central stenosis noted at L3-4 and L4-5 on a degenerative   basis due to facet osteophyte disc bulge, facet osteophytic hypertrophy   and redundancy of ligamentum flavum. Marked facet osteophytic hypertrophy   is noted at L5-S1.

## 2019-01-28 NOTE — PROGRESS NOTE ADULT - ASSESSMENT
Pt is a 62y/o  woman w/ PMHx HTN,  DM II, HLD with chronic back due to severe Spinal stenosis  admitted for:       I.  Acute on chronic lower back pain, sciatica, Inability to ambulate  - CT reviewed.   - Neuro Sx f/u appreciated,  plan for OR in am   - Started on decadron 8mg IVP x 1 and then 2 mg Q 8H  - C/w pain meds and muscle relaxants  - c/w  Neurontin    - physical therapy        II. DM   -HB A1c 8.5   - Hold oral hypoglycemics   - C/w Lantus, will give lower dose due to NPO status after midnight   - C/w  sliding scale   QAC and QHS     III. HTN  - cont meds: on amlodipine and Metoprolol     IV. HLD  - c/w Lipitor     V. B/l calf tenderness  - c Dopplers: neg for  DVT     VI. GERD  Protonix increase to BID     VII. DVT prophylaxis  - heparin, hold for procedure       CLEARANCE: Pt has no H/o or current Heart or  Lung issues. Had CArdiac  work up done about year ago  and was neg. No active chest pain or SOB. EKG this admission: SR, no acute changes.   CXR: negative. Pt is medically optimized for procedure     Dispo: NPO after midnight, Hold Heparin as per Sx, labs in am

## 2019-01-28 NOTE — PROGRESS NOTE ADULT - SUBJECTIVE AND OBJECTIVE BOX
CC: difficulty ambulating (25 Jan 2019 20:32)    HPI:  Pt is a 62y/o  woman w/ PMHx HTN,  DM II, HLD with chronic back pain who presents to the ED  c/o  worsening  left  leg pain with difficulty ambulating  x4 days.  She c/o  leg pain associated w/ warmth, numbness, tingling.   She reports she did see Neurosurgery and was told she may need surgery.  She denies bowel/bladder incontinence but does report constipation and hemorrhoids.    She denies  recent traumas or falls.   Pt was unable to ambulate in the ED due to weakness.      She denies HA, dizziness, CP, SOB, N/V/D, fever or chills.  She denies sick contacts and no recent travel.           INTERVAL HPI/ OVERNIGHT EVENTS:  Pt was seen and examined,  used.  Just stood up with PT and was not able to ambulate much due to pain, looks  uncomfortable, states pain 10/10, pain meds improving pain. Pt was seen by Sx team in am and Sx and Pt agreed to SX procedure, planned in am  Also repeated that has nop H/o Lung or heart Dz. HAd episode of chest discomfort about 1 year ago and had cardiac work up including exercise stress test and was neg. Reports no recent CP or SOB. Was able to climb stairs w/o limitations before back issues.   Also reports no BM     Vital Signs Last 24 Hrs  T(C): 36.9 (28 Jan 2019 17:39), Max: 36.9 (28 Jan 2019 17:39)  T(F): 98.4 (28 Jan 2019 17:39), Max: 98.4 (28 Jan 2019 17:39)  HR: 54 (28 Jan 2019 17:39) (54 - 75)  BP: 154/60 (28 Jan 2019 17:39) (129/55 - 154/60)  RR: 17 (28 Jan 2019 17:39) (16 - 17)  SpO2: 93% (28 Jan 2019 17:39) (93% - 95%)      REVIEW OF SYSTEMS:  All other review of systems is negative unless indicated above.        PHYSICAL EXAM:  General: Well developed; well nourished; NAD   Eyes: PERRLA, EOMI; conjunctiva and sclera clear  Head: Normocephalic; atraumatic  ENMT: No nasal discharge; airway clear  Neck: Supple; non tender; no masses  Respiratory: Good air entry. No wheezes, rales or rhonchi  Cardiovascular: Regular rate and rhythm. S1 and S2 Normal; No murmurs  Gastrointestinal: Soft non-tender non-distended; Normal bowel sounds  Genitourinary: No costovertebral angle tenderness  Extremities: decreased range of motion LLE due to pain,   no edema  Vascular: Peripheral pulses palpable 2+ bilaterally  Neurological: Alert and oriented x4, non focal   Skin: Warm and dry. No acute rash  Lymph Nodes: No acute cervical adenopathy  Musculoskeletal: Normal muscle tone, without deformities  Psychiatric: Cooperative and appropriate        LAbs;                          12.1   8.47  )-----------( 247      ( 28 Jan 2019 07:36 )             35.4     01-28    135  |  99  |  25<H>  ----------------------------<  265<H>  4.2   |  29  |  0.60    Ca    8.6      28 Jan 2019 07:36                       13.5   8.39  )-----------( 240      ( 25 Jan 2019 15:44 )             39.4     25 Jan 2019 15:44    139    |  104    |  12     ----------------------------<  134    3.8     |  29     |  0.49     Ca    8.9        25 Jan 2019 15:44           Hemoglobin A1C, Whole Blood: 8.5 % (01-25-19 @ 20:24)    MEDICATIONS  (STANDING):  acetaminophen   Tablet .. 650 milliGRAM(s) Oral every 6 hours  amLODIPine   Tablet 5 milliGRAM(s) Oral daily  atorvastatin 20 milliGRAM(s) Oral at bedtime  dexamethasone  Injectable 2 milliGRAM(s) IV Push every 8 hours  dexamethasone  IVPB 8 milliGRAM(s) IV Intermittent once  dextrose 5%. 1000 milliLiter(s) (50 mL/Hr) IV Continuous <Continuous>  dextrose 50% Injectable 12.5 Gram(s) IV Push once  dextrose 50% Injectable 25 Gram(s) IV Push once  dextrose 50% Injectable 25 Gram(s) IV Push once  docusate sodium 100 milliGRAM(s) Oral three times a day  gabapentin 200 milliGRAM(s) Oral every 8 hours  heparin  Injectable 5000 Unit(s) SubCutaneous every 8 hours  insulin glargine Injectable (LANTUS) 22 Unit(s) SubCutaneous at bedtime  insulin lispro (HumaLOG) corrective regimen sliding scale   SubCutaneous three times a day before meals  insulin lispro (HumaLOG) corrective regimen sliding scale   SubCutaneous at bedtime  insulin lispro Injectable (HumaLOG) 6 Unit(s) SubCutaneous three times a day before meals  lidocaine   Patch 2 Patch Transdermal daily  methocarbamol 750 milliGRAM(s) Oral every 8 hours  metoprolol succinate ER 50 milliGRAM(s) Oral daily  pantoprazole    Tablet 40 milliGRAM(s) Oral two times a day  senna 2 Tablet(s) Oral at bedtime    MEDICATIONS  (PRN):  dextrose 40% Gel 15 Gram(s) Oral once PRN Blood Glucose LESS THAN 70 milliGRAM(s)/deciliter  glucagon  Injectable 1 milliGRAM(s) IntraMuscular once PRN Glucose LESS THAN 70 milligrams/deciliter  HYDROmorphone  Injectable 1 milliGRAM(s) SubCutaneous every 4 hours PRN Severe Pain (7 - 10)  ondansetron Injectable 8 milliGRAM(s) IV Push every 6 hours PRN Nausea and/or Vomiting  oxyCODONE    IR 5 milliGRAM(s) Oral every 4 hours PRN Mild Pain (1 - 3)  oxyCODONE    IR 10 milliGRAM(s) Oral every 4 hours PRN Moderate Pain (4 - 6)  polyethylene glycol 3350 17 Gram(s) Oral daily PRN Constipation      RADIOLOGY & ADDITIONAL TESTS:    EXAM:  CT LUMBAR SPINE                        PROCEDURE DATE:  01/25/2019          INTERPRETATION:    CT lumbar without contrast    CLINICAL INFORMATION:       Lumbar spondylosis.    TECHNIQUE: CT of the lumbar spine was performed with axial   reconstructions. 2-D sagittal and coronal reformatted images were also   obtained.    FINDINGS:   No prior similar studies are available for review.         Lumbar vertebral alignment is significant for grade 1   anterospondylolisthesis at L4-5 on a degenerative basis.  Lumbar   vertebral body heights are maintained. No destructive lesions are noted.   There is calcification of the anterior longitudinal ligaments in the   lower thoracic upper lumbar spine.    Lumbar intervertebral discs show disc degeneration and spondylosis at   T12-L1 through L5-S1 with loss of disc height and associated degenerative   endplate changes. Disc bulges are noted at these levels which flatten the   ventral thecal sac and narrow the BILATERAL neural foramina. Mildcentral   stenosis noted at L2-3 and severe central stenosis noted at L3-4 and L4-5   on a degenerative basis due to facet osteophyte disc bulge, facet   osteophytic hypertrophy and redundancy of ligamentum flavum. Marked facet   osteophytic hypertrophy is noted at L5-S1.    The paraspinal soft tissues are intact.    IMPRESSION:  Grade 1 anterospondylolisthesis at L4-5 on a degenerative   basis.  Disc degeneration and spondylosis at T12-L1 through L5-S1 with   loss of disc height and associated degenerative endplate changes. Disc   bulges are noted at these levels which flatten the ventral thecal sac and   narrow the BILATERAL neural foramina. Mild central stenosis noted at L2-3   and severe central stenosis noted at L3-4 and L4-5 on a degenerative   basis due to facet osteophyte disc bulge, facet osteophytic hypertrophy   and redundancy of ligamentum flavum. Marked facet osteophytic hypertrophy   is noted at L5-S1.          EXAM:  XR CHEST PORTABLE URGENT 1V                        PROCEDURE DATE:  01/25/2019      Findings:  The heart is mildly prominent.  The lungs are grossly clear. The apices   and hemidiaphragms are unremarkable. Degenerative changes of the   visualized osseous structures.        Impression:  No acute disease  No significant interval change as compared to  1/29/2017

## 2019-01-28 NOTE — PROGRESS NOTE ADULT - ASSESSMENT
64 yo female previously seen by Dr Shoemaker in office (11/2018) with severe lower back. MRI at that time sig for severe spinal stenosis at L 4-5 secondary to a grade I spondylolistheses, moderate stenosis at L2-3, L3-4. Flexion/ extension xrays demonstrates stability.     Pt has continued pain with conservative/medical management, plan is to proceed with surgery   one level decompressive laminectomy  NPO after midnight  Hold SQ heparin in AM  Type and Screen   OR tomorrow afternoon     Dr. Shoemaker has seen and examined the patient and has reviewed all imaging  He spoke with the patient via a  phone in order to discuss the next step in relieving the patient's pain  Pt agrees with plan to more forward with surgery.

## 2019-01-29 ENCOUNTER — APPOINTMENT (OUTPATIENT)
Dept: NEUROSURGERY | Facility: HOSPITAL | Age: 64
End: 2019-01-29
Payer: COMMERCIAL

## 2019-01-29 LAB
ANION GAP SERPL CALC-SCNC: 6 MMOL/L — SIGNIFICANT CHANGE UP (ref 5–17)
APTT BLD: 24.9 SEC — LOW (ref 27.5–36.3)
BUN SERPL-MCNC: 21 MG/DL — SIGNIFICANT CHANGE UP (ref 7–23)
CALCIUM SERPL-MCNC: 8.9 MG/DL — SIGNIFICANT CHANGE UP (ref 8.5–10.1)
CHLORIDE SERPL-SCNC: 98 MMOL/L — SIGNIFICANT CHANGE UP (ref 96–108)
CO2 SERPL-SCNC: 30 MMOL/L — SIGNIFICANT CHANGE UP (ref 22–31)
CREAT SERPL-MCNC: 0.51 MG/DL — SIGNIFICANT CHANGE UP (ref 0.5–1.3)
GLUCOSE BLDC GLUCOMTR-MCNC: 187 MG/DL — HIGH (ref 70–99)
GLUCOSE BLDC GLUCOMTR-MCNC: 234 MG/DL — HIGH (ref 70–99)
GLUCOSE BLDC GLUCOMTR-MCNC: 234 MG/DL — HIGH (ref 70–99)
GLUCOSE BLDC GLUCOMTR-MCNC: 244 MG/DL — HIGH (ref 70–99)
GLUCOSE SERPL-MCNC: 252 MG/DL — HIGH (ref 70–99)
HCT VFR BLD CALC: 38.3 % — SIGNIFICANT CHANGE UP (ref 34.5–45)
HGB BLD-MCNC: 13.1 G/DL — SIGNIFICANT CHANGE UP (ref 11.5–15.5)
INR BLD: 1.03 RATIO — SIGNIFICANT CHANGE UP (ref 0.88–1.16)
MCHC RBC-ENTMCNC: 29.8 PG — SIGNIFICANT CHANGE UP (ref 27–34)
MCHC RBC-ENTMCNC: 34.2 GM/DL — SIGNIFICANT CHANGE UP (ref 32–36)
MCV RBC AUTO: 87 FL — SIGNIFICANT CHANGE UP (ref 80–100)
NRBC # BLD: 0 /100 WBCS — SIGNIFICANT CHANGE UP (ref 0–0)
PLATELET # BLD AUTO: 245 K/UL — SIGNIFICANT CHANGE UP (ref 150–400)
POTASSIUM SERPL-MCNC: 4.2 MMOL/L — SIGNIFICANT CHANGE UP (ref 3.5–5.3)
POTASSIUM SERPL-SCNC: 4.2 MMOL/L — SIGNIFICANT CHANGE UP (ref 3.5–5.3)
PROTHROM AB SERPL-ACNC: 11.4 SEC — SIGNIFICANT CHANGE UP (ref 10–12.9)
RBC # BLD: 4.4 M/UL — SIGNIFICANT CHANGE UP (ref 3.8–5.2)
RBC # FLD: 11.9 % — SIGNIFICANT CHANGE UP (ref 10.3–14.5)
SODIUM SERPL-SCNC: 134 MMOL/L — LOW (ref 135–145)
WBC # BLD: 10.33 K/UL — SIGNIFICANT CHANGE UP (ref 3.8–10.5)
WBC # FLD AUTO: 10.33 K/UL — SIGNIFICANT CHANGE UP (ref 3.8–10.5)

## 2019-01-29 PROCEDURE — 63047 LAM FACETEC & FORAMOT LUMBAR: CPT

## 2019-01-29 RX ORDER — DEXTROSE MONOHYDRATE, SODIUM CHLORIDE, AND POTASSIUM CHLORIDE 50; .745; 4.5 G/1000ML; G/1000ML; G/1000ML
1000 INJECTION, SOLUTION INTRAVENOUS
Qty: 0 | Refills: 0 | Status: DISCONTINUED | OUTPATIENT
Start: 2019-01-29 | End: 2019-01-30

## 2019-01-29 RX ORDER — OXYCODONE HYDROCHLORIDE 5 MG/1
5 TABLET ORAL ONCE
Qty: 0 | Refills: 0 | Status: DISCONTINUED | OUTPATIENT
Start: 2019-01-29 | End: 2019-01-29

## 2019-01-29 RX ORDER — FENTANYL CITRATE 50 UG/ML
50 INJECTION INTRAVENOUS
Qty: 0 | Refills: 0 | Status: DISCONTINUED | OUTPATIENT
Start: 2019-01-29 | End: 2019-01-29

## 2019-01-29 RX ORDER — SODIUM CHLORIDE 9 MG/ML
1000 INJECTION, SOLUTION INTRAVENOUS
Qty: 0 | Refills: 0 | Status: DISCONTINUED | OUTPATIENT
Start: 2019-01-29 | End: 2019-01-29

## 2019-01-29 RX ORDER — ONDANSETRON 8 MG/1
4 TABLET, FILM COATED ORAL ONCE
Qty: 0 | Refills: 0 | Status: DISCONTINUED | OUTPATIENT
Start: 2019-01-29 | End: 2019-01-29

## 2019-01-29 RX ORDER — DEXAMETHASONE 0.5 MG/5ML
2 ELIXIR ORAL EVERY 24 HOURS
Qty: 0 | Refills: 0 | Status: DISCONTINUED | OUTPATIENT
Start: 2019-01-29 | End: 2019-01-31

## 2019-01-29 RX ORDER — SODIUM CHLORIDE 9 MG/ML
1000 INJECTION INTRAMUSCULAR; INTRAVENOUS; SUBCUTANEOUS
Qty: 0 | Refills: 0 | Status: DISCONTINUED | OUTPATIENT
Start: 2019-01-29 | End: 2019-01-31

## 2019-01-29 RX ADMIN — FENTANYL CITRATE 50 MICROGRAM(S): 50 INJECTION INTRAVENOUS at 18:26

## 2019-01-29 RX ADMIN — FENTANYL CITRATE 50 MICROGRAM(S): 50 INJECTION INTRAVENOUS at 17:17

## 2019-01-29 RX ADMIN — OXYCODONE HYDROCHLORIDE 10 MILLIGRAM(S): 5 TABLET ORAL at 18:31

## 2019-01-29 RX ADMIN — Medication 50 MILLIGRAM(S): at 06:16

## 2019-01-29 RX ADMIN — HYDROMORPHONE HYDROCHLORIDE 2 MILLIGRAM(S): 2 INJECTION INTRAMUSCULAR; INTRAVENOUS; SUBCUTANEOUS at 01:58

## 2019-01-29 RX ADMIN — Medication 4: at 06:46

## 2019-01-29 RX ADMIN — FENTANYL CITRATE 50 MICROGRAM(S): 50 INJECTION INTRAVENOUS at 18:30

## 2019-01-29 RX ADMIN — OXYCODONE HYDROCHLORIDE 10 MILLIGRAM(S): 5 TABLET ORAL at 22:57

## 2019-01-29 RX ADMIN — METHOCARBAMOL 750 MILLIGRAM(S): 500 TABLET, FILM COATED ORAL at 06:17

## 2019-01-29 RX ADMIN — FENTANYL CITRATE 50 MICROGRAM(S): 50 INJECTION INTRAVENOUS at 17:40

## 2019-01-29 RX ADMIN — FENTANYL CITRATE 50 MICROGRAM(S): 50 INJECTION INTRAVENOUS at 18:27

## 2019-01-29 RX ADMIN — OXYCODONE HYDROCHLORIDE 10 MILLIGRAM(S): 5 TABLET ORAL at 22:27

## 2019-01-29 RX ADMIN — PANTOPRAZOLE SODIUM 40 MILLIGRAM(S): 20 TABLET, DELAYED RELEASE ORAL at 06:16

## 2019-01-29 RX ADMIN — Medication 4: at 11:57

## 2019-01-29 RX ADMIN — Medication 2: at 19:11

## 2019-01-29 RX ADMIN — FENTANYL CITRATE 50 MICROGRAM(S): 50 INJECTION INTRAVENOUS at 18:40

## 2019-01-29 RX ADMIN — INSULIN GLARGINE 22 UNIT(S): 100 INJECTION, SOLUTION SUBCUTANEOUS at 22:26

## 2019-01-29 RX ADMIN — FENTANYL CITRATE 50 MICROGRAM(S): 50 INJECTION INTRAVENOUS at 18:33

## 2019-01-29 RX ADMIN — Medication 650 MILLIGRAM(S): at 06:39

## 2019-01-29 RX ADMIN — Medication 2 MILLIGRAM(S): at 06:16

## 2019-01-29 RX ADMIN — GABAPENTIN 200 MILLIGRAM(S): 400 CAPSULE ORAL at 22:27

## 2019-01-29 RX ADMIN — ATORVASTATIN CALCIUM 20 MILLIGRAM(S): 80 TABLET, FILM COATED ORAL at 22:27

## 2019-01-29 RX ADMIN — Medication 2 MILLIGRAM(S): at 13:26

## 2019-01-29 RX ADMIN — Medication 100 MILLIGRAM(S): at 06:16

## 2019-01-29 RX ADMIN — Medication 650 MILLIGRAM(S): at 19:14

## 2019-01-29 RX ADMIN — Medication 650 MILLIGRAM(S): at 06:16

## 2019-01-29 RX ADMIN — SODIUM CHLORIDE 75 MILLILITER(S): 9 INJECTION INTRAMUSCULAR; INTRAVENOUS; SUBCUTANEOUS at 11:53

## 2019-01-29 RX ADMIN — FENTANYL CITRATE 50 MICROGRAM(S): 50 INJECTION INTRAVENOUS at 19:21

## 2019-01-29 RX ADMIN — OXYCODONE HYDROCHLORIDE 10 MILLIGRAM(S): 5 TABLET ORAL at 18:33

## 2019-01-29 RX ADMIN — Medication 650 MILLIGRAM(S): at 20:08

## 2019-01-29 RX ADMIN — SODIUM CHLORIDE 75 MILLILITER(S): 9 INJECTION, SOLUTION INTRAVENOUS at 17:20

## 2019-01-29 RX ADMIN — Medication 650 MILLIGRAM(S): at 00:00

## 2019-01-29 RX ADMIN — HYDROMORPHONE HYDROCHLORIDE 2 MILLIGRAM(S): 2 INJECTION INTRAMUSCULAR; INTRAVENOUS; SUBCUTANEOUS at 02:13

## 2019-01-29 RX ADMIN — AMLODIPINE BESYLATE 5 MILLIGRAM(S): 2.5 TABLET ORAL at 06:16

## 2019-01-29 NOTE — BRIEF OPERATIVE NOTE - PROCEDURE
<<-----Click on this checkbox to enter Procedure Lumbar decompression using minimally invasive technique  01/29/2019  L4/5  Active  [id number]

## 2019-01-29 NOTE — PROGRESS NOTE ADULT - SUBJECTIVE AND OBJECTIVE BOX
CC: difficulty ambulating (25 Jan 2019 20:32)    HPI:  Pt is a 62y/o  woman w/ PMHx HTN,  DM II, HLD with chronic back pain who presents to the ED  c/o  worsening  left  leg pain with difficulty ambulating  x4 days.  She c/o  leg pain associated w/ warmth, numbness, tingling.   She reports she did see Neurosurgery and was told she may need surgery.  She denies bowel/bladder incontinence but does report constipation and hemorrhoids.    She denies  recent traumas or falls.   Pt was unable to ambulate in the ED due to weakness.      She denies HA, dizziness, CP, SOB, N/V/D, fever or chills.  She denies sick contacts and no recent travel.           INTERVAL HPI/ OVERNIGHT EVENTS:  Pt was seen and examined,  pain controlled, no other complains, awaiting for Sx     Vital Signs Last 24 Hrs  T(C): 36.8 (29 Jan 2019 10:45), Max: 36.9 (28 Jan 2019 17:39)  T(F): 98.2 (29 Jan 2019 10:45), Max: 98.4 (28 Jan 2019 17:39)  HR: 58 (29 Jan 2019 10:45) (54 - 58)  BP: 149/78 (29 Jan 2019 10:45) (149/78 - 158/70)  RR: 17 (29 Jan 2019 10:45) (17 - 17)  SpO2: 94% (29 Jan 2019 10:45) (93% - 98%)    REVIEW OF SYSTEMS:  All other review of systems is negative unless indicated above.        PHYSICAL EXAM:  General: Well developed; well nourished; NAD   Eyes: PERRLA, EOMI; conjunctiva and sclera clear  Head: Normocephalic; atraumatic  ENMT: No nasal discharge; airway clear  Neck: Supple; non tender; no masses  Respiratory: Good air entry. No wheezes, rales or rhonchi  Cardiovascular: Regular rate and rhythm. S1 and S2 Normal; No murmurs  Gastrointestinal: Soft non-tender non-distended; Normal bowel sounds  Genitourinary: No costovertebral angle tenderness  Extremities: decreased range of motion LLE due to pain,   no edema  Vascular: Peripheral pulses palpable 2+ bilaterally  Neurological: Alert and oriented x4, non focal   Skin: Warm and dry. No acute rash  Lymph Nodes: No acute cervical adenopathy  Musculoskeletal: Normal muscle tone, without deformities  Psychiatric: Cooperative and appropriate        LAbs;                          13.1   10.33 )-----------( 245      ( 29 Jan 2019 06:45 )             38.3     01-29    134<L>  |  98  |  21  ----------------------------<  252<H>  4.2   |  30  |  0.51    Ca    8.9      29 Jan 2019 06:45        PT/INR - ( 29 Jan 2019 06:45 )   PT: 11.4 sec;   INR: 1.03 ratio    PTT - ( 29 Jan 2019 06:45 )  PTT:24.9 sec                            12.1   8.47  )-----------( 247      ( 28 Jan 2019 07:36 )             35.4     01-28    135  |  99  |  25<H>  ----------------------------<  265<H>  4.2   |  29  |  0.60    Ca    8.6      28 Jan 2019 07:36                       13.5   8.39  )-----------( 240      ( 25 Jan 2019 15:44 )             39.4     25 Jan 2019 15:44    139    |  104    |  12     ----------------------------<  134    3.8     |  29     |  0.49     Ca    8.9        25 Jan 2019 15:44           Hemoglobin A1C, Whole Blood: 8.5 % (01-25-19 @ 20:24)    MEDICATIONS  (STANDING):  acetaminophen   Tablet .. 650 milliGRAM(s) Oral every 6 hours  amLODIPine   Tablet 5 milliGRAM(s) Oral daily  atorvastatin 20 milliGRAM(s) Oral at bedtime  dexamethasone  Injectable 2 milliGRAM(s) IV Push every 8 hours  dexamethasone  IVPB 8 milliGRAM(s) IV Intermittent once  dextrose 5%. 1000 milliLiter(s) (50 mL/Hr) IV Continuous <Continuous>  dextrose 50% Injectable 12.5 Gram(s) IV Push once  dextrose 50% Injectable 25 Gram(s) IV Push once  dextrose 50% Injectable 25 Gram(s) IV Push once  docusate sodium 100 milliGRAM(s) Oral three times a day  gabapentin 200 milliGRAM(s) Oral every 8 hours  heparin  Injectable 5000 Unit(s) SubCutaneous every 8 hours  insulin glargine Injectable (LANTUS) 22 Unit(s) SubCutaneous at bedtime  insulin lispro (HumaLOG) corrective regimen sliding scale   SubCutaneous three times a day before meals  insulin lispro (HumaLOG) corrective regimen sliding scale   SubCutaneous at bedtime  insulin lispro Injectable (HumaLOG) 6 Unit(s) SubCutaneous three times a day before meals  lidocaine   Patch 2 Patch Transdermal daily  methocarbamol 750 milliGRAM(s) Oral every 8 hours  metoprolol succinate ER 50 milliGRAM(s) Oral daily  pantoprazole    Tablet 40 milliGRAM(s) Oral two times a day  senna 2 Tablet(s) Oral at bedtime    MEDICATIONS  (PRN):  dextrose 40% Gel 15 Gram(s) Oral once PRN Blood Glucose LESS THAN 70 milliGRAM(s)/deciliter  glucagon  Injectable 1 milliGRAM(s) IntraMuscular once PRN Glucose LESS THAN 70 milligrams/deciliter  HYDROmorphone  Injectable 1 milliGRAM(s) SubCutaneous every 4 hours PRN Severe Pain (7 - 10)  ondansetron Injectable 8 milliGRAM(s) IV Push every 6 hours PRN Nausea and/or Vomiting  oxyCODONE    IR 5 milliGRAM(s) Oral every 4 hours PRN Mild Pain (1 - 3)  oxyCODONE    IR 10 milliGRAM(s) Oral every 4 hours PRN Moderate Pain (4 - 6)  polyethylene glycol 3350 17 Gram(s) Oral daily PRN Constipation      RADIOLOGY & ADDITIONAL TESTS:    EXAM:  CT LUMBAR SPINE                        PROCEDURE DATE:  01/25/2019          INTERPRETATION:    CT lumbar without contrast    CLINICAL INFORMATION:       Lumbar spondylosis.    TECHNIQUE: CT of the lumbar spine was performed with axial   reconstructions. 2-D sagittal and coronal reformatted images were also   obtained.    FINDINGS:   No prior similar studies are available for review.         Lumbar vertebral alignment is significant for grade 1   anterospondylolisthesis at L4-5 on a degenerative basis.  Lumbar   vertebral body heights are maintained. No destructive lesions are noted.   There is calcification of the anterior longitudinal ligaments in the   lower thoracic upper lumbar spine.    Lumbar intervertebral discs show disc degeneration and spondylosis at   T12-L1 through L5-S1 with loss of disc height and associated degenerative   endplate changes. Disc bulges are noted at these levels which flatten the   ventral thecal sac and narrow the BILATERAL neural foramina. Mildcentral   stenosis noted at L2-3 and severe central stenosis noted at L3-4 and L4-5   on a degenerative basis due to facet osteophyte disc bulge, facet   osteophytic hypertrophy and redundancy of ligamentum flavum. Marked facet   osteophytic hypertrophy is noted at L5-S1.    The paraspinal soft tissues are intact.    IMPRESSION:  Grade 1 anterospondylolisthesis at L4-5 on a degenerative   basis.  Disc degeneration and spondylosis at T12-L1 through L5-S1 with   loss of disc height and associated degenerative endplate changes. Disc   bulges are noted at these levels which flatten the ventral thecal sac and   narrow the BILATERAL neural foramina. Mild central stenosis noted at L2-3   and severe central stenosis noted at L3-4 and L4-5 on a degenerative   basis due to facet osteophyte disc bulge, facet osteophytic hypertrophy   and redundancy of ligamentum flavum. Marked facet osteophytic hypertrophy   is noted at L5-S1.          EXAM:  XR CHEST PORTABLE URGENT 1V                        PROCEDURE DATE:  01/25/2019      Findings:  The heart is mildly prominent.  The lungs are grossly clear. The apices   and hemidiaphragms are unremarkable. Degenerative changes of the   visualized osseous structures.        Impression:  No acute disease  No significant interval change as compared to  1/29/2017

## 2019-01-29 NOTE — PROGRESS NOTE ADULT - ASSESSMENT
Pt is a 62y/o  woman w/ PMHx HTN,  DM II, HLD with chronic back due to severe Spinal stenosis  admitted for:       I.  Acute on chronic lower back pain, sciatica, Inability to ambulate  - CT reviewed.   - Neuro Sx f/u appreciated,  plan for OR TODAY   - On  decadron, with no significant improvement   - C/w pain meds and muscle relaxants  - c/w  Neurontin    - PT Postop         II. DM   -HB A1c 8.5   - Hold oral hypoglycemics   - C/w Lantus, and ISS       III. HTN  - cont meds: on amlodipine and Metoprolol     IV. HLD  - c/w Lipitor     V. B/l calf tenderness  - c Dopplers: neg for  DVT     VI. GERD  Protonix increase to BID     VII. DVT prophylaxis  - heparin, hold for procedure       Dispo: NPO  for OR today, medically optimized

## 2019-01-30 LAB
ANION GAP SERPL CALC-SCNC: 7 MMOL/L — SIGNIFICANT CHANGE UP (ref 5–17)
BASOPHILS # BLD AUTO: 0.02 K/UL — SIGNIFICANT CHANGE UP (ref 0–0.2)
BASOPHILS NFR BLD AUTO: 0.1 % — SIGNIFICANT CHANGE UP (ref 0–2)
BUN SERPL-MCNC: 16 MG/DL — SIGNIFICANT CHANGE UP (ref 7–23)
CALCIUM SERPL-MCNC: 8.9 MG/DL — SIGNIFICANT CHANGE UP (ref 8.5–10.1)
CHLORIDE SERPL-SCNC: 100 MMOL/L — SIGNIFICANT CHANGE UP (ref 96–108)
CO2 SERPL-SCNC: 30 MMOL/L — SIGNIFICANT CHANGE UP (ref 22–31)
CREAT SERPL-MCNC: 0.45 MG/DL — LOW (ref 0.5–1.3)
EOSINOPHIL # BLD AUTO: 0.01 K/UL — SIGNIFICANT CHANGE UP (ref 0–0.5)
EOSINOPHIL NFR BLD AUTO: 0.1 % — SIGNIFICANT CHANGE UP (ref 0–6)
GLUCOSE BLDC GLUCOMTR-MCNC: 201 MG/DL — HIGH (ref 70–99)
GLUCOSE BLDC GLUCOMTR-MCNC: 229 MG/DL — HIGH (ref 70–99)
GLUCOSE BLDC GLUCOMTR-MCNC: 274 MG/DL — HIGH (ref 70–99)
GLUCOSE BLDC GLUCOMTR-MCNC: 275 MG/DL — HIGH (ref 70–99)
GLUCOSE SERPL-MCNC: 192 MG/DL — HIGH (ref 70–99)
HCT VFR BLD CALC: 38.6 % — SIGNIFICANT CHANGE UP (ref 34.5–45)
HGB BLD-MCNC: 13.2 G/DL — SIGNIFICANT CHANGE UP (ref 11.5–15.5)
IMM GRANULOCYTES NFR BLD AUTO: 0.5 % — SIGNIFICANT CHANGE UP (ref 0–1.5)
LYMPHOCYTES # BLD AUTO: 1.62 K/UL — SIGNIFICANT CHANGE UP (ref 1–3.3)
LYMPHOCYTES # BLD AUTO: 12.1 % — LOW (ref 13–44)
MCHC RBC-ENTMCNC: 29.9 PG — SIGNIFICANT CHANGE UP (ref 27–34)
MCHC RBC-ENTMCNC: 34.2 GM/DL — SIGNIFICANT CHANGE UP (ref 32–36)
MCV RBC AUTO: 87.3 FL — SIGNIFICANT CHANGE UP (ref 80–100)
MONOCYTES # BLD AUTO: 0.85 K/UL — SIGNIFICANT CHANGE UP (ref 0–0.9)
MONOCYTES NFR BLD AUTO: 6.4 % — SIGNIFICANT CHANGE UP (ref 2–14)
NEUTROPHILS # BLD AUTO: 10.78 K/UL — HIGH (ref 1.8–7.4)
NEUTROPHILS NFR BLD AUTO: 80.8 % — HIGH (ref 43–77)
NRBC # BLD: 0 /100 WBCS — SIGNIFICANT CHANGE UP (ref 0–0)
PLATELET # BLD AUTO: 258 K/UL — SIGNIFICANT CHANGE UP (ref 150–400)
POTASSIUM SERPL-MCNC: 4.2 MMOL/L — SIGNIFICANT CHANGE UP (ref 3.5–5.3)
POTASSIUM SERPL-SCNC: 4.2 MMOL/L — SIGNIFICANT CHANGE UP (ref 3.5–5.3)
RBC # BLD: 4.42 M/UL — SIGNIFICANT CHANGE UP (ref 3.8–5.2)
RBC # FLD: 12 % — SIGNIFICANT CHANGE UP (ref 10.3–14.5)
SODIUM SERPL-SCNC: 137 MMOL/L — SIGNIFICANT CHANGE UP (ref 135–145)
WBC # BLD: 13.35 K/UL — HIGH (ref 3.8–10.5)
WBC # FLD AUTO: 13.35 K/UL — HIGH (ref 3.8–10.5)

## 2019-01-30 RX ADMIN — Medication 650 MILLIGRAM(S): at 22:25

## 2019-01-30 RX ADMIN — METHOCARBAMOL 750 MILLIGRAM(S): 500 TABLET, FILM COATED ORAL at 05:34

## 2019-01-30 RX ADMIN — OXYCODONE HYDROCHLORIDE 10 MILLIGRAM(S): 5 TABLET ORAL at 20:10

## 2019-01-30 RX ADMIN — Medication 650 MILLIGRAM(S): at 11:08

## 2019-01-30 RX ADMIN — GABAPENTIN 200 MILLIGRAM(S): 400 CAPSULE ORAL at 13:07

## 2019-01-30 RX ADMIN — OXYCODONE HYDROCHLORIDE 10 MILLIGRAM(S): 5 TABLET ORAL at 20:40

## 2019-01-30 RX ADMIN — AMLODIPINE BESYLATE 5 MILLIGRAM(S): 2.5 TABLET ORAL at 05:36

## 2019-01-30 RX ADMIN — Medication 6 UNIT(S): at 07:41

## 2019-01-30 RX ADMIN — METHOCARBAMOL 750 MILLIGRAM(S): 500 TABLET, FILM COATED ORAL at 13:07

## 2019-01-30 RX ADMIN — METHOCARBAMOL 750 MILLIGRAM(S): 500 TABLET, FILM COATED ORAL at 00:07

## 2019-01-30 RX ADMIN — OXYCODONE HYDROCHLORIDE 10 MILLIGRAM(S): 5 TABLET ORAL at 11:55

## 2019-01-30 RX ADMIN — Medication 6 UNIT(S): at 16:09

## 2019-01-30 RX ADMIN — Medication 6 UNIT(S): at 11:07

## 2019-01-30 RX ADMIN — Medication 650 MILLIGRAM(S): at 05:35

## 2019-01-30 RX ADMIN — Medication 2 MILLIGRAM(S): at 05:39

## 2019-01-30 RX ADMIN — PANTOPRAZOLE SODIUM 40 MILLIGRAM(S): 20 TABLET, DELAYED RELEASE ORAL at 05:37

## 2019-01-30 RX ADMIN — OXYCODONE HYDROCHLORIDE 10 MILLIGRAM(S): 5 TABLET ORAL at 16:15

## 2019-01-30 RX ADMIN — INSULIN GLARGINE 22 UNIT(S): 100 INJECTION, SOLUTION SUBCUTANEOUS at 21:59

## 2019-01-30 RX ADMIN — ATORVASTATIN CALCIUM 20 MILLIGRAM(S): 80 TABLET, FILM COATED ORAL at 21:59

## 2019-01-30 RX ADMIN — GABAPENTIN 200 MILLIGRAM(S): 400 CAPSULE ORAL at 22:00

## 2019-01-30 RX ADMIN — Medication 50 MILLIGRAM(S): at 05:38

## 2019-01-30 RX ADMIN — Medication 650 MILLIGRAM(S): at 05:38

## 2019-01-30 RX ADMIN — Medication 650 MILLIGRAM(S): at 22:55

## 2019-01-30 RX ADMIN — Medication 100 MILLIGRAM(S): at 13:07

## 2019-01-30 RX ADMIN — LIDOCAINE 2 PATCH: 4 CREAM TOPICAL at 18:04

## 2019-01-30 RX ADMIN — Medication 650 MILLIGRAM(S): at 17:01

## 2019-01-30 RX ADMIN — Medication 100 MILLIGRAM(S): at 22:00

## 2019-01-30 RX ADMIN — Medication 650 MILLIGRAM(S): at 00:08

## 2019-01-30 RX ADMIN — OXYCODONE HYDROCHLORIDE 10 MILLIGRAM(S): 5 TABLET ORAL at 05:36

## 2019-01-30 RX ADMIN — Medication 650 MILLIGRAM(S): at 11:05

## 2019-01-30 RX ADMIN — LIDOCAINE 2 PATCH: 4 CREAM TOPICAL at 22:03

## 2019-01-30 RX ADMIN — Medication 6: at 16:09

## 2019-01-30 RX ADMIN — OXYCODONE HYDROCHLORIDE 10 MILLIGRAM(S): 5 TABLET ORAL at 06:06

## 2019-01-30 RX ADMIN — OXYCODONE HYDROCHLORIDE 10 MILLIGRAM(S): 5 TABLET ORAL at 11:05

## 2019-01-30 RX ADMIN — POLYETHYLENE GLYCOL 3350 17 GRAM(S): 17 POWDER, FOR SOLUTION ORAL at 13:07

## 2019-01-30 RX ADMIN — OXYCODONE HYDROCHLORIDE 10 MILLIGRAM(S): 5 TABLET ORAL at 15:39

## 2019-01-30 RX ADMIN — METHOCARBAMOL 750 MILLIGRAM(S): 500 TABLET, FILM COATED ORAL at 22:25

## 2019-01-30 RX ADMIN — LIDOCAINE 2 PATCH: 4 CREAM TOPICAL at 11:17

## 2019-01-30 RX ADMIN — Medication 650 MILLIGRAM(S): at 00:07

## 2019-01-30 RX ADMIN — Medication 4: at 11:07

## 2019-01-30 RX ADMIN — PANTOPRAZOLE SODIUM 40 MILLIGRAM(S): 20 TABLET, DELAYED RELEASE ORAL at 17:00

## 2019-01-30 RX ADMIN — Medication 2: at 21:57

## 2019-01-30 RX ADMIN — Medication 650 MILLIGRAM(S): at 17:00

## 2019-01-30 RX ADMIN — Medication 4: at 07:41

## 2019-01-30 RX ADMIN — SENNA PLUS 2 TABLET(S): 8.6 TABLET ORAL at 21:59

## 2019-01-30 NOTE — PHYSICAL THERAPY INITIAL EVALUATION ADULT - MODALITIES TREATMENT COMMENTS
Pt OOB in chair, Flowtrons B +, NAD, pt did not vomit and felt better upon sitting, CBIR, +alarm, tray table in front, RN aware
Patient returned to bed by request, call bell in reach and bed alarm active. RN informed of session.

## 2019-01-30 NOTE — PHYSICAL THERAPY INITIAL EVALUATION ADULT - DISCHARGE DISPOSITION, PT EVAL
[facility] pt lives alone
Patient would benefit from continued physical therapy  services to reach maximal potential in a Sub Acute Rehab facility/rehabilitation facility

## 2019-01-30 NOTE — PHYSICAL THERAPY INITIAL EVALUATION ADULT - ACTIVE RANGE OF MOTION EXAMINATION, REHAB EVAL
no Active ROM deficits were identified
bilateral upper extremity Active ROM was WFL (within functional limits)/bilateral  lower extremity Active ROM was WFL (within functional limits)/except L DF to neutral

## 2019-01-30 NOTE — PROGRESS NOTE ADULT - SUBJECTIVE AND OBJECTIVE BOX
CC: difficulty ambulating (25 Jan 2019 20:32)    HPI:  Pt is a 64y/o  woman w/ PMHx HTN,  DM II, HLD with chronic back pain who presents to the ED  c/o  worsening  left  leg pain with difficulty ambulating  x4 days.  She c/o  leg pain associated w/ warmth, numbness, tingling.   She reports she did see Neurosurgery and was told she may need surgery.  She denies bowel/bladder incontinence but does report constipation and hemorrhoids.    She denies  recent traumas or falls.   Pt was unable to ambulate in the ED due to weakness.      She denies HA, dizziness, CP, SOB, N/V/D, fever or chills.  She denies sick contacts and no recent travel.           INTERVAL HPI/ OVERNIGHT EVENTS:  Pt was seen and examined,  S/p Laminectomy POD # 1, tolerated procedure well, OOB to chair, was able to ambulate with walker and RN to the bathroom. Pain improved.     Vital Signs Last 24 Hrs  T(C): 36.9 (30 Jan 2019 11:13), Max: 37.2 (29 Jan 2019 20:00)  T(F): 98.5 (30 Jan 2019 11:13), Max: 98.9 (29 Jan 2019 20:00)  HR: 63 (30 Jan 2019 11:13) (52 - 69)  BP: 140/53 (30 Jan 2019 11:13) (128/56 - 149/58)  RR: 16 (30 Jan 2019 11:13) (10 - 16)  SpO2: 97% (30 Jan 2019 11:13) (92% - 100%)    REVIEW OF SYSTEMS:  All other review of systems is negative unless indicated above.        PHYSICAL EXAM:  General: Well developed; well nourished; NAD   Eyes: PERRLA, EOMI; conjunctiva and sclera clear  Head: Normocephalic; atraumatic  ENMT: No nasal discharge; airway clear  Neck: Supple; non tender; no masses  Respiratory: Good air entry. No wheezes, rales or rhonchi  Cardiovascular: Regular rate and rhythm. S1 and S2 Normal; No murmurs  Gastrointestinal: Soft non-tender non-distended; Normal bowel sounds  Genitourinary: No costovertebral angle tenderness  Extremities: decreased range of motion LLE due to pain,   no edema  Vascular: Peripheral pulses palpable 2+ bilaterally  Neurological: Alert and oriented x4, non focal   Skin: Warm and dry. No acute rash  Lymph Nodes: No acute cervical adenopathy  Musculoskeletal: Normal muscle tone, without deformities. BAck dressing D/C/I  Psychiatric: Cooperative and appropriate        LAbs;                          13.2   13.35 )-----------( 258      ( 30 Jan 2019 06:10 )             38.6     01-30    137  |  100  |  16  ----------------------------<  192<H>  4.2   |  30  |  0.45<L>    Ca    8.9      30 Jan 2019 06:10                          13.1   10.33 )-----------( 245      ( 29 Jan 2019 06:45 )             38.3     01-29    134<L>  |  98  |  21  ----------------------------<  252<H>  4.2   |  30  |  0.51    Ca    8.9      29 Jan 2019 06:45        PT/INR - ( 29 Jan 2019 06:45 )   PT: 11.4 sec;   INR: 1.03 ratio    PTT - ( 29 Jan 2019 06:45 )  PTT:24.9 sec                            12.1   8.47  )-----------( 247      ( 28 Jan 2019 07:36 )             35.4     01-28    135  |  99  |  25<H>  ----------------------------<  265<H>  4.2   |  29  |  0.60    Ca    8.6      28 Jan 2019 07:36                       13.5   8.39  )-----------( 240      ( 25 Jan 2019 15:44 )             39.4     25 Jan 2019 15:44    139    |  104    |  12     ----------------------------<  134    3.8     |  29     |  0.49     Ca    8.9        25 Jan 2019 15:44           Hemoglobin A1C, Whole Blood: 8.5 % (01-25-19 @ 20:24)    MEDICATIONS  (STANDING):  acetaminophen   Tablet .. 650 milliGRAM(s) Oral every 6 hours  amLODIPine   Tablet 5 milliGRAM(s) Oral daily  atorvastatin 20 milliGRAM(s) Oral at bedtime  dexamethasone     Tablet 2 milliGRAM(s) Oral every 24 hours  dextrose 5%. 1000 milliLiter(s) (50 mL/Hr) IV Continuous <Continuous>  dextrose 50% Injectable 12.5 Gram(s) IV Push once  dextrose 50% Injectable 25 Gram(s) IV Push once  dextrose 50% Injectable 25 Gram(s) IV Push once  docusate sodium 100 milliGRAM(s) Oral three times a day  gabapentin 200 milliGRAM(s) Oral every 8 hours  insulin glargine Injectable (LANTUS) 22 Unit(s) SubCutaneous at bedtime  insulin lispro (HumaLOG) corrective regimen sliding scale   SubCutaneous three times a day before meals  insulin lispro (HumaLOG) corrective regimen sliding scale   SubCutaneous at bedtime  insulin lispro Injectable (HumaLOG) 6 Unit(s) SubCutaneous three times a day before meals  lidocaine   Patch 2 Patch Transdermal daily  methocarbamol 750 milliGRAM(s) Oral every 8 hours  metoprolol succinate ER 50 milliGRAM(s) Oral daily  pantoprazole    Tablet 40 milliGRAM(s) Oral two times a day  senna 2 Tablet(s) Oral at bedtime  sodium chloride 0.9%. 1000 milliLiter(s) (75 mL/Hr) IV Continuous <Continuous>    MEDICATIONS  (PRN):  bisacodyl Suppository 10 milliGRAM(s) Rectal daily PRN Constipation  dextrose 40% Gel 15 Gram(s) Oral once PRN Blood Glucose LESS THAN 70 milliGRAM(s)/deciliter  glucagon  Injectable 1 milliGRAM(s) IntraMuscular once PRN Glucose LESS THAN 70 milligrams/deciliter  HYDROmorphone  Injectable 2 milliGRAM(s) IV Push every 4 hours PRN Severe Pain (7 - 10)  ondansetron Injectable 8 milliGRAM(s) IV Push every 6 hours PRN Nausea and/or Vomiting  oxyCODONE    IR 5 milliGRAM(s) Oral every 4 hours PRN Mild Pain (1 - 3)  oxyCODONE    IR 10 milliGRAM(s) Oral every 4 hours PRN Moderate Pain (4 - 6)  polyethylene glycol 3350 17 Gram(s) Oral daily PRN Constipation        RADIOLOGY & ADDITIONAL TESTS:    EXAM:  CT LUMBAR SPINE                        PROCEDURE DATE:  01/25/2019          INTERPRETATION:    CT lumbar without contrast    CLINICAL INFORMATION:       Lumbar spondylosis.    TECHNIQUE: CT of the lumbar spine was performed with axial   reconstructions. 2-D sagittal and coronal reformatted images were also   obtained.    FINDINGS:   No prior similar studies are available for review.         Lumbar vertebral alignment is significant for grade 1   anterospondylolisthesis at L4-5 on a degenerative basis.  Lumbar   vertebral body heights are maintained. No destructive lesions are noted.   There is calcification of the anterior longitudinal ligaments in the   lower thoracic upper lumbar spine.    Lumbar intervertebral discs show disc degeneration and spondylosis at   T12-L1 through L5-S1 with loss of disc height and associated degenerative   endplate changes. Disc bulges are noted at these levels which flatten the   ventral thecal sac and narrow the BILATERAL neural foramina. Mildcentral   stenosis noted at L2-3 and severe central stenosis noted at L3-4 and L4-5   on a degenerative basis due to facet osteophyte disc bulge, facet   osteophytic hypertrophy and redundancy of ligamentum flavum. Marked facet   osteophytic hypertrophy is noted at L5-S1.    The paraspinal soft tissues are intact.    IMPRESSION:  Grade 1 anterospondylolisthesis at L4-5 on a degenerative   basis.  Disc degeneration and spondylosis at T12-L1 through L5-S1 with   loss of disc height and associated degenerative endplate changes. Disc   bulges are noted at these levels which flatten the ventral thecal sac and   narrow the BILATERAL neural foramina. Mild central stenosis noted at L2-3   and severe central stenosis noted at L3-4 and L4-5 on a degenerative   basis due to facet osteophyte disc bulge, facet osteophytic hypertrophy   and redundancy of ligamentum flavum. Marked facet osteophytic hypertrophy   is noted at L5-S1.          EXAM:  XR CHEST PORTABLE URGENT 1V                        PROCEDURE DATE:  01/25/2019      Findings:  The heart is mildly prominent.  The lungs are grossly clear. The apices   and hemidiaphragms are unremarkable. Degenerative changes of the   visualized osseous structures.        Impression:  No acute disease  No significant interval change as compared to  1/29/2017

## 2019-01-30 NOTE — PHYSICAL THERAPY INITIAL EVALUATION ADULT - DIAGNOSIS, PT EVAL
s/p LS decompression L4-5, Severe stenosis, ligamentous hypertrophy
lumbar spinal stenosis; lumbar radiculopathy with LLE weakness

## 2019-01-30 NOTE — PHYSICAL THERAPY INITIAL EVALUATION ADULT - MANUAL MUSCLE TESTING RESULTS, REHAB EVAL
except B LE-pain/no strength deficits were identified
except L DF 2-/5/no strength deficits were identified

## 2019-01-30 NOTE — PHYSICAL THERAPY INITIAL EVALUATION ADULT - GAIT DISTANCE, PT EVAL
c/o nausea returned to sitting/5 feet
x2, chair to follow.  Patient c/o L knee "feels like it will buckle"  Not willing to ambulate further.  No buckling noted, but patient resistant to WB well on L LE./5 feet

## 2019-01-30 NOTE — PROGRESS NOTE ADULT - SUBJECTIVE AND OBJECTIVE BOX
HPI:  64 yo female previously seen by Dr Salcedo in office (11/2018) with severe lower back. MRI at that time sig for severe spinal stenosis at L 4-5 secondary to a grade I spondylolistheses, moderate stenosis at L2-3, L3-4. Flexion/ extension xrays demonstrates stability. Patient tried to get epidural injection at two facilities but her insurance denied the EDSIs per the patient. Today the pain was so severe that she could no longer walk or bear weight on her left leg due to the pain. She tried Meloxicam with no relief. Patient denies any narcotics or medications. The pain is worse with ambulation and mildly improved with lying in bed. The back pain radiates down bilateral groin anterior thighs to knees. She denies any numbness to extremities. She denies any bowel or bladder incontinence, denies loss in sensation in pubic or rectal area.    1/26 - Pt seen and examined earlier today, in NAD. With use of  phone discussed possible surgical intervention which pt was agreeable to. She appears comfortable but admits to Left buttock / LE pain, no new complaints, no overnight events    1/27 Pt seen and examined, no acute events ON. Family at bedtime, per patient she states oral percocet lasts about 2-3 hours for her pain. She reports pain in her left buttock radiating to her L knee.     1/28- Pt seen and examined with Dr. SALCEDO, continued pain radiating down left leg,  phone was used- plan is to proceed with surgical intervention tomorrow    1/30- Patient seen and examined with Dr. Salcedo. POD#1 s/p L4-5 laminectomy decompression. Interviewed with , she continues to report pain radiating down L thigh. Relieved with pain meds. She reports some mild improvement compared to preop.     Vital Signs Last 24 Hrs  T(C): 36.9 (30 Jan 2019 11:13), Max: 37.2 (29 Jan 2019 20:00)  T(F): 98.5 (30 Jan 2019 11:13), Max: 98.9 (29 Jan 2019 20:00)  HR: 63 (30 Jan 2019 11:13) (52 - 69)  BP: 140/53 (30 Jan 2019 11:13) (128/56 - 149/58)  BP(mean): --  RR: 16 (30 Jan 2019 11:13) (10 - 16)  SpO2: 97% (30 Jan 2019 11:13) (92% - 100%)    MEDICATIONS  (STANDING):  acetaminophen   Tablet .. 650 milliGRAM(s) Oral every 6 hours  amLODIPine   Tablet 5 milliGRAM(s) Oral daily  atorvastatin 20 milliGRAM(s) Oral at bedtime  dexamethasone     Tablet 2 milliGRAM(s) Oral every 24 hours  dextrose 5%. 1000 milliLiter(s) (50 mL/Hr) IV Continuous <Continuous>  dextrose 50% Injectable 12.5 Gram(s) IV Push once  dextrose 50% Injectable 25 Gram(s) IV Push once  dextrose 50% Injectable 25 Gram(s) IV Push once  docusate sodium 100 milliGRAM(s) Oral three times a day  gabapentin 200 milliGRAM(s) Oral every 8 hours  insulin glargine Injectable (LANTUS) 22 Unit(s) SubCutaneous at bedtime  insulin lispro (HumaLOG) corrective regimen sliding scale   SubCutaneous three times a day before meals  insulin lispro (HumaLOG) corrective regimen sliding scale   SubCutaneous at bedtime  insulin lispro Injectable (HumaLOG) 6 Unit(s) SubCutaneous three times a day before meals  lidocaine   Patch 2 Patch Transdermal daily  methocarbamol 750 milliGRAM(s) Oral every 8 hours  metoprolol succinate ER 50 milliGRAM(s) Oral daily  pantoprazole    Tablet 40 milliGRAM(s) Oral two times a day  senna 2 Tablet(s) Oral at bedtime  sodium chloride 0.9%. 1000 milliLiter(s) (75 mL/Hr) IV Continuous <Continuous>    MEDICATIONS  (PRN):  bisacodyl Suppository 10 milliGRAM(s) Rectal daily PRN Constipation  dextrose 40% Gel 15 Gram(s) Oral once PRN Blood Glucose LESS THAN 70 milliGRAM(s)/deciliter  glucagon  Injectable 1 milliGRAM(s) IntraMuscular once PRN Glucose LESS THAN 70 milligrams/deciliter  HYDROmorphone  Injectable 2 milliGRAM(s) IV Push every 4 hours PRN Severe Pain (7 - 10)  ondansetron Injectable 8 milliGRAM(s) IV Push every 6 hours PRN Nausea and/or Vomiting  oxyCODONE    IR 5 milliGRAM(s) Oral every 4 hours PRN Mild Pain (1 - 3)  oxyCODONE    IR 10 milliGRAM(s) Oral every 4 hours PRN Moderate Pain (4 - 6)  polyethylene glycol 3350 17 Gram(s) Oral daily PRN Constipation      PHYSICAL EXAM:  Constitutional: Awake, alert sitting up in chair  HEENT: PERRLA, EOMI  Neck: Supple  Respiratory: Breath sounds are clear bilaterally  Cardiovascular: S1 and S2, regular rhythm  Gastrointestinal: Obese, soft, NT/ND  Extremities:  no edema  Musculoskeletal: no joint swelling/tenderness, no abnormal movements  Skin: Dressing c/d/i    Neurological Exam:  HF: A x O x 3, appropriately interactive, normal affect, speech fluent  CN: PERRL, EOMI, facial sensation normal, no NLFD, tongue midline  Motor: No pronator drift, Strength 5/5 in all 4 ext except Left DF/EHL 4/5, L IP 4-/5, L QD 4/5 limited 2/2 pain, normal bulk and tone, no tremor, rigidity or bradykinesia, neg clonus  Sens: Dec sens Left later thigh, otherwise intact to light touch  Gait/Balance: Cannot test          LABS:                         13.2   13.35 )-----------( 258      ( 30 Jan 2019 06:10 )             38.6     01-30    137  |  100  |  16  ----------------------------<  192<H>  4.2   |  30  |  0.45<L>    Ca    8.9      30 Jan 2019 06:10          01-25 [id number] 8.5

## 2019-01-30 NOTE — PROGRESS NOTE ADULT - ASSESSMENT
62 yo female previously seen by Dr Shoemaker in office (11/2018) with severe lower back. MRI at that time sig for severe spinal stenosis at L 4-5 secondary to a grade I spondylolistheses, moderate stenosis at L2-3, L3-4. Flexion/ extension xrays demonstrates stability.     POD#1 s/p L4-5 laminectomy    Plan:  - Patient stable from a neurosurgical perspective for discharge  - Follow up with Dr. Shoemaker in 2 weeks upon discharge  - Ok to remove dressing and shower POD#2 starting tomorrow 1/31  - PT rec [facility]  - Spoke with SW regarding placement, currently in progress  - OOB with assistance  - SCDs for DVT proph. Ok for chemoprophylaxis on POD#2 starting tomorrow 1/31  - Bowel regimen for GI proph    D/w Dr. Shoemaker

## 2019-01-30 NOTE — PHYSICAL THERAPY INITIAL EVALUATION ADULT - PERTINENT HX OF CURRENT PROBLEM, REHAB EVAL
Pt is a s/p LS decompression POD 1, Presented to ED with chronic back pain, c/o  worsening  left  leg pain with difficulty ambulating  x4 days.  She c/o  leg pain associated w/ warmth, numbness, tingling

## 2019-01-30 NOTE — PROGRESS NOTE ADULT - ASSESSMENT
Pt is a 62y/o  woman w/ PMHx HTN,  DM II, HLD with chronic back due to severe Spinal stenosis  admitted for:       I.  Acute on chronic lower back pain, sciatica, Inability to ambulate  - CT reviewed.   - Neuro Sx f/u appreciated,  plan for OR TODAY   - On  decadron, with no significant improvement   - C/w pain meds and muscle relaxants  - c/w  Neurontin    - PT Postop   - Need to clarify with Sx when to start SQ heparin for DVT PPxs, c/w venodynes         II. DM   -HB A1c 8.5   - Hold oral hypoglycemics   - C/w Lantus, and ISS       III. HTN  - cont meds: on amlodipine and Metoprolol     IV. HLD  - c/w Lipitor     V. B/l calf tenderness  - c Dopplers: neg for  DVT     VI. GERD  Protonix increase to BID     VII. Mild leukocytosis, reactive   Monitor for fevers        Dispo: D/c planning to [facility] when cleared by Sx and rehab arranged

## 2019-01-31 ENCOUNTER — TRANSCRIPTION ENCOUNTER (OUTPATIENT)
Age: 64
End: 2019-01-31

## 2019-01-31 VITALS
SYSTOLIC BLOOD PRESSURE: 157 MMHG | HEART RATE: 66 BPM | RESPIRATION RATE: 16 BRPM | TEMPERATURE: 97 F | OXYGEN SATURATION: 98 % | DIASTOLIC BLOOD PRESSURE: 77 MMHG

## 2019-01-31 LAB
ANION GAP SERPL CALC-SCNC: 5 MMOL/L — SIGNIFICANT CHANGE UP (ref 5–17)
BASOPHILS # BLD AUTO: 0.02 K/UL — SIGNIFICANT CHANGE UP (ref 0–0.2)
BASOPHILS NFR BLD AUTO: 0.2 % — SIGNIFICANT CHANGE UP (ref 0–2)
BUN SERPL-MCNC: 21 MG/DL — SIGNIFICANT CHANGE UP (ref 7–23)
CALCIUM SERPL-MCNC: 8.8 MG/DL — SIGNIFICANT CHANGE UP (ref 8.5–10.1)
CHLORIDE SERPL-SCNC: 99 MMOL/L — SIGNIFICANT CHANGE UP (ref 96–108)
CO2 SERPL-SCNC: 32 MMOL/L — HIGH (ref 22–31)
CREAT SERPL-MCNC: 0.45 MG/DL — LOW (ref 0.5–1.3)
EOSINOPHIL # BLD AUTO: 0.17 K/UL — SIGNIFICANT CHANGE UP (ref 0–0.5)
EOSINOPHIL NFR BLD AUTO: 1.6 % — SIGNIFICANT CHANGE UP (ref 0–6)
GLUCOSE BLDC GLUCOMTR-MCNC: 192 MG/DL — HIGH (ref 70–99)
GLUCOSE BLDC GLUCOMTR-MCNC: 193 MG/DL — HIGH (ref 70–99)
GLUCOSE BLDC GLUCOMTR-MCNC: 217 MG/DL — HIGH (ref 70–99)
GLUCOSE BLDC GLUCOMTR-MCNC: 319 MG/DL — HIGH (ref 70–99)
GLUCOSE SERPL-MCNC: 215 MG/DL — HIGH (ref 70–99)
HCT VFR BLD CALC: 39.7 % — SIGNIFICANT CHANGE UP (ref 34.5–45)
HGB BLD-MCNC: 13.5 G/DL — SIGNIFICANT CHANGE UP (ref 11.5–15.5)
IMM GRANULOCYTES NFR BLD AUTO: 0.6 % — SIGNIFICANT CHANGE UP (ref 0–1.5)
LYMPHOCYTES # BLD AUTO: 3.21 K/UL — SIGNIFICANT CHANGE UP (ref 1–3.3)
LYMPHOCYTES # BLD AUTO: 30.8 % — SIGNIFICANT CHANGE UP (ref 13–44)
MCHC RBC-ENTMCNC: 30.1 PG — SIGNIFICANT CHANGE UP (ref 27–34)
MCHC RBC-ENTMCNC: 34 GM/DL — SIGNIFICANT CHANGE UP (ref 32–36)
MCV RBC AUTO: 88.4 FL — SIGNIFICANT CHANGE UP (ref 80–100)
MONOCYTES # BLD AUTO: 0.78 K/UL — SIGNIFICANT CHANGE UP (ref 0–0.9)
MONOCYTES NFR BLD AUTO: 7.5 % — SIGNIFICANT CHANGE UP (ref 2–14)
NEUTROPHILS # BLD AUTO: 6.18 K/UL — SIGNIFICANT CHANGE UP (ref 1.8–7.4)
NEUTROPHILS NFR BLD AUTO: 59.3 % — SIGNIFICANT CHANGE UP (ref 43–77)
NRBC # BLD: 0 /100 WBCS — SIGNIFICANT CHANGE UP (ref 0–0)
PLATELET # BLD AUTO: 243 K/UL — SIGNIFICANT CHANGE UP (ref 150–400)
POTASSIUM SERPL-MCNC: 4.1 MMOL/L — SIGNIFICANT CHANGE UP (ref 3.5–5.3)
POTASSIUM SERPL-SCNC: 4.1 MMOL/L — SIGNIFICANT CHANGE UP (ref 3.5–5.3)
RBC # BLD: 4.49 M/UL — SIGNIFICANT CHANGE UP (ref 3.8–5.2)
RBC # FLD: 12.5 % — SIGNIFICANT CHANGE UP (ref 10.3–14.5)
SODIUM SERPL-SCNC: 136 MMOL/L — SIGNIFICANT CHANGE UP (ref 135–145)
WBC # BLD: 10.42 K/UL — SIGNIFICANT CHANGE UP (ref 3.8–10.5)
WBC # FLD AUTO: 10.42 K/UL — SIGNIFICANT CHANGE UP (ref 3.8–10.5)

## 2019-01-31 RX ORDER — SENNA PLUS 8.6 MG/1
2 TABLET ORAL
Qty: 0 | Refills: 0 | DISCHARGE
Start: 2019-01-31

## 2019-01-31 RX ORDER — OXYCODONE HYDROCHLORIDE 5 MG/1
1 TABLET ORAL
Qty: 0 | Refills: 0 | DISCHARGE
Start: 2019-01-31

## 2019-01-31 RX ORDER — ACETAMINOPHEN 500 MG
2 TABLET ORAL
Qty: 0 | Refills: 0 | DISCHARGE
Start: 2019-01-31

## 2019-01-31 RX ORDER — GABAPENTIN 400 MG/1
2 CAPSULE ORAL
Qty: 0 | Refills: 0 | DISCHARGE
Start: 2019-01-31

## 2019-01-31 RX ORDER — POLYETHYLENE GLYCOL 3350 17 G/17G
17 POWDER, FOR SOLUTION ORAL
Qty: 0 | Refills: 0 | DISCHARGE
Start: 2019-01-31

## 2019-01-31 RX ORDER — DOCUSATE SODIUM 100 MG
1 CAPSULE ORAL
Qty: 0 | Refills: 0 | DISCHARGE
Start: 2019-01-31

## 2019-01-31 RX ORDER — PANTOPRAZOLE SODIUM 20 MG/1
1 TABLET, DELAYED RELEASE ORAL
Qty: 0 | Refills: 0 | DISCHARGE
Start: 2019-01-31

## 2019-01-31 RX ADMIN — PANTOPRAZOLE SODIUM 40 MILLIGRAM(S): 20 TABLET, DELAYED RELEASE ORAL at 06:31

## 2019-01-31 RX ADMIN — ONDANSETRON 8 MILLIGRAM(S): 8 TABLET, FILM COATED ORAL at 03:23

## 2019-01-31 RX ADMIN — Medication 2: at 08:48

## 2019-01-31 RX ADMIN — HYDROMORPHONE HYDROCHLORIDE 2 MILLIGRAM(S): 2 INJECTION INTRAMUSCULAR; INTRAVENOUS; SUBCUTANEOUS at 02:03

## 2019-01-31 RX ADMIN — Medication 650 MILLIGRAM(S): at 06:31

## 2019-01-31 RX ADMIN — Medication 6 UNIT(S): at 17:20

## 2019-01-31 RX ADMIN — Medication 650 MILLIGRAM(S): at 17:12

## 2019-01-31 RX ADMIN — OXYCODONE HYDROCHLORIDE 10 MILLIGRAM(S): 5 TABLET ORAL at 12:22

## 2019-01-31 RX ADMIN — PANTOPRAZOLE SODIUM 40 MILLIGRAM(S): 20 TABLET, DELAYED RELEASE ORAL at 17:13

## 2019-01-31 RX ADMIN — OXYCODONE HYDROCHLORIDE 10 MILLIGRAM(S): 5 TABLET ORAL at 13:18

## 2019-01-31 RX ADMIN — GABAPENTIN 200 MILLIGRAM(S): 400 CAPSULE ORAL at 06:31

## 2019-01-31 RX ADMIN — OXYCODONE HYDROCHLORIDE 10 MILLIGRAM(S): 5 TABLET ORAL at 00:38

## 2019-01-31 RX ADMIN — Medication 650 MILLIGRAM(S): at 12:23

## 2019-01-31 RX ADMIN — Medication 100 MILLIGRAM(S): at 06:31

## 2019-01-31 RX ADMIN — Medication 650 MILLIGRAM(S): at 12:20

## 2019-01-31 RX ADMIN — Medication 650 MILLIGRAM(S): at 07:11

## 2019-01-31 RX ADMIN — METHOCARBAMOL 750 MILLIGRAM(S): 500 TABLET, FILM COATED ORAL at 06:32

## 2019-01-31 RX ADMIN — Medication 2 MILLIGRAM(S): at 06:32

## 2019-01-31 RX ADMIN — AMLODIPINE BESYLATE 5 MILLIGRAM(S): 2.5 TABLET ORAL at 07:11

## 2019-01-31 RX ADMIN — OXYCODONE HYDROCHLORIDE 10 MILLIGRAM(S): 5 TABLET ORAL at 04:58

## 2019-01-31 RX ADMIN — Medication 8: at 12:21

## 2019-01-31 RX ADMIN — OXYCODONE HYDROCHLORIDE 10 MILLIGRAM(S): 5 TABLET ORAL at 17:13

## 2019-01-31 RX ADMIN — HYDROMORPHONE HYDROCHLORIDE 2 MILLIGRAM(S): 2 INJECTION INTRAMUSCULAR; INTRAVENOUS; SUBCUTANEOUS at 01:33

## 2019-01-31 RX ADMIN — Medication 6 UNIT(S): at 12:21

## 2019-01-31 RX ADMIN — OXYCODONE HYDROCHLORIDE 10 MILLIGRAM(S): 5 TABLET ORAL at 00:08

## 2019-01-31 RX ADMIN — Medication 100 MILLIGRAM(S): at 13:35

## 2019-01-31 RX ADMIN — OXYCODONE HYDROCHLORIDE 10 MILLIGRAM(S): 5 TABLET ORAL at 04:28

## 2019-01-31 RX ADMIN — Medication 6 UNIT(S): at 08:48

## 2019-01-31 RX ADMIN — METHOCARBAMOL 750 MILLIGRAM(S): 500 TABLET, FILM COATED ORAL at 13:34

## 2019-01-31 RX ADMIN — POLYETHYLENE GLYCOL 3350 17 GRAM(S): 17 POWDER, FOR SOLUTION ORAL at 16:09

## 2019-01-31 RX ADMIN — GABAPENTIN 200 MILLIGRAM(S): 400 CAPSULE ORAL at 13:35

## 2019-01-31 RX ADMIN — Medication 2: at 17:19

## 2019-01-31 RX ADMIN — Medication 50 MILLIGRAM(S): at 07:11

## 2019-01-31 NOTE — PROGRESS NOTE ADULT - ASSESSMENT
64 yo female previously seen by Dr Shoemaker in office (11/2018) with severe lower back. MRI at that time sig for severe spinal stenosis at L 4-5 secondary to a grade I spondylolistheses, moderate stenosis at L2-3, L3-4. Flexion/ extension xrays demonstrates stability.     POD#2 s/p L4-5 laminectomy    Plan:  - Patient stable from a neurosurgical perspective for discharge  - Follow up with Dr. Shoemaker in 2 weeks upon discharge  - PT rec [facility]  - Spoke with SW regarding placement, currently in progress  - OOB with assistance  - SCDs for DVT proph-- can start SQ heparin   - Bowel regimen for GI proph    D/w Dr. Shoemaker

## 2019-01-31 NOTE — DISCHARGE NOTE ADULT - PATIENT PORTAL LINK FT
You can access the [facility], offered by [facility], by registering with the following website: [email or web address]

## 2019-01-31 NOTE — PROGRESS NOTE ADULT - SUBJECTIVE AND OBJECTIVE BOX
HPI:  62 yo female previously seen by Dr Salcedo in office (11/2018) with severe lower back. MRI at that time sig for severe spinal stenosis at L 4-5 secondary to a grade I spondylolistheses, moderate stenosis at L2-3, L3-4. Flexion/ extension xrays demonstrates stability. Patient tried to get epidural injection at two facilities but her insurance denied the EDSIs per the patient. Today the pain was so severe that she could no longer walk or bear weight on her left leg due to the pain. She tried Meloxicam with no relief. Patient denies any narcotics or medications. The pain is worse with ambulation and mildly improved with lying in bed. The back pain radiates down bilateral groin anterior thighs to knees. She denies any numbness to extremities. She denies any bowel or bladder incontinence, denies loss in sensation in pubic or rectal area.    1/26 - Pt seen and examined earlier today, in NAD. With use of  phone discussed possible surgical intervention which pt was agreeable to. She appears comfortable but admits to Left buttock / LE pain, no new complaints, no overnight events    1/27 Pt seen and examined, no acute events ON. Family at bedtime, per patient she states oral percocet lasts about 2-3 hours for her pain. She reports pain in her left buttock radiating to her L knee.     1/28- Pt seen and examined with Dr. SALCEDO, continued pain radiating down left leg,  phone was used- plan is to proceed with surgical intervention tomorrow    1/30- Patient seen and examined with Dr. Salcedo. POD#1 s/p L4-5 laminectomy decompression. Interviewed with , she continues to report pain radiating down L thigh. Relieved with pain meds. She reports some mild improvement compared to preop.     1/31- POD #2- Pt seen and examined, continued left leg pain, denies back pain or pain at incision site, tolerating PO, afebrile, able to work with PT     Vital Signs Last 24 Hrs  T(C): 36.8 (31 Jan 2019 10:56), Max: 37.1 (30 Jan 2019 17:08)  T(F): 98.3 (31 Jan 2019 10:56), Max: 98.7 (30 Jan 2019 17:08)  HR: 66 (31 Jan 2019 10:56) (55 - 89)  BP: 145/68 (31 Jan 2019 10:56) (134/58 - 157/68)  RR: 16 (31 Jan 2019 10:56) (16 - 16)  SpO2: 97% (31 Jan 2019 10:56) (94% - 97%)    MEDICATIONS  (STANDING):  acetaminophen   Tablet 650 milliGRAM(s) Oral every 6 hours  amLODIPine Tablet 5 milliGRAM(s) Oral daily  atorvastatin 20 milliGRAM(s) Oral at bedtime  dexamethasone  Tablet 2 milliGRAM(s) Oral every 24 hours  docusate sodium 100 milliGRAM(s) Oral three times a day  gabapentin 200 milliGRAM(s) Oral every 8 hours  insulin glargine Injectable (LANTUS) 22 Unit(s) SubCutaneous at bedtime  insulin lispro (HumaLOG) corrective regimen sliding scale   SubCutaneous three times a day before meals  insulin lispro (HumaLOG) corrective regimen sliding scale   SubCutaneous at bedtime  insulin lispro Injectable (HumaLOG) 6 Unit(s) SubCutaneous three times a day before meals  lidocaine   Patch 2 Patch Transdermal daily  metoprolol succinate ER 50 milliGRAM(s) Oral daily  pantoprazole    Tablet 40 milliGRAM(s) Oral two times a day  senna 2 Tablet(s) Oral at bedtime  sodium chloride 0.9%. 1000 milliLiter(s) (75 mL/Hr) IV Continuous <Continuous>    MEDICATIONS  (PRN):  bisacodyl Suppository 10 milliGRAM(s) Rectal daily PRN Constipation  dextrose 40% Gel 15 Gram(s) Oral once PRN Blood Glucose LESS THAN 70 milliGRAM(s)/deciliter  glucagon  Injectable 1 milliGRAM(s) IntraMuscular once PRN Glucose LESS THAN 70 milligrams/deciliter  HYDROmorphone  Injectable 2 milliGRAM(s) IV Push every 4 hours PRN Severe Pain (7 - 10)  ondansetron Injectable 8 milliGRAM(s) IV Push every 6 hours PRN Nausea and/or Vomiting  oxyCODONE    IR 5 milliGRAM(s) Oral every 4 hours PRN Mild Pain (1 - 3)  oxyCODONE    IR 10 milliGRAM(s) Oral every 4 hours PRN Moderate Pain (4 - 6)  polyethylene glycol 3350 17 Gram(s) Oral daily PRN Constipation    PHYSICAL EXAM:  Constitutional: Awake, alert sitting up in chair  HEENT: PERRLA, EOMI  Neck: Supple  Respiratory: Breath sounds are clear bilaterally  Cardiovascular: S1 and S2, regular rhythm  Gastrointestinal: Obese, soft, NT/ND  Extremities:  no edema  Musculoskeletal: no joint swelling/tenderness, no abnormal movements  Skin: Dressing c/d/i    Neurological Exam:  HF: AxO x 3, appropriately interactive, normal affect, speech fluent  CN: PEARRL, EOMI, facial sensation normal, no NLFD, tongue midline  Motor: No pronator drift, Strength 5/5 in all 4 ext except Left DF/EHL 4/5, L IP 4-/5, L QD 4/5 limited 2/2 pain, normal bulk and tone, no tremor, rigidity or bradykinesia, neg clonus  Sens: Dec sens Left later thigh, otherwise intact to light touch  Gait/Balance: Cannot test    LABS:                         13.5   10.42 )-----------( 243      ( 31 Jan 2019 06:13 )             39.7     01-31    136  |  99  |  21  ----------------------------<  215<H>  4.1   |  32<H>  |  0.45<L>    Ca    8.8      31 Jan 2019 06:13    01-25 [id number] 8.5

## 2019-01-31 NOTE — DISCHARGE NOTE ADULT - CARE PLAN
Principal Discharge DX:	Spinal stenosis of lumbar region with neurogenic claudication  Goal:	improve  Assessment and plan of treatment:	c/w pain meds  PT

## 2019-01-31 NOTE — DISCHARGE NOTE ADULT - NS TRANSFER PATIENT BELONGINGS
Jewelry/Money (specify)/Cell Phone/PDA (specify)/Other belongings/hoop earrings in her ears, cell , jacket/Clothing

## 2019-01-31 NOTE — DISCHARGE NOTE ADULT - MEDICATION SUMMARY - MEDICATIONS TO TAKE
I will START or STAY ON the medications listed below when I get home from the hospital:    acetaminophen 325 mg oral tablet  -- 2 tab(s) by mouth every 6 hours x 2 days   -- Indication: For pain    oxyCODONE 10 mg oral tablet  -- 1 tab(s) by mouth every 4 hours, As needed, Moderate Pain (4 - 6)  -- Indication: For pain    oxyCODONE 5 mg oral tablet  -- 1 tab(s) by mouth every 4 hours, As needed, Mild Pain (1 - 3)  -- Indication: For pain    gabapentin 100 mg oral capsule  -- 2 cap(s) by mouth every 8 hours  -- Indication: For neuropathic pain     metFORMIN 1000 mg oral tablet  -- 1 tab(s) by mouth 2 times a day   -- Check with your doctor before becoming pregnant.  Do not drink alcoholic beverages when taking this medication.  It is very important that you take or use this exactly as directed.  Do not skip doses or discontinue unless directed by your doctor.  Obtain medical advice before taking any non-prescription drugs as some may affect the action of this medication.  Take with food or milk.    -- Indication: For DM    Basaglar KwikPen 100 units/mL subcutaneous solution  -- 10 unit(s) subcutaneous once a day (at bedtime)  -- Indication: For DM    Januvia 100 mg oral tablet  -- 1 tab(s) by mouth once a day  -- Indication: For DM    atorvastatin 20 mg oral tablet  -- 1 tab(s) by mouth once a day   -- Avoid grapefruit and grapefruit juice while taking this medication.  Do not take this drug if you are pregnant.  It is very important that you take or use this exactly as directed.  Do not skip doses or discontinue unless directed by your doctor.  Obtain medical advice before taking any non-prescription drugs as some may affect the action of this medication.  Take with food or milk.    -- Indication: For HLD    Metoprolol Succinate ER 50 mg oral tablet, extended release  -- 1 tab(s) by mouth once a day   -- It is very important that you take or use this exactly as directed.  Do not skip doses or discontinue unless directed by your doctor.  May cause drowsiness.  Alcohol may intensify this effect.  Use care when operating dangerous machinery.  Some non-prescription drugs may aggravate your condition.  Read all labels carefully.  If a warning appears, check with your doctor before taking.  Swallow whole.  Do not crush.  Take with food or milk.  This drug may impair the ability to drive or operate machinery.  Use care until you become familiar with its effects.    -- Indication: For HTN    amLODIPine 5 mg oral tablet  -- 1 tab(s) by mouth once a day   -- It is very important that you take or use this exactly as directed.  Do not skip doses or discontinue unless directed by your doctor.  Some non-prescription drugs may aggravate your condition.  Read all labels carefully.  If a warning appears, check with your doctor before taking.    -- Indication: For HTN    bisacodyl 10 mg rectal suppository  -- 1 suppository(ies) rectally once a day, As needed, Constipation  -- Indication: For constipation    docusate sodium 100 mg oral capsule  -- 1 cap(s) by mouth 3 times a day  -- Indication: For constipation    polyethylene glycol 3350 oral powder for reconstitution  -- 17 gram(s) by mouth once a day, As needed, Constipation  -- Indication: For constipation    senna oral tablet  -- 2 tab(s) by mouth once a day (at bedtime)  -- Indication: For constipation    pantoprazole 40 mg oral delayed release tablet  -- 1 tab(s) by mouth once a day  -- Indication: For GERD

## 2019-01-31 NOTE — DISCHARGE NOTE ADULT - CARE PROVIDER_API CALL
Sawyer Shoemaker; PhD)  Neurosurgery  [address] [facility], 2nd [place] [address]  Phone: [phone]  Fax: [phone]

## 2019-01-31 NOTE — PROGRESS NOTE ADULT - REASON FOR ADMISSION
difficulty ambulating

## 2019-02-04 ENCOUNTER — INBOUND DOCUMENT (OUTPATIENT)
Age: 64
End: 2019-02-04

## 2019-02-06 DIAGNOSIS — M48.062 SPINAL STENOSIS, LUMBAR REGION WITH NEUROGENIC CLAUDICATION: ICD-10-CM

## 2019-02-06 DIAGNOSIS — E78.5 HYPERLIPIDEMIA, UNSPECIFIED: ICD-10-CM

## 2019-02-06 DIAGNOSIS — K21.9 GASTRO-ESOPHAGEAL REFLUX DISEASE WITHOUT ESOPHAGITIS: ICD-10-CM

## 2019-02-06 DIAGNOSIS — M54.16 RADICULOPATHY, LUMBAR REGION: ICD-10-CM

## 2019-02-06 DIAGNOSIS — G89.29 OTHER CHRONIC PAIN: ICD-10-CM

## 2019-02-06 DIAGNOSIS — M43.16 SPONDYLOLISTHESIS, LUMBAR REGION: ICD-10-CM

## 2019-02-06 DIAGNOSIS — E11.9 TYPE 2 DIABETES MELLITUS WITHOUT COMPLICATIONS: ICD-10-CM

## 2019-02-06 DIAGNOSIS — M79.661 PAIN IN RIGHT LOWER LEG: ICD-10-CM

## 2019-02-06 DIAGNOSIS — K59.04 CHRONIC IDIOPATHIC CONSTIPATION: ICD-10-CM

## 2019-02-06 DIAGNOSIS — K64.9 UNSPECIFIED HEMORRHOIDS: ICD-10-CM

## 2019-02-06 DIAGNOSIS — Z79.4 LONG TERM (CURRENT) USE OF INSULIN: ICD-10-CM

## 2019-02-06 DIAGNOSIS — I10 ESSENTIAL (PRIMARY) HYPERTENSION: ICD-10-CM

## 2019-02-06 DIAGNOSIS — D72.829 ELEVATED WHITE BLOOD CELL COUNT, UNSPECIFIED: ICD-10-CM

## 2019-02-26 ENCOUNTER — APPOINTMENT (OUTPATIENT)
Dept: NEUROSURGERY | Facility: CLINIC | Age: 64
End: 2019-02-26
Payer: COMMERCIAL

## 2019-03-04 ENCOUNTER — APPOINTMENT (OUTPATIENT)
Dept: NEUROSURGERY | Facility: CLINIC | Age: 64
End: 2019-03-04
Payer: COMMERCIAL

## 2019-03-04 VITALS
BODY MASS INDEX: 33.29 KG/M2 | DIASTOLIC BLOOD PRESSURE: 82 MMHG | RESPIRATION RATE: 16 BRPM | WEIGHT: 195 LBS | SYSTOLIC BLOOD PRESSURE: 147 MMHG | HEART RATE: 73 BPM | HEIGHT: 64 IN | TEMPERATURE: 98.5 F

## 2019-03-04 PROCEDURE — 99024 POSTOP FOLLOW-UP VISIT: CPT

## 2019-03-04 NOTE — CONSULT LETTER
[Dear  ___] : Dear  [unfilled], [Sincerely,] : Sincerely, [FreeTextEntry2] : Radha Davis MD\par [address]\[place] [address] \par  [FreeTextEntry1] : Ms. Tanisha Marin is a 63-year-old female who presents today for a postop evaluation. On January 29, 2019, patient underwent L4/5 lumbar decompression for lumbar stenosis with neurogenic claudication. Patient currently resides at [facility] nursing and rehabilitation. Patient states she is experiencing lower back pain. Patient states she has noted improvement in her lower back pain. Patient states her current regimen that she is currently receiving at the nursing home has not providing her with any relief. Patient is currently receiving oxycodone 5 mg q.6 p.r.n. and Tylenol. Patient also continues to experience numbness and tingling to her bilateral extremities however this  has slightly improved. Patient states she has noted slight improvement in her walking with physical therapy. However she is using a wheelchair. \par \par There is no imaging to review with the patient. \par \par Patient is alert and oriented. No distress noted. 5/5 strength noted to right leg. Left thigh 3/5 strength. 3/5 strength noted with dorsiflexion of left foot.  +1 bilaterally patellar reflexes noted. Unable to elicit bilateral Achilles tendon reflexes. Negative clouns. Decreased sensation to light touch noted the left leg. Incision is without any redness, drainage, or swelling. No fever or chills. There are no sutures or Steri-Strips to remove at this time.\par \par We will follow up in 4 week. At this time, no further imaging is needed. I have asked that the rehabilitation consider increasing patient oxycodone dose and add an agent such as gabapentin to help with her paresthesias. I recommended possibly a pain management referral. Patient should continue physical therapy in order to improve strength and walking. Patient is aware call with any further questions or concerns. \par  [FreeTextEntry3] : Macarena Clement, DIANA., FNP-BC\par Department of Neurosurgery\par [address] [place] [address]\[place] [address]\par Phone: [phone]\par Fax: [phone]\par

## 2019-04-08 ENCOUNTER — APPOINTMENT (OUTPATIENT)
Age: 64
End: 2019-04-08
Payer: COMMERCIAL

## 2019-04-08 VITALS
DIASTOLIC BLOOD PRESSURE: 83 MMHG | BODY MASS INDEX: 33.8 KG/M2 | TEMPERATURE: 98.2 F | WEIGHT: 198 LBS | HEIGHT: 64 IN | SYSTOLIC BLOOD PRESSURE: 150 MMHG | HEART RATE: 87 BPM | RESPIRATION RATE: 16 BRPM

## 2019-04-08 PROCEDURE — 99024 POSTOP FOLLOW-UP VISIT: CPT

## 2019-04-09 NOTE — REASON FOR VISIT
[de-identified] : L4/5 Bilateral decompression via a unilateral lamintomy approach left L4/5 medial facetectomy, operative mircroscope [de-identified] : 71 [de-identified] : 01/29/2019

## 2019-04-09 NOTE — CONSULT LETTER
[Dear  ___] : Dear  [unfilled], [Sincerely,] : Sincerely, [FreeTextEntry2] : Radha Davis MD\par [address]\[place] [address] [FreeTextEntry1] : Tanisha Marin is a 63-year-old female who presents today for postop evaluation. On January 29, 2019, patient underwent L 4/5 lumbar decompression for lumbar stenosis with neurogenic claudication. Patient continues to experience lower back pain with radiation to the right leg. She denies any electric/shooting pains into her legs. The pain that she was experiencing in the left leg has improved. Her lower back pain does worsen with walking. She is currently using a walker to get around. Patient reports numbness in the left thigh has improved and the numbness in the right leg and left calf has resolved. Patient states the only medication that helps with her lower back and leg pain is oxycodone. She is currently not receiving physical therapy.\par \par There is no imaging to review with the patient at this time.\par \par Patient is alert and oriented. No distress noted. Strength to bilateral legs equal and normal. Reflexes bilateral lower extremities symmetric and normal. Negative clonus. Sensation to light touch to bilateral legs symmetric and normal. Incision has healed very well. There is no redness drainage or swelling noted. No fever chills noted.\par \par Due the patient's persistent lower back pain with radiation to her right leg, I have asked that the patient undergo an MRI of the lumbar spine to evaluate for progression status post surgery. I have also provided the patient with a prescription for an ultrasound of the right leg due to her complaints of right calf pain. I have provided the patient with a prescription for oxycodone and tizanidine. I have reviewed side effects of this medication with the patient. I've also provided the patient with a prescription and recommendations for pain management doctors. Lastly, I have provided the patient with a prescription for physical therapy. We will follow up in the office once imaging is completed. I have instructed patient to call with any further questions or concerns are with any new or worsening symptoms.\par  [FreeTextEntry3] : Macarena Clement, DIANA., FNP-BC\par Department of Neurosurgery\par [address] [place] [address]\[place] [address]\par Phone: [phone]\par Fax: [phone]\par

## 2019-04-18 ENCOUNTER — APPOINTMENT (OUTPATIENT)
Dept: NEUROSURGERY | Facility: CLINIC | Age: 64
End: 2019-04-18
Payer: COMMERCIAL

## 2019-04-18 DIAGNOSIS — M79.604 PAIN IN RIGHT LEG: ICD-10-CM

## 2019-04-18 DIAGNOSIS — M43.17 SPONDYLOLISTHESIS, LUMBOSACRAL REGION: ICD-10-CM

## 2019-04-18 DIAGNOSIS — M54.16 RADICULOPATHY, LUMBAR REGION: ICD-10-CM

## 2019-04-18 DIAGNOSIS — M48.062 SPINAL STENOSIS, LUMBAR REGION WITH NEUROGENIC CLAUDICATION: ICD-10-CM

## 2019-04-18 PROCEDURE — 99024 POSTOP FOLLOW-UP VISIT: CPT

## 2019-04-18 NOTE — REVIEW OF SYSTEMS
[Feeling Poorly] : feeling poorly [Difficulty Walking] : difficulty walking [As Noted in HPI] : as noted in HPI [Negative] : Heme/Lymph

## 2019-04-23 PROBLEM — M79.604 PAIN OF RIGHT LOWER EXTREMITY: Status: RESOLVED | Noted: 2019-04-08 | Resolved: 2019-04-23

## 2019-04-23 PROBLEM — M43.17 ACQUIRED SPONDYLOLISTHESIS OF LUMBOSACRAL REGION: Status: ACTIVE | Noted: 2019-01-10

## 2019-04-23 PROBLEM — M48.062 LUMBAR STENOSIS WITH NEUROGENIC CLAUDICATION: Status: RESOLVED | Noted: 2019-01-10 | Resolved: 2019-04-23

## 2019-04-23 PROBLEM — M54.16 LEFT LUMBAR RADICULOPATHY: Status: RESOLVED | Noted: 2019-01-10 | Resolved: 2019-04-23

## 2019-04-23 NOTE — CONSULT LETTER
[Dear  ___] : Dear  [unfilled], [Sincerely,] : Sincerely, [Consult Letter:] : I had the pleasure of evaluating your patient, [unfilled]. [FreeTextEntry2] : Radha Davis MD\par [address]\[place] [address]  [Consult Closing:] : Thank you very much for allowing me to participate in the care of this patient.  If you have any questions, please do not hesitate to contact me. [FreeTextEntry1] : Ms. Marin is a 64-year-old female patient who was seen in our office today in follow up 3 months after she underwent an L4/5 lumbar decompression for lumbar stenosis. The patient is primarily Pakistani speaking and our nurse practitioner provided translation today. \par \par At this time, the patient is complaining still of right sided back pain with radiation into her leg. She denies any pain in her left leg which was her primary concern prior to her operation. The patient's numbness in her lower extremities have resolved. Patient denies any new bowel or bladder symptoms, or saddle anesthesia. \par \par On examination, the patient is alert, oriented, and compliant with the examination. The patient demonstrates full strength in her lower extremities bilaterally.\par \par The patient is completed with MRI scan of her lumbar spine dated April 15, 2019. Although the central stenosis and left-sided lateral foraminal stenosis is improved, the right sided stenosis at L4/5 with associated spondylolisthesis remains persistent.\par \par Taken together, the patient has a clinical history consistent with a radiculopathy on the right side. At this time, the patient has attempted physical therapy and medical management without success. At this time, I explained to the patient that she is a candidate for a right-sided medial facetectomy and decompression of the nerve roots at L4/5. I have requested dynamic views of her lumbar spine to rule out the possibility of a dynamic instability. If dynamic instability exists, the patient may need an L4/5 lumbar decompression and fusion. I look forward to seeing the patient back in our office to discuss the results of her x-rays once they are completed. [FreeTextEntry3] : \par Sawyer Shoemaker MD, PhD, FRCSC, FAANS\par \par Attending Neurosurgeon\par [facility]\par  of Neurosurgery\par [facility]\par \par [address]\[place] [address]\par \par Office: [phone]\par Fax: [phone]\par Email: [email or web address]\par

## 2019-04-23 NOTE — REASON FOR VISIT
[de-identified] :  L 4/5 lumbar decompression [de-identified] : 01/29/2019 [de-identified] : 81 [de-identified] : 2months  20 days

## 2019-04-25 ENCOUNTER — APPOINTMENT (OUTPATIENT)
Dept: NEUROSURGERY | Facility: CLINIC | Age: 64
End: 2019-04-25
Payer: COMMERCIAL

## 2019-04-25 VITALS
SYSTOLIC BLOOD PRESSURE: 155 MMHG | HEART RATE: 94 BPM | HEIGHT: 61.38 IN | BODY MASS INDEX: 37.28 KG/M2 | RESPIRATION RATE: 16 BRPM | DIASTOLIC BLOOD PRESSURE: 85 MMHG | TEMPERATURE: 98.1 F | WEIGHT: 200 LBS

## 2019-04-25 DIAGNOSIS — Z98.890 OTHER SPECIFIED POSTPROCEDURAL STATES: ICD-10-CM

## 2019-04-25 DIAGNOSIS — M54.16 RADICULOPATHY, LUMBAR REGION: ICD-10-CM

## 2019-04-25 PROCEDURE — 99024 POSTOP FOLLOW-UP VISIT: CPT

## 2019-04-25 NOTE — REASON FOR VISIT
[de-identified] : L 4/5 lumbar decompression  [de-identified] : 01/29/2019 [de-identified] : 88

## 2019-04-25 NOTE — CONSULT LETTER
[Dear  ___] : Dear  [unfilled], [Sincerely,] : Sincerely, [Consult Letter:] : I had the pleasure of evaluating your patient, [unfilled]. [Consult Closing:] : Thank you very much for allowing me to participate in the care of this patient.  If you have any questions, please do not hesitate to contact me. [FreeTextEntry2] : Radha Davis MD\par [address]\[place] [address]  [FreeTextEntry1] : Ms. Marin is a pleasant 64-year-old female patient who was seen in our office in postop followup after an L4/5 lumbar decompression on the left side. This visit marks a little over 3 months following her surgery. Translation was provided by an translation service today. \par \par Although the patient initially did well following her surgery, the patient developed right-sided leg pain approximately 1 month ago. Her left-sided leg pain remains resolved. The patient also complains of pain at the back of the head which started last week for which she has been taking ibuprofen. She states that the ibuprofen has been helping her with her right-sided leg pain and back pain as well as her head pain.\par \par On examination, the patient is alert, oriented, and compliant with the exam. The patient continues to demonstrate 5/5 strength in lower extremities bilaterally with hip flexion, knee extension, ankle dorsiflexion, ankle plantar flexion, and extension of the hallucis longus. The patient uses a low wheeled walker for stability.\par \par The patient is accompanied with flexion/extension x-ray of the lumbar spine which does not demonstrate any instability at L4/5. A previous MRI scan dated April 15, 2019 demonstrates residual stenosis at L4/5 on the right side.\par \par Taken together, the patient has a clinical history and radiographic findings consistent with a right-sided lumbar radiculopathy secondary to foraminal and lateral recess stenosis. I explained to the patient that, given the patient's new pain, she is a candidate for a decompression on the right side at L4/5. However, I reiterated that an operative solution remains a last resort only when necessary. At this time, I recommended continuing with physical therapy and consulting with pain management physician to determine whether or not she may be a candidate for injection therapy. I do not have a scheduled followup with the patient at this time, as the patient like to contact our office as needed for treatment. [FreeTextEntry3] : \par Sawyer Shoemaker MD, PhD, FRCSC, FAANS\par \par Attending Neurosurgeon\par [facility]\par  of Neurosurgery\par [facility]\par \par [address]\[place] [address]\par \par Office: [phone]\par Fax: [phone]\par Email: [email or web address]\par

## 2019-10-17 ENCOUNTER — APPOINTMENT (OUTPATIENT)
Dept: RHEUMATOLOGY | Facility: CLINIC | Age: 64
End: 2019-10-17

## 2019-10-31 ENCOUNTER — APPOINTMENT (OUTPATIENT)
Dept: RHEUMATOLOGY | Facility: CLINIC | Age: 64
End: 2019-10-31

## 2020-04-07 NOTE — PHYSICAL THERAPY INITIAL EVALUATION ADULT - SITTING BALANCE: STATIC
May have macrobid 100 mg BID for 3 days.  
Pt would like the nurse to send her in a prescription for uti at [facility] in [place] , please call when done , so she can pick home   
Spoke with patient and no further questions   
Spoke with patient and she states she gets UTI's a lot and knows when they are starting. She states she wants to catch it before it gets bad. She has the urge/pressure to urinate, but when she goes only a little bit comes out. No burning/no back pain/denies fevers.   Advised her she might need to come in to get a urine sample. Patient states, \" oh no, I am not coming into the office, if it gets bad enough then I will come in.\" she has been increasing her fluid intake/water and cranberry juice. Please advise                  
good balance
normal balance

## 2020-12-04 NOTE — ED ADULT TRIAGE NOTE - NS ED NURSE BANDS TYPE
Date of procedure: 12/04/20


Pre-op diagnosis: Colon Polyp Screening


Post-op diagnosis: other (No Colon Polyp or diverticular disease noted/ Minor, 

Internal Hemorrhoids/ No Bleeding associated with the procedure)


Procedure: 





Colonoscopy


Anesthesia: MAC


Surgeon: CONNER PEARCE


Estimated blood loss: none


Pathology: none


Condition: stable


Disposition: same day (Resume home medication and follow up in 1 to 2 weeks 

([phone]).)
Name band;

## 2022-03-09 ENCOUNTER — EMERGENCY (EMERGENCY)
Facility: HOSPITAL | Age: 67
LOS: 0 days | Discharge: HOME | End: 2022-03-10
Attending: EMERGENCY MEDICINE
Payer: MEDICARE

## 2022-03-09 VITALS — WEIGHT: 210.1 LBS | HEIGHT: 62 IN

## 2022-03-09 DIAGNOSIS — E78.5 HYPERLIPIDEMIA, UNSPECIFIED: ICD-10-CM

## 2022-03-09 DIAGNOSIS — G89.29 OTHER CHRONIC PAIN: ICD-10-CM

## 2022-03-09 DIAGNOSIS — I10 ESSENTIAL (PRIMARY) HYPERTENSION: ICD-10-CM

## 2022-03-09 DIAGNOSIS — E11.9 TYPE 2 DIABETES MELLITUS WITHOUT COMPLICATIONS: ICD-10-CM

## 2022-03-09 DIAGNOSIS — M79.605 PAIN IN LEFT LEG: ICD-10-CM

## 2022-03-09 DIAGNOSIS — Z79.84 LONG TERM (CURRENT) USE OF ORAL HYPOGLYCEMIC DRUGS: ICD-10-CM

## 2022-03-09 DIAGNOSIS — M54.9 DORSALGIA, UNSPECIFIED: ICD-10-CM

## 2022-03-09 LAB
ALBUMIN SERPL ELPH-MCNC: 3.6 G/DL — SIGNIFICANT CHANGE UP (ref 3.3–5)
ALP SERPL-CCNC: 76 U/L — SIGNIFICANT CHANGE UP (ref 40–120)
ALT FLD-CCNC: 32 U/L — SIGNIFICANT CHANGE UP (ref 12–78)
ANION GAP SERPL CALC-SCNC: 5 MMOL/L — SIGNIFICANT CHANGE UP (ref 5–17)
APPEARANCE UR: CLEAR — SIGNIFICANT CHANGE UP
AST SERPL-CCNC: 18 U/L — SIGNIFICANT CHANGE UP (ref 15–37)
BASOPHILS # BLD AUTO: 0.01 K/UL — SIGNIFICANT CHANGE UP (ref 0–0.2)
BASOPHILS NFR BLD AUTO: 0.1 % — SIGNIFICANT CHANGE UP (ref 0–2)
BILIRUB SERPL-MCNC: 0.4 MG/DL — SIGNIFICANT CHANGE UP (ref 0.2–1.2)
BILIRUB UR-MCNC: NEGATIVE — SIGNIFICANT CHANGE UP
BUN SERPL-MCNC: 29 MG/DL — HIGH (ref 7–23)
CALCIUM SERPL-MCNC: 9.1 MG/DL — SIGNIFICANT CHANGE UP (ref 8.5–10.1)
CHLORIDE SERPL-SCNC: 103 MMOL/L — SIGNIFICANT CHANGE UP (ref 96–108)
CO2 SERPL-SCNC: 28 MMOL/L — SIGNIFICANT CHANGE UP (ref 22–31)
COLOR SPEC: YELLOW — SIGNIFICANT CHANGE UP
CREAT SERPL-MCNC: 0.62 MG/DL — SIGNIFICANT CHANGE UP (ref 0.5–1.3)
D DIMER BLD IA.RAPID-MCNC: <150 NG/ML DDU — SIGNIFICANT CHANGE UP
DIFF PNL FLD: NEGATIVE — SIGNIFICANT CHANGE UP
EGFR: 99 ML/MIN/1.73M2 — SIGNIFICANT CHANGE UP
EOSINOPHIL # BLD AUTO: 0.04 K/UL — SIGNIFICANT CHANGE UP (ref 0–0.5)
EOSINOPHIL NFR BLD AUTO: 0.4 % — SIGNIFICANT CHANGE UP (ref 0–6)
GLUCOSE SERPL-MCNC: 109 MG/DL — HIGH (ref 70–99)
GLUCOSE UR QL: 1000 MG/DL
HCT VFR BLD CALC: 37.9 % — SIGNIFICANT CHANGE UP (ref 34.5–45)
HGB BLD-MCNC: 11.7 G/DL — SIGNIFICANT CHANGE UP (ref 11.5–15.5)
IMM GRANULOCYTES NFR BLD AUTO: 0.4 % — SIGNIFICANT CHANGE UP (ref 0–1.5)
KETONES UR-MCNC: ABNORMAL
LEUKOCYTE ESTERASE UR-ACNC: NEGATIVE — SIGNIFICANT CHANGE UP
LIDOCAIN IGE QN: 81 U/L — SIGNIFICANT CHANGE UP (ref 73–393)
LYMPHOCYTES # BLD AUTO: 2.71 K/UL — SIGNIFICANT CHANGE UP (ref 1–3.3)
LYMPHOCYTES # BLD AUTO: 26.6 % — SIGNIFICANT CHANGE UP (ref 13–44)
MCHC RBC-ENTMCNC: 25.3 PG — LOW (ref 27–34)
MCHC RBC-ENTMCNC: 30.9 GM/DL — LOW (ref 32–36)
MCV RBC AUTO: 82 FL — SIGNIFICANT CHANGE UP (ref 80–100)
MONOCYTES # BLD AUTO: 0.7 K/UL — SIGNIFICANT CHANGE UP (ref 0–0.9)
MONOCYTES NFR BLD AUTO: 6.9 % — SIGNIFICANT CHANGE UP (ref 2–14)
NEUTROPHILS # BLD AUTO: 6.7 K/UL — SIGNIFICANT CHANGE UP (ref 1.8–7.4)
NEUTROPHILS NFR BLD AUTO: 65.6 % — SIGNIFICANT CHANGE UP (ref 43–77)
NITRITE UR-MCNC: NEGATIVE — SIGNIFICANT CHANGE UP
PH UR: 5 — SIGNIFICANT CHANGE UP (ref 5–8)
PLATELET # BLD AUTO: 288 K/UL — SIGNIFICANT CHANGE UP (ref 150–400)
POTASSIUM SERPL-MCNC: 3.8 MMOL/L — SIGNIFICANT CHANGE UP (ref 3.5–5.3)
POTASSIUM SERPL-SCNC: 3.8 MMOL/L — SIGNIFICANT CHANGE UP (ref 3.5–5.3)
PROT SERPL-MCNC: 7.1 GM/DL — SIGNIFICANT CHANGE UP (ref 6–8.3)
PROT UR-MCNC: NEGATIVE — SIGNIFICANT CHANGE UP
RBC # BLD: 4.62 M/UL — SIGNIFICANT CHANGE UP (ref 3.8–5.2)
RBC # FLD: 15.2 % — HIGH (ref 10.3–14.5)
SODIUM SERPL-SCNC: 136 MMOL/L — SIGNIFICANT CHANGE UP (ref 135–145)
SP GR SPEC: 1.01 — SIGNIFICANT CHANGE UP (ref 1.01–1.02)
TROPONIN I, HIGH SENSITIVITY RESULT: 11.21 NG/L — SIGNIFICANT CHANGE UP
TROPONIN I, HIGH SENSITIVITY RESULT: 9.4 NG/L — SIGNIFICANT CHANGE UP
UROBILINOGEN FLD QL: NEGATIVE — SIGNIFICANT CHANGE UP
WBC # BLD: 10.2 K/UL — SIGNIFICANT CHANGE UP (ref 3.8–10.5)
WBC # FLD AUTO: 10.2 K/UL — SIGNIFICANT CHANGE UP (ref 3.8–10.5)

## 2022-03-09 PROCEDURE — 85379 FIBRIN DEGRADATION QUANT: CPT

## 2022-03-09 PROCEDURE — 71046 X-RAY EXAM CHEST 2 VIEWS: CPT | Mod: 26

## 2022-03-09 PROCEDURE — 85025 COMPLETE CBC W/AUTO DIFF WBC: CPT

## 2022-03-09 PROCEDURE — 80053 COMPREHEN METABOLIC PANEL: CPT

## 2022-03-09 PROCEDURE — 81003 URINALYSIS AUTO W/O SCOPE: CPT

## 2022-03-09 PROCEDURE — 93010 ELECTROCARDIOGRAM REPORT: CPT

## 2022-03-09 PROCEDURE — 83690 ASSAY OF LIPASE: CPT

## 2022-03-09 PROCEDURE — 71046 X-RAY EXAM CHEST 2 VIEWS: CPT

## 2022-03-09 PROCEDURE — 36415 COLL VENOUS BLD VENIPUNCTURE: CPT

## 2022-03-09 PROCEDURE — 96374 THER/PROPH/DIAG INJ IV PUSH: CPT

## 2022-03-09 PROCEDURE — 99285 EMERGENCY DEPT VISIT HI MDM: CPT | Mod: 25

## 2022-03-09 PROCEDURE — 96375 TX/PRO/DX INJ NEW DRUG ADDON: CPT

## 2022-03-09 PROCEDURE — 84484 ASSAY OF TROPONIN QUANT: CPT

## 2022-03-09 PROCEDURE — 99285 EMERGENCY DEPT VISIT HI MDM: CPT

## 2022-03-09 PROCEDURE — 93005 ELECTROCARDIOGRAM TRACING: CPT

## 2022-03-09 RX ORDER — MORPHINE SULFATE 50 MG/1
4 CAPSULE, EXTENDED RELEASE ORAL ONCE
Refills: 0 | Status: DISCONTINUED | OUTPATIENT
Start: 2022-03-09 | End: 2022-03-09

## 2022-03-09 RX ORDER — CYCLOBENZAPRINE HYDROCHLORIDE 10 MG/1
1 TABLET, FILM COATED ORAL
Qty: 20 | Refills: 0
Start: 2022-03-09

## 2022-03-09 RX ORDER — ONDANSETRON 8 MG/1
4 TABLET, FILM COATED ORAL ONCE
Refills: 0 | Status: COMPLETED | OUTPATIENT
Start: 2022-03-09 | End: 2022-03-09

## 2022-03-09 RX ORDER — LIDOCAINE 4 G/100G
1 CREAM TOPICAL ONCE
Refills: 0 | Status: COMPLETED | OUTPATIENT
Start: 2022-03-09 | End: 2022-03-09

## 2022-03-09 RX ORDER — SODIUM CHLORIDE 9 MG/ML
1000 INJECTION INTRAMUSCULAR; INTRAVENOUS; SUBCUTANEOUS ONCE
Refills: 0 | Status: COMPLETED | OUTPATIENT
Start: 2022-03-09 | End: 2022-03-09

## 2022-03-09 RX ORDER — KETOROLAC TROMETHAMINE 30 MG/ML
15 SYRINGE (ML) INJECTION ONCE
Refills: 0 | Status: DISCONTINUED | OUTPATIENT
Start: 2022-03-09 | End: 2022-03-09

## 2022-03-09 RX ADMIN — ONDANSETRON 4 MILLIGRAM(S): 8 TABLET, FILM COATED ORAL at 20:28

## 2022-03-09 RX ADMIN — MORPHINE SULFATE 4 MILLIGRAM(S): 50 CAPSULE, EXTENDED RELEASE ORAL at 20:28

## 2022-03-09 RX ADMIN — LIDOCAINE 1 PATCH: 4 CREAM TOPICAL at 20:28

## 2022-03-09 RX ADMIN — SODIUM CHLORIDE 2000 MILLILITER(S): 9 INJECTION INTRAMUSCULAR; INTRAVENOUS; SUBCUTANEOUS at 20:28

## 2022-03-09 RX ADMIN — Medication 15 MILLIGRAM(S): at 23:22

## 2022-03-09 NOTE — ED STATDOCS - PHYSICAL EXAMINATION
Constitutional: NAD AAOx3  Eyes: PERRLA EOMI  Head: Normocephalic atraumatic  Mouth: MMM  Cardiac: regular rate   Resp: Lungs CTAB  GI: Abd s/nt/nd  Neuro: CN2-12 intact  Extremities: Intact distal pulses b/l, no calf tenderness, normal ROM b/l UE and LE   Skin: No rashes Constitutional: NAD AAOx3, obese  Eyes: PERRLA EOMI  Head: Normocephalic atraumatic  Mouth: MMM  Cardiac: regular rate   Resp: Lungs CTAB  GI: Abd s/nt/nd  Neuro: CN2-12 intact  Extremities: Intact distal pulses b/l, no calf tenderness, normal ROM b/l UE and LE   Skin: No rashes

## 2022-03-09 NOTE — ED STATDOCS - CARE PROVIDERS DIRECT ADDRESSES
[email or web address]

## 2022-03-09 NOTE — ED STATDOCS - PATIENT PORTAL LINK FT
Last OV 08/13/18 with Dr. Mo Senate Date Due    DTaP/Tdap/Td vaccine (1 - Tdap) 01/19/1976    Shingles Vaccine (1 of 2 - 2 Dose Series) 01/19/2007    Colon cancer screen colonoscopy  01/19/2007    Breast cancer screen  01/19/2018    Flu vaccine (1) 09/01/2018    Cervical cancer screen  09/14/2018    Potassium monitoring  06/04/2019    Creatinine monitoring  06/04/2019    Lipid screen  10/05/2022    Hepatitis C screen  Completed    HIV screen  Completed             (applicable per patient's age: Cancer Screenings, Depression Screening, Fall Risk Screening, Immunizations)    LDL Calculated (mg/dL)   Date Value   10/05/2017 122     AST (U/L)   Date Value   10/05/2017 18     ALT (U/L)   Date Value   10/05/2017 17     BUN (mg/dL)   Date Value   06/04/2018 11      (goal A1C is < 7)   (goal LDL is <100) need 30-50% reduction from baseline     BP Readings from Last 3 Encounters:   08/13/18 120/84   06/04/18 126/80   01/19/18 (!) 153/83    (goal /80)      All Future Testing planned in [facility]:      Next Visit Date:  Future Appointments  Date Time Provider Gildardo Alberto   12/6/2018 1:00 PM MD Charlotte Monroe            Patient Active Problem List:     Essential hypertension     Arthritis     High cholesterol You can access the FollowMyHealth Patient Portal offered by [facility] by registering at the following website: [email or web address]. By joining [employer]’s FollowMyHealth portal, you will also be able to view your health information using other applications (apps) compatible with our system.

## 2022-03-09 NOTE — ED STATDOCS - OBJECTIVE STATEMENT
65 y/o female with a PMHx of DM, HTN, HLD presents to the ED with constant chest pain x8 days and L leg pain x3 days. No bowel or bladder incontinence. No falls, no trauma. no abdominal pain, no SOB, no LE edema. Pt tok Tylenol for the pain with no relief. 65 y/o female with a PMHx of DM, HTN, HLD presents to the ED with constant chest pain x8 days and back pain with L leg pain x3 days. No bowel or bladder incontinence. No falls, no trauma. no abdominal pain, no SOB, no LE edema.  Pt has a history of chronic back pain and has had spine surgery in the past with Dr. Shoemaker. Pt denies any cardiac history. Pt tok Tylenol for the pain with no relief. 65 y/o female with a PMHx of DM, HTN, HLD presents to the ED with constant chest pain x8 days and back pain with L leg pain x3 days. No bowel or bladder incontinence. No falls, no trauma. no abdominal pain, no SOB, no LE edema.  Pt has a history of chronic back pain and has had spine surgery in the past with Dr. Shoemaker. Pt denies any cardiac history.

## 2022-03-09 NOTE — ED STATDOCS - NS ED ROS FT
Constitutional: No fever or chills  Eyes: No visual changes  HEENT: No throat pain  CV: +chest pain  Resp: No SOB no cough  GI: No abd pain, nausea or vomiting  : No dysuria  MSK: +L leg pain  Skin: No rash  Neuro: No headache

## 2022-03-09 NOTE — ED STATDOCS - CARE PROVIDER_API CALL
Sawyer Shoemaker; PhD)  Neurosurgery  [address] [facility], 2nd [place] [address]  Phone: [phone]  Fax: [phone]  Follow Up Time:

## 2022-03-09 NOTE — ED STATDOCS - ATTENDING CONTRIBUTION TO CARE
I, Orin Evans MD, personally saw the patient with ACP.  I have personally performed a face to face diagnostic evaluation on this patient.  I have reviewed the ACP note and agree with the history, exam, and plan of care, except as noted.  I personally saw the patient and performed a substantive portion of the visit including all aspects of the medical decision making.

## 2022-03-09 NOTE — ED ADULT NURSE NOTE - OBJECTIVE STATEMENT
Pt presents to ER c/o CP and left leg pain. CP began 8 days PTA and leg pain began 3 days PTA. Pt reports difficulty ambulating; denies fall. Denies SOB. AO  x 3 oriented to baseline, normal breathing pattern with no difficulty.

## 2022-03-09 NOTE — ED STATDOCS - CLINICAL SUMMARY MEDICAL DECISION MAKING FREE TEXT BOX
Cardiac workup. EKG is NSR at a rate of 66 with no acute ST/T wave changes, it is a normal EKG. Pt is here for both chest pain x 8 days and 3 days of worsening chronic left sided back pain. In terms of her back pain, she has no red flag symptoms/signs. We will obtained, cardiac work up, she was treated for pain, EKG is NSR at a rate of 66 with no acute ST/T wave changes, it is a normal EKG. Pt is here for both chest pain x 8 days and 3 days of worsening chronic left sided back pain. In terms of her back pain, she has no red flag symptoms/signs. We will obtained, cardiac work up, she was treated for pain, troponin x 2, D dimer is negative. I discussed all results with the patient using  phone, she will be written for flexeril, given f/u with pcp, she is dc in stable condition.

## 2022-03-09 NOTE — ED STATDOCS - NSICDXPASTMEDICALHX_GEN_ALL_CORE_FT
PAST MEDICAL HISTORY:  DM (diabetes mellitus)     HLD (hyperlipidemia)     HTN (hypertension)

## 2022-03-10 VITALS
OXYGEN SATURATION: 98 % | TEMPERATURE: 99 F | DIASTOLIC BLOOD PRESSURE: 70 MMHG | RESPIRATION RATE: 18 BRPM | SYSTOLIC BLOOD PRESSURE: 160 MMHG | HEART RATE: 72 BPM

## 2022-03-10 RX ADMIN — Medication 15 MILLIGRAM(S): at 00:00

## 2022-03-13 ENCOUNTER — INPATIENT (INPATIENT)
Facility: HOSPITAL | Age: 67
LOS: 2 days | Discharge: NURSING, REHAB OR LONG-TERM CARE | DRG: 155 | End: 2022-03-16
Attending: INTERNAL MEDICINE | Admitting: HOSPITALIST
Payer: MEDICARE

## 2022-03-13 VITALS
WEIGHT: 255.07 LBS | OXYGEN SATURATION: 99 % | SYSTOLIC BLOOD PRESSURE: 120 MMHG | RESPIRATION RATE: 20 BRPM | TEMPERATURE: 98 F | HEIGHT: 62 IN | DIASTOLIC BLOOD PRESSURE: 62 MMHG | HEART RATE: 73 BPM

## 2022-03-13 DIAGNOSIS — S02.2XXA FRACTURE OF NASAL BONES, INITIAL ENCOUNTER FOR CLOSED FRACTURE: ICD-10-CM

## 2022-03-13 LAB
ALBUMIN SERPL ELPH-MCNC: 3.5 G/DL — SIGNIFICANT CHANGE UP (ref 3.3–5)
ALP SERPL-CCNC: 87 U/L — SIGNIFICANT CHANGE UP (ref 40–120)
ALT FLD-CCNC: 36 U/L — SIGNIFICANT CHANGE UP (ref 12–78)
ANION GAP SERPL CALC-SCNC: 6 MMOL/L — SIGNIFICANT CHANGE UP (ref 5–17)
AST SERPL-CCNC: 22 U/L — SIGNIFICANT CHANGE UP (ref 15–37)
BASOPHILS # BLD AUTO: 0.03 K/UL — SIGNIFICANT CHANGE UP (ref 0–0.2)
BASOPHILS NFR BLD AUTO: 0.4 % — SIGNIFICANT CHANGE UP (ref 0–2)
BILIRUB SERPL-MCNC: 0.3 MG/DL — SIGNIFICANT CHANGE UP (ref 0.2–1.2)
BUN SERPL-MCNC: 16 MG/DL — SIGNIFICANT CHANGE UP (ref 7–23)
CALCIUM SERPL-MCNC: 8.8 MG/DL — SIGNIFICANT CHANGE UP (ref 8.5–10.1)
CHLORIDE SERPL-SCNC: 106 MMOL/L — SIGNIFICANT CHANGE UP (ref 96–108)
CO2 SERPL-SCNC: 26 MMOL/L — SIGNIFICANT CHANGE UP (ref 22–31)
CREAT SERPL-MCNC: 0.86 MG/DL — SIGNIFICANT CHANGE UP (ref 0.5–1.3)
EGFR: 75 ML/MIN/1.73M2 — SIGNIFICANT CHANGE UP
EOSINOPHIL # BLD AUTO: 0.11 K/UL — SIGNIFICANT CHANGE UP (ref 0–0.5)
EOSINOPHIL NFR BLD AUTO: 1.5 % — SIGNIFICANT CHANGE UP (ref 0–6)
GLUCOSE SERPL-MCNC: 184 MG/DL — HIGH (ref 70–99)
HCT VFR BLD CALC: 39.4 % — SIGNIFICANT CHANGE UP (ref 34.5–45)
HGB BLD-MCNC: 12.2 G/DL — SIGNIFICANT CHANGE UP (ref 11.5–15.5)
IMM GRANULOCYTES NFR BLD AUTO: 0.3 % — SIGNIFICANT CHANGE UP (ref 0–1.5)
LYMPHOCYTES # BLD AUTO: 1.74 K/UL — SIGNIFICANT CHANGE UP (ref 1–3.3)
LYMPHOCYTES # BLD AUTO: 23.8 % — SIGNIFICANT CHANGE UP (ref 13–44)
MCHC RBC-ENTMCNC: 26.2 PG — LOW (ref 27–34)
MCHC RBC-ENTMCNC: 31 GM/DL — LOW (ref 32–36)
MCV RBC AUTO: 84.5 FL — SIGNIFICANT CHANGE UP (ref 80–100)
MONOCYTES # BLD AUTO: 0.59 K/UL — SIGNIFICANT CHANGE UP (ref 0–0.9)
MONOCYTES NFR BLD AUTO: 8.1 % — SIGNIFICANT CHANGE UP (ref 2–14)
NEUTROPHILS # BLD AUTO: 4.81 K/UL — SIGNIFICANT CHANGE UP (ref 1.8–7.4)
NEUTROPHILS NFR BLD AUTO: 65.9 % — SIGNIFICANT CHANGE UP (ref 43–77)
PLATELET # BLD AUTO: 238 K/UL — SIGNIFICANT CHANGE UP (ref 150–400)
POTASSIUM SERPL-MCNC: 4.4 MMOL/L — SIGNIFICANT CHANGE UP (ref 3.5–5.3)
POTASSIUM SERPL-SCNC: 4.4 MMOL/L — SIGNIFICANT CHANGE UP (ref 3.5–5.3)
PROT SERPL-MCNC: 6.9 GM/DL — SIGNIFICANT CHANGE UP (ref 6–8.3)
RBC # BLD: 4.66 M/UL — SIGNIFICANT CHANGE UP (ref 3.8–5.2)
RBC # FLD: 15.5 % — HIGH (ref 10.3–14.5)
SODIUM SERPL-SCNC: 138 MMOL/L — SIGNIFICANT CHANGE UP (ref 135–145)
TROPONIN I, HIGH SENSITIVITY RESULT: 5.02 NG/L — SIGNIFICANT CHANGE UP
WBC # BLD: 7.3 K/UL — SIGNIFICANT CHANGE UP (ref 3.8–10.5)
WBC # FLD AUTO: 7.3 K/UL — SIGNIFICANT CHANGE UP (ref 3.8–10.5)

## 2022-03-13 PROCEDURE — 99222 1ST HOSP IP/OBS MODERATE 55: CPT

## 2022-03-13 PROCEDURE — 97163 PT EVAL HIGH COMPLEX 45 MIN: CPT | Mod: GP

## 2022-03-13 PROCEDURE — 85027 COMPLETE CBC AUTOMATED: CPT

## 2022-03-13 PROCEDURE — 71260 CT THORAX DX C+: CPT | Mod: 26,MA

## 2022-03-13 PROCEDURE — 85025 COMPLETE CBC W/AUTO DIFF WBC: CPT

## 2022-03-13 PROCEDURE — U0005: CPT

## 2022-03-13 PROCEDURE — 74177 CT ABD & PELVIS W/CONTRAST: CPT | Mod: 26,MA

## 2022-03-13 PROCEDURE — 12011 RPR F/E/E/N/L/M 2.5 CM/<: CPT

## 2022-03-13 PROCEDURE — 83036 HEMOGLOBIN GLYCOSYLATED A1C: CPT

## 2022-03-13 PROCEDURE — 73590 X-RAY EXAM OF LOWER LEG: CPT | Mod: LT

## 2022-03-13 PROCEDURE — 80053 COMPREHEN METABOLIC PANEL: CPT

## 2022-03-13 PROCEDURE — 73503 X-RAY EXAM HIP UNI 4/> VIEWS: CPT | Mod: 26,LT

## 2022-03-13 PROCEDURE — 97116 GAIT TRAINING THERAPY: CPT | Mod: GP

## 2022-03-13 PROCEDURE — 82962 GLUCOSE BLOOD TEST: CPT

## 2022-03-13 PROCEDURE — 85610 PROTHROMBIN TIME: CPT

## 2022-03-13 PROCEDURE — 70450 CT HEAD/BRAIN W/O DYE: CPT | Mod: 26,MA

## 2022-03-13 PROCEDURE — 76376 3D RENDER W/INTRP POSTPROCES: CPT

## 2022-03-13 PROCEDURE — 36415 COLL VENOUS BLD VENIPUNCTURE: CPT

## 2022-03-13 PROCEDURE — 99285 EMERGENCY DEPT VISIT HI MDM: CPT | Mod: 25

## 2022-03-13 PROCEDURE — 76376 3D RENDER W/INTRP POSTPROCES: CPT | Mod: 26

## 2022-03-13 PROCEDURE — 72125 CT NECK SPINE W/O DYE: CPT | Mod: 26,MA

## 2022-03-13 PROCEDURE — 73700 CT LOWER EXTREMITY W/O DYE: CPT | Mod: LT

## 2022-03-13 PROCEDURE — 73562 X-RAY EXAM OF KNEE 3: CPT | Mod: 26,RT

## 2022-03-13 PROCEDURE — 70486 CT MAXILLOFACIAL W/O DYE: CPT | Mod: 26,MA

## 2022-03-13 PROCEDURE — 73080 X-RAY EXAM OF ELBOW: CPT | Mod: LT

## 2022-03-13 PROCEDURE — U0003: CPT

## 2022-03-13 PROCEDURE — 73552 X-RAY EXAM OF FEMUR 2/>: CPT | Mod: 26,LT

## 2022-03-13 PROCEDURE — 80048 BASIC METABOLIC PNL TOTAL CA: CPT

## 2022-03-13 PROCEDURE — 93010 ELECTROCARDIOGRAM REPORT: CPT

## 2022-03-13 PROCEDURE — 73610 X-RAY EXAM OF ANKLE: CPT | Mod: LT

## 2022-03-13 RX ORDER — ATORVASTATIN CALCIUM 80 MG/1
20 TABLET, FILM COATED ORAL AT BEDTIME
Refills: 0 | Status: DISCONTINUED | OUTPATIENT
Start: 2022-03-13 | End: 2022-03-16

## 2022-03-13 RX ORDER — ACETAMINOPHEN 500 MG
650 TABLET ORAL EVERY 6 HOURS
Refills: 0 | Status: DISCONTINUED | OUTPATIENT
Start: 2022-03-13 | End: 2022-03-16

## 2022-03-13 RX ORDER — ENOXAPARIN SODIUM 100 MG/ML
40 INJECTION SUBCUTANEOUS
Qty: 0 | Refills: 0 | DISCHARGE
Start: 2022-03-13 | End: 2022-04-10

## 2022-03-13 RX ORDER — OXYCODONE HYDROCHLORIDE 5 MG/1
5 TABLET ORAL EVERY 4 HOURS
Refills: 0 | Status: DISCONTINUED | OUTPATIENT
Start: 2022-03-13 | End: 2022-03-14

## 2022-03-13 RX ORDER — DEXTROSE 50 % IN WATER 50 %
25 SYRINGE (ML) INTRAVENOUS ONCE
Refills: 0 | Status: DISCONTINUED | OUTPATIENT
Start: 2022-03-13 | End: 2022-03-16

## 2022-03-13 RX ORDER — SENNA PLUS 8.6 MG/1
2 TABLET ORAL AT BEDTIME
Refills: 0 | Status: DISCONTINUED | OUTPATIENT
Start: 2022-03-13 | End: 2022-03-16

## 2022-03-13 RX ORDER — POLYETHYLENE GLYCOL 3350 17 G/17G
17 POWDER, FOR SOLUTION ORAL DAILY
Refills: 0 | Status: DISCONTINUED | OUTPATIENT
Start: 2022-03-13 | End: 2022-03-16

## 2022-03-13 RX ORDER — INSULIN GLARGINE 100 [IU]/ML
10 INJECTION, SOLUTION SUBCUTANEOUS
Qty: 0 | Refills: 0 | DISCHARGE

## 2022-03-13 RX ORDER — SODIUM CHLORIDE 9 MG/ML
1000 INJECTION, SOLUTION INTRAVENOUS
Refills: 0 | Status: DISCONTINUED | OUTPATIENT
Start: 2022-03-13 | End: 2022-03-16

## 2022-03-13 RX ORDER — CEPHALEXIN 500 MG
500 CAPSULE ORAL ONCE
Refills: 0 | Status: COMPLETED | OUTPATIENT
Start: 2022-03-13 | End: 2022-03-13

## 2022-03-13 RX ORDER — PANTOPRAZOLE SODIUM 20 MG/1
40 TABLET, DELAYED RELEASE ORAL
Refills: 0 | Status: DISCONTINUED | OUTPATIENT
Start: 2022-03-13 | End: 2022-03-16

## 2022-03-13 RX ORDER — INSULIN GLARGINE 100 [IU]/ML
10 INJECTION, SOLUTION SUBCUTANEOUS AT BEDTIME
Refills: 0 | Status: DISCONTINUED | OUTPATIENT
Start: 2022-03-13 | End: 2022-03-16

## 2022-03-13 RX ORDER — GLUCAGON INJECTION, SOLUTION 0.5 MG/.1ML
1 INJECTION, SOLUTION SUBCUTANEOUS ONCE
Refills: 0 | Status: DISCONTINUED | OUTPATIENT
Start: 2022-03-13 | End: 2022-03-16

## 2022-03-13 RX ORDER — INSULIN LISPRO 100/ML
VIAL (ML) SUBCUTANEOUS
Refills: 0 | Status: DISCONTINUED | OUTPATIENT
Start: 2022-03-13 | End: 2022-03-16

## 2022-03-13 RX ORDER — AMLODIPINE BESYLATE 2.5 MG/1
5 TABLET ORAL DAILY
Refills: 0 | Status: DISCONTINUED | OUTPATIENT
Start: 2022-03-13 | End: 2022-03-16

## 2022-03-13 RX ORDER — TETANUS TOXOID, REDUCED DIPHTHERIA TOXOID AND ACELLULAR PERTUSSIS VACCINE, ADSORBED 5; 2.5; 8; 8; 2.5 [IU]/.5ML; [IU]/.5ML; UG/.5ML; UG/.5ML; UG/.5ML
0.5 SUSPENSION INTRAMUSCULAR ONCE
Refills: 0 | Status: COMPLETED | OUTPATIENT
Start: 2022-03-13 | End: 2022-03-13

## 2022-03-13 RX ORDER — LANOLIN ALCOHOL/MO/W.PET/CERES
3 CREAM (GRAM) TOPICAL AT BEDTIME
Refills: 0 | Status: DISCONTINUED | OUTPATIENT
Start: 2022-03-13 | End: 2022-03-16

## 2022-03-13 RX ORDER — ONDANSETRON 8 MG/1
4 TABLET, FILM COATED ORAL EVERY 8 HOURS
Refills: 0 | Status: DISCONTINUED | OUTPATIENT
Start: 2022-03-13 | End: 2022-03-16

## 2022-03-13 RX ORDER — DEXTROSE 50 % IN WATER 50 %
12.5 SYRINGE (ML) INTRAVENOUS ONCE
Refills: 0 | Status: DISCONTINUED | OUTPATIENT
Start: 2022-03-13 | End: 2022-03-16

## 2022-03-13 RX ORDER — ACETAMINOPHEN 500 MG
650 TABLET ORAL ONCE
Refills: 0 | Status: COMPLETED | OUTPATIENT
Start: 2022-03-13 | End: 2022-03-13

## 2022-03-13 RX ORDER — METOPROLOL TARTRATE 50 MG
50 TABLET ORAL DAILY
Refills: 0 | Status: DISCONTINUED | OUTPATIENT
Start: 2022-03-13 | End: 2022-03-16

## 2022-03-13 RX ORDER — DEXTROSE 50 % IN WATER 50 %
15 SYRINGE (ML) INTRAVENOUS ONCE
Refills: 0 | Status: DISCONTINUED | OUTPATIENT
Start: 2022-03-13 | End: 2022-03-16

## 2022-03-13 RX ORDER — SODIUM CHLORIDE 9 MG/ML
1000 INJECTION INTRAMUSCULAR; INTRAVENOUS; SUBCUTANEOUS
Refills: 0 | Status: DISCONTINUED | OUTPATIENT
Start: 2022-03-13 | End: 2022-03-16

## 2022-03-13 RX ORDER — CYCLOBENZAPRINE HYDROCHLORIDE 10 MG/1
10 TABLET, FILM COATED ORAL THREE TIMES A DAY
Refills: 0 | Status: DISCONTINUED | OUTPATIENT
Start: 2022-03-13 | End: 2022-03-16

## 2022-03-13 RX ADMIN — Medication 650 MILLIGRAM(S): at 17:22

## 2022-03-13 RX ADMIN — OXYCODONE HYDROCHLORIDE 5 MILLIGRAM(S): 5 TABLET ORAL at 23:52

## 2022-03-13 RX ADMIN — TETANUS TOXOID, REDUCED DIPHTHERIA TOXOID AND ACELLULAR PERTUSSIS VACCINE, ADSORBED 0.5 MILLILITER(S): 5; 2.5; 8; 8; 2.5 SUSPENSION INTRAMUSCULAR at 17:23

## 2022-03-13 RX ADMIN — Medication 650 MILLIGRAM(S): at 21:44

## 2022-03-13 RX ADMIN — INSULIN GLARGINE 10 UNIT(S): 100 INJECTION, SOLUTION SUBCUTANEOUS at 23:21

## 2022-03-13 RX ADMIN — Medication 500 MILLIGRAM(S): at 20:19

## 2022-03-13 RX ADMIN — ATORVASTATIN CALCIUM 20 MILLIGRAM(S): 80 TABLET, FILM COATED ORAL at 23:21

## 2022-03-13 RX ADMIN — Medication 650 MILLIGRAM(S): at 22:14

## 2022-03-13 RX ADMIN — OXYCODONE HYDROCHLORIDE 5 MILLIGRAM(S): 5 TABLET ORAL at 23:22

## 2022-03-13 RX ADMIN — SENNA PLUS 2 TABLET(S): 8.6 TABLET ORAL at 23:21

## 2022-03-13 NOTE — ED PROVIDER NOTE - OBJECTIVE STATEMENT
67 y/o female with a PMHx of  HTN, HLD, DM presents to the ED s/p fall. Pt refuse c-collar. 65 y/o female with a PMHx of  HTN, HLD, DM presents to the ED s/p fall. Pt refuse c-collar.  States she fell down while walking with cane. Not sure if she tripped or loss balance.  Reports that her neck, shoulders, back and fingers hurt.  Denies having CP, SOB or dizziness before falling.  [id number] used. NKDA. Denies smoking, drinking or illegal drug use. 65 y/o female with a PMHx of  HTN, HLD, DM presents to the ED s/p fall. Pt refuse c-collar.  States she fell down while walking with cane. Not sure if she tripped or loss balance.  Reports that her neck, shoulders, back and legs  Denies having CP, SOB or dizziness before falling.  [id number] used. NKDA. Denies smoking, drinking or illegal drug use.

## 2022-03-13 NOTE — H&P ADULT - HISTORY OF PRESENT ILLNESS
66 year old female patient who currently ambulates at baseline with a cane and history noted for prior admission for difficulty ambulating presented to the ED after a mechanical fall. Patient endorses ambulating with her cane when she felt the sudden feeling as though she would fall- patient then fell forward. Denies any preceding dizziness, headache, chest pain, palpitations or pre-syncope.. Patient has not fallen in over a year. Patient endorses mild lower back pain and b/l hand pain since fall. Denies any loss of consciousness as a result of fall.        In the ED patient found to have Nasal bone fracture. A nasal laceration was repaired by ED provider.

## 2022-03-13 NOTE — H&P ADULT - ASSESSMENT
66 year old female patient with mechanical fall and difficulty ambulating        -Admit to Medsurg        # Mechanical fall/ Difficulty ambulating  -will get PT evaluation  -pain control  -may benefit from Short term rehab placement  -will consult social work    #Nasal Bone fracture  -Supportive care    #HTN  -on lisinopril and amlodipine    # HLD  -on statin    # DM2  -hold oral hypoglycemics  -ISS    #DVT ppx  -scds   66 year old female patient with mechanical fall and difficulty ambulating        -Admit to Medsurg        # Mechanical fall/ Difficulty ambulating  -will get PT evaluation  -pain control  -may benefit from Short term rehab placement  -will consult social work      # Left ankle pain/ swelling  -likely sprain  -will get routine x-rays  -pain control  -RICE    #Nasal Bone fracture  -Supportive care    #HTN  -on lisinopril and amlodipine    # HLD  -on statin    # DM2  -hold oral hypoglycemics  -ISS    #DVT ppx  -scds

## 2022-03-13 NOTE — ED ADULT NURSE NOTE - NSIMPLEMENTINTERV_GEN_ALL_ED
Implemented All Fall Risk Interventions:  [place] to call system. Call bell, personal items and telephone within reach. Instruct patient to call for assistance. Room bathroom lighting operational. Non-slip footwear when patient is off stretcher. Physically safe environment: no spills, clutter or unnecessary equipment. Stretcher in lowest position, wheels locked, appropriate side rails in place. Provide visual cue, wrist band, yellow gown, etc. Monitor gait and stability. Monitor for mental status changes and reorient to person, place, and time. Review medications for side effects contributing to fall risk. Reinforce activity limits and safety measures with patient and family.

## 2022-03-13 NOTE — ED ADULT NURSE NOTE - OBJECTIVE STATEMENT
Pt presents to ED s/p mechnical trip and fall. Pt states she was getting out of her car and fell face first onto concrete. Pt denies blood thinner use. C/of of neck, left leg and right ankle pain. Denies chest pain, SOB, dizziness, LOC. Laceration noted to nasal bridge.

## 2022-03-13 NOTE — ED PROVIDER NOTE - NS_ ATTENDINGSCRIBEDETAILS _ED_A_ED_FT
I, Cristino Felix MD,  performed the initial face to face bedside interview with this patient regarding history of present illness, review of symptoms and relevant past medical, social and family history.  I completed an independent physical examination.  I was the initial provider who evaluated this patient.   I personally saw the patient and performed a substantive portion of the visit including all aspects of the medical decision making.  The history, relevant review of systems, past medical and surgical history, medical decision making, and physical examination was documented by the scribe in my presence and I attest to the accuracy of the documentation.

## 2022-03-13 NOTE — ED PROVIDER NOTE - CLINICAL SUMMARY MEDICAL DECISION MAKING FREE TEXT BOX
67 y/o female with a PMHx of  HTN, HLD, DM presents to the ED s/p fall while walking with cane. Exam: +laceration to nasal bridge 2cm, +TTP left hip and right knee, +mild TTP left flank. +mild TTP to anterior chest wall. No midline cervical tenderness. Trauma alert called. Will CT and laceration repair. 65 y/o female with a PMHx of  HTN, HLD, DM presents to the ED s/p fall while walking with cane. Exam:  +laceration to nasal bridge 2cm. no ttp of facial bones other then over nasal bridge pelvis stable, +TTP left hip and right knee, +mild TTP left flank. +mild TTP to anterior chest wall. No midline cervical tenderness. Trauma alert called. likely mechanical fall Will CT and laceration repair reassess

## 2022-03-13 NOTE — ED ADULT NURSE NOTE - CHIEF COMPLAINT QUOTE
Pt brought in by ambulance s/p mechanical fall. + facial abrasions and laceration to bridge of nose. Pt denies LOC. Pt  c/o neck pain and left hip pain. Trauma alert called at EMS triage by Dr. Felix [id number]

## 2022-03-13 NOTE — ED PROVIDER NOTE - PROGRESS NOTE DETAILS
spoke with patient again using  #[id number] pt unable to ambulate because she feels like she is too weak. pt re-examined. no midline spinal ttp no saddle anesthesia. pt lives alone at home and normally walks with a cane but feels unable to ambulate. I asked patient again if weakness started prior to fall but she states that she feels like she cant walk because of pain and weakness but did not fall because of weakness. pt will likely need rehab/placement. pt endorsed to Dr. Russo accepts. Cristino Felix M.D., Attending Physician

## 2022-03-13 NOTE — H&P ADULT - NSHPPHYSICALEXAM_GEN_ALL_CORE
· BP Systolic	120 mm Hg  · BP Diastolic	62 mm Hg  · Heart Rate	73 /min  · Respiration Rate (breaths/min)	20 /min  · SpO2 (%)	99 %  · O2 Delivery/Oxygen Delivery Method	room air

## 2022-03-13 NOTE — ED PROVIDER NOTE - NS ED ROS FT
Constitutional: No fever or chills  Eyes: No visual changes  HEENT: No throat pain  CV: No chest pain  Resp: No SOB no cough  GI: No abd pain, nausea or vomiting  : No dysuria  MSK: No musculoskeletal pain  Skin: No rash. +laceration to nose.   Neuro: No headache Constitutional: No fever or chills  Eyes: No visual changes  HEENT: No throat pain  CV: No chest pain  Resp: No SOB no cough  GI: No abd pain, nausea or vomiting  : No dysuria  MSK: +back pain. +shoulder pain. +finger pain. +neck pain.    Skin: No rash. +laceration to nose.   Neuro: No headache Constitutional: No fever or chills  Eyes: No visual changes  HEENT: No throat pain  CV: No chest pain  Resp: No SOB no cough  GI: No abd pain, nausea or vomiting  : No dysuria  MSK: +back pain. +shoulder pain. +leg ain+neck pain.    Skin: No rash. +laceration to nose.   Neuro: No headache

## 2022-03-13 NOTE — ED ADULT TRIAGE NOTE - CHIEF COMPLAINT QUOTE
Pt brought in by ambulance s/p mechanical fall. + facial abrasions and laceration to bridge of nose. Pt denies LOC. Pt  c/o neck pain and left hip pain. Trauma alert called at EMS triage by Dr. Felix [id number]

## 2022-03-13 NOTE — ED PROVIDER NOTE - CARE PLAN
1 Principal Discharge DX:	Nasal fracture  Secondary Diagnosis:	Face lacerations  Secondary Diagnosis:	Unable to walk

## 2022-03-13 NOTE — ED PROVIDER NOTE - PHYSICAL EXAMINATION
Constitutional: NAD AAOx3  Eyes: PERRLA EOMI  Head: Normocephalic atraumatic  ENT: No aguirre sign, no raccoon eyes, no hemotympanum, no csf rhinorrhea, no nasal septal hematoma  Mouth: MMM  Cardiac: regular rate   Resp: Lungs CTAB  GI: Abd s/nt/nd  Neuro: CN2-12 intact GCS 15  Skin: No rashes, no bruising to chest, back, abdomen or extremities +laceration to nasal bridge 2cm,   Msk: No midline spinal ttp, full ROM of neck, c-collar cleared clinically and with provocative testing, no ttp of facial bones, no ttp to chest wall, pelvis stable, full ROM of all extremities without any TTP of extremities , +TTP left hip and right knee, +mild TTP left flank Constitutional: NAD AAOx3  Eyes: PERRLA EOMI  Head: Normocephalic atraumatic  ENT: No aguirre sign, no raccoon eyes, no hemotympanum, no csf rhinorrhea, no nasal septal hematoma  Mouth: MMM  Cardiac: regular rate   Resp: Lungs CTAB  GI: Abd s/nt/nd  Neuro: CN2-12 intact GCS 15  Skin: No rashes, no bruising to chest, back, abdomen or extremities +laceration to nasal bridge 2cm.   Msk: full ROM of neck, no ttp of facial bones, pelvis stable, full ROM of all extremities without any TTP of extremities , +TTP left hip and right knee, +mild TTP left flank. +mild TTP to anterior chest wall. No midline cervical tenderness. Constitutional:mild distressAAOx3  Eyes: PERRLA EOMI  Head: see below  ENT: No aguirre sign, no raccoon eyes,  no csf rhinorrhea, no nasal septal hematoma  Mouth: MMM  Cardiac: regular rate   Resp: Lungs CTAB  GI: Abd s/nt/nd  Neuro: CN2-12 intact GCS 15  Skin:  no bruising to chest, back, abdomen or extremities +laceration to nasal bridge 2cm.   Msk:no ttp of facial bones other then over nasal bridge pelvis stable, +TTP left hip and right knee, +mild TTP left flank. +mild TTP to anterior chest wall. No midline cervical tenderness.

## 2022-03-14 LAB
A1C WITH ESTIMATED AVERAGE GLUCOSE RESULT: 8.5 % — HIGH (ref 4–5.6)
ALBUMIN SERPL ELPH-MCNC: 3.2 G/DL — LOW (ref 3.3–5)
ALP SERPL-CCNC: 81 U/L — SIGNIFICANT CHANGE UP (ref 40–120)
ALT FLD-CCNC: 29 U/L — SIGNIFICANT CHANGE UP (ref 12–78)
ANION GAP SERPL CALC-SCNC: 4 MMOL/L — LOW (ref 5–17)
AST SERPL-CCNC: 17 U/L — SIGNIFICANT CHANGE UP (ref 15–37)
BASOPHILS # BLD AUTO: 0.03 K/UL — SIGNIFICANT CHANGE UP (ref 0–0.2)
BASOPHILS NFR BLD AUTO: 0.4 % — SIGNIFICANT CHANGE UP (ref 0–2)
BILIRUB SERPL-MCNC: 0.3 MG/DL — SIGNIFICANT CHANGE UP (ref 0.2–1.2)
BUN SERPL-MCNC: 19 MG/DL — SIGNIFICANT CHANGE UP (ref 7–23)
CALCIUM SERPL-MCNC: 8.6 MG/DL — SIGNIFICANT CHANGE UP (ref 8.5–10.1)
CHLORIDE SERPL-SCNC: 107 MMOL/L — SIGNIFICANT CHANGE UP (ref 96–108)
CO2 SERPL-SCNC: 28 MMOL/L — SIGNIFICANT CHANGE UP (ref 22–31)
CREAT SERPL-MCNC: 0.76 MG/DL — SIGNIFICANT CHANGE UP (ref 0.5–1.3)
EGFR: 87 ML/MIN/1.73M2 — SIGNIFICANT CHANGE UP
EOSINOPHIL # BLD AUTO: 0.15 K/UL — SIGNIFICANT CHANGE UP (ref 0–0.5)
EOSINOPHIL NFR BLD AUTO: 1.9 % — SIGNIFICANT CHANGE UP (ref 0–6)
ESTIMATED AVERAGE GLUCOSE: 197 MG/DL — HIGH (ref 68–114)
GLUCOSE SERPL-MCNC: 153 MG/DL — HIGH (ref 70–99)
HCT VFR BLD CALC: 34.8 % — SIGNIFICANT CHANGE UP (ref 34.5–45)
HGB BLD-MCNC: 10.9 G/DL — LOW (ref 11.5–15.5)
IMM GRANULOCYTES NFR BLD AUTO: 0.3 % — SIGNIFICANT CHANGE UP (ref 0–1.5)
INR BLD: 1 RATIO — SIGNIFICANT CHANGE UP (ref 0.88–1.16)
LYMPHOCYTES # BLD AUTO: 2.13 K/UL — SIGNIFICANT CHANGE UP (ref 1–3.3)
LYMPHOCYTES # BLD AUTO: 27.3 % — SIGNIFICANT CHANGE UP (ref 13–44)
MCHC RBC-ENTMCNC: 26.5 PG — LOW (ref 27–34)
MCHC RBC-ENTMCNC: 31.3 GM/DL — LOW (ref 32–36)
MCV RBC AUTO: 84.7 FL — SIGNIFICANT CHANGE UP (ref 80–100)
MONOCYTES # BLD AUTO: 0.65 K/UL — SIGNIFICANT CHANGE UP (ref 0–0.9)
MONOCYTES NFR BLD AUTO: 8.3 % — SIGNIFICANT CHANGE UP (ref 2–14)
NEUTROPHILS # BLD AUTO: 4.81 K/UL — SIGNIFICANT CHANGE UP (ref 1.8–7.4)
NEUTROPHILS NFR BLD AUTO: 61.8 % — SIGNIFICANT CHANGE UP (ref 43–77)
PLATELET # BLD AUTO: 219 K/UL — SIGNIFICANT CHANGE UP (ref 150–400)
POTASSIUM SERPL-MCNC: 4.2 MMOL/L — SIGNIFICANT CHANGE UP (ref 3.5–5.3)
POTASSIUM SERPL-SCNC: 4.2 MMOL/L — SIGNIFICANT CHANGE UP (ref 3.5–5.3)
PROT SERPL-MCNC: 6.2 GM/DL — SIGNIFICANT CHANGE UP (ref 6–8.3)
PROTHROM AB SERPL-ACNC: 11.6 SEC — SIGNIFICANT CHANGE UP (ref 10.5–13.4)
RBC # BLD: 4.11 M/UL — SIGNIFICANT CHANGE UP (ref 3.8–5.2)
RBC # FLD: 15.7 % — HIGH (ref 10.3–14.5)
SODIUM SERPL-SCNC: 139 MMOL/L — SIGNIFICANT CHANGE UP (ref 135–145)
WBC # BLD: 7.79 K/UL — SIGNIFICANT CHANGE UP (ref 3.8–10.5)
WBC # FLD AUTO: 7.79 K/UL — SIGNIFICANT CHANGE UP (ref 3.8–10.5)

## 2022-03-14 PROCEDURE — 99232 SBSQ HOSP IP/OBS MODERATE 35: CPT

## 2022-03-14 PROCEDURE — 76376 3D RENDER W/INTRP POSTPROCES: CPT | Mod: 26

## 2022-03-14 PROCEDURE — 73080 X-RAY EXAM OF ELBOW: CPT | Mod: 26,LT

## 2022-03-14 PROCEDURE — 73700 CT LOWER EXTREMITY W/O DYE: CPT | Mod: 26,LT

## 2022-03-14 PROCEDURE — 73610 X-RAY EXAM OF ANKLE: CPT | Mod: 26,LT

## 2022-03-14 RX ORDER — OXYCODONE HYDROCHLORIDE 5 MG/1
5 TABLET ORAL EVERY 4 HOURS
Refills: 0 | Status: DISCONTINUED | OUTPATIENT
Start: 2022-03-14 | End: 2022-03-16

## 2022-03-14 RX ORDER — ENOXAPARIN SODIUM 100 MG/ML
40 INJECTION SUBCUTANEOUS EVERY 24 HOURS
Refills: 0 | Status: DISCONTINUED | OUTPATIENT
Start: 2022-03-14 | End: 2022-03-16

## 2022-03-14 RX ORDER — ACETAMINOPHEN 500 MG
1000 TABLET ORAL ONCE
Refills: 0 | Status: COMPLETED | OUTPATIENT
Start: 2022-03-14 | End: 2022-03-14

## 2022-03-14 RX ORDER — OXYCODONE HYDROCHLORIDE 5 MG/1
10 TABLET ORAL EVERY 4 HOURS
Refills: 0 | Status: DISCONTINUED | OUTPATIENT
Start: 2022-03-14 | End: 2022-03-16

## 2022-03-14 RX ORDER — INFLUENZA VIRUS VACCINE 15; 15; 15; 15 UG/.5ML; UG/.5ML; UG/.5ML; UG/.5ML
0.5 SUSPENSION INTRAMUSCULAR ONCE
Refills: 0 | Status: DISCONTINUED | OUTPATIENT
Start: 2022-03-14 | End: 2022-03-16

## 2022-03-14 RX ORDER — KETOROLAC TROMETHAMINE 30 MG/ML
15 SYRINGE (ML) INJECTION ONCE
Refills: 0 | Status: DISCONTINUED | OUTPATIENT
Start: 2022-03-14 | End: 2022-03-14

## 2022-03-14 RX ADMIN — OXYCODONE HYDROCHLORIDE 10 MILLIGRAM(S): 5 TABLET ORAL at 20:44

## 2022-03-14 RX ADMIN — Medication 650 MILLIGRAM(S): at 14:11

## 2022-03-14 RX ADMIN — INSULIN GLARGINE 10 UNIT(S): 100 INJECTION, SOLUTION SUBCUTANEOUS at 22:07

## 2022-03-14 RX ADMIN — OXYCODONE HYDROCHLORIDE 5 MILLIGRAM(S): 5 TABLET ORAL at 04:54

## 2022-03-14 RX ADMIN — ATORVASTATIN CALCIUM 20 MILLIGRAM(S): 80 TABLET, FILM COATED ORAL at 22:07

## 2022-03-14 RX ADMIN — OXYCODONE HYDROCHLORIDE 5 MILLIGRAM(S): 5 TABLET ORAL at 05:24

## 2022-03-14 RX ADMIN — Medication 400 MILLIGRAM(S): at 22:06

## 2022-03-14 RX ADMIN — OXYCODONE HYDROCHLORIDE 5 MILLIGRAM(S): 5 TABLET ORAL at 09:27

## 2022-03-14 RX ADMIN — OXYCODONE HYDROCHLORIDE 5 MILLIGRAM(S): 5 TABLET ORAL at 10:15

## 2022-03-14 RX ADMIN — OXYCODONE HYDROCHLORIDE 10 MILLIGRAM(S): 5 TABLET ORAL at 19:47

## 2022-03-14 RX ADMIN — AMLODIPINE BESYLATE 5 MILLIGRAM(S): 2.5 TABLET ORAL at 09:26

## 2022-03-14 RX ADMIN — CYCLOBENZAPRINE HYDROCHLORIDE 10 MILLIGRAM(S): 10 TABLET, FILM COATED ORAL at 11:31

## 2022-03-14 RX ADMIN — Medication 50 MILLIGRAM(S): at 09:25

## 2022-03-14 RX ADMIN — PANTOPRAZOLE SODIUM 40 MILLIGRAM(S): 20 TABLET, DELAYED RELEASE ORAL at 05:40

## 2022-03-14 RX ADMIN — Medication 1000 MILLIGRAM(S): at 22:20

## 2022-03-14 RX ADMIN — Medication 1: at 16:42

## 2022-03-14 RX ADMIN — Medication 15 MILLIGRAM(S): at 01:24

## 2022-03-14 RX ADMIN — Medication 3 MILLIGRAM(S): at 22:07

## 2022-03-14 RX ADMIN — Medication 15 MILLIGRAM(S): at 00:54

## 2022-03-14 RX ADMIN — SODIUM CHLORIDE 100 MILLILITER(S): 9 INJECTION INTRAMUSCULAR; INTRAVENOUS; SUBCUTANEOUS at 18:10

## 2022-03-14 RX ADMIN — Medication 1: at 11:29

## 2022-03-14 RX ADMIN — SENNA PLUS 2 TABLET(S): 8.6 TABLET ORAL at 22:07

## 2022-03-14 RX ADMIN — Medication 1: at 07:31

## 2022-03-14 RX ADMIN — ENOXAPARIN SODIUM 40 MILLIGRAM(S): 100 INJECTION SUBCUTANEOUS at 14:11

## 2022-03-14 NOTE — PATIENT PROFILE ADULT - BILL PAYMENT
REASON FOR VISIT:    Anup Monae is a 31year old female who presents for an [address].       , monogamous- condoms only  G0  menses: regular q 30 days x 4-5 days  Last pap: initial today  History of abnormal pap: n/a  On vit D Not at all    PHQ-2 SCORE: 0        PREVENTATIVE SERVICES  INDICATIONS AND SCHEDULE Recommendation Internal Lab or Procedure External Lab or Procedure   Breast Cancer Screening   Every 2 yrs age 54-69 There are no preventive care reminders to display for t Annually No results found for: CREATSERUM No flowsheet data found. Digoxin Serum Conc  Annually No results found for: DIGOXIN No flowsheet data found. Diabetes      HgbA1C  Annually No results found for: A1C No flowsheet data found.     Creat/alb rati anxiety  HEMATOLOGIC: denies hx of anemia  ENDOCRINE: denies thyroid history  ALL/ASTHMA: denies hx of allergy or asthma    EXAM:   /74 (BP Location: Right arm, Patient Position: Sitting, Cuff Size: adult)   Pulse 78   Temp 98.4 °F (36.9 °C) (Oral) calcium daily for osteoporosis prevention discussed  -thin prep pap recommended every 3 years due 12/7/18  - will have lumpectomy right breast of fibroadenoma due to size with Dr. Mian Torres    - Casandra Thomas;  Future  - COMP METABOLIC PA no

## 2022-03-14 NOTE — PROGRESS NOTE ADULT - SUBJECTIVE AND OBJECTIVE BOX
PCP: Dr LUPIS hayes    Patient is a 66y old  Female who presents with a chief complaint of Mechanical Fall  Difficulty ambulating  Weakness (13 Mar 2022 19:01)        HPI:  66 year old female patient with pmh: HTN, HLD, Dm, type 2, who currently ambulates at baseline with a cane and history noted for prior admission for difficulty ambulating presented to the ED after a mechanical fall. Patient endorses ambulating with her cane when she felt the sudden feeling as though she would fall- patient then fell forward. Denies any preceding dizziness, headache, chest pain, palpitations or pre-syncope.. Patient has not fallen in over a year. Patient endorses mild lower back pain and b/l hand pain since fall. Denies any loss of consciousness as a result of fall.In the ED patient found to have Nasal bone fracture. A nasal laceration was repaired by ED provider. (13 Mar 2022 19:01)    3/14: seen OOB chair post PT, c/o posterior head pain, Left lower back pain and Left ankle pain, nasal pain, no cp or sob, cecy po    Review of system- Rest of the review of system are normal except mentioned in HPI      T(C): 36.8 (03-14-22 @ 09:00), Max: 36.8 (03-13-22 @ 19:51)  HR: 70 (03-14-22 @ 09:00) (68 - 73)  BP: 112/34 (03-14-22 @ 09:00) (112/34 - 123/41)  RR: 18 (03-14-22 @ 09:00) (18 - 20)  SpO2: 97% (03-14-22 @ 09:00) (97% - 99%)  Wt(kg): --          PHYSICAL EXAM:    GENERAL: Comfortable, no acute distress   HEAD:  Normocephalic, atraumatic  EYES: EOMI, PERRLA  HEENT: Moist mucous membranes  NECK: Supple, No JVD  NERVOUS SYSTEM:  Alert & Oriented X3, Motor Strength 5/5 B/L upper and lower extremities  CHEST/LUNG: Clear to auscultation bilaterally  HEART: Regular rate and rhythm  ABDOMEN: Soft, non tender, Nondistended, Bowel sounds present  GENITOURINARY: Voiding, no palpable bladder  EXTREMITIES:   No clubbing, cyanosis, or edema  MUSCULOSKELETAL- + Left lower back pain, left ankle pain  SKIN-no rash      LABS:                        10.9   7.79  )-----------( 219      ( 14 Mar 2022 09:14 )             34.8     03-14    139  |  107  |  19  ----------------------------<  153<H>  4.2   |  28  |  0.76    Ca    8.6      14 Mar 2022 09:14    TPro  6.2  /  Alb  3.2<L>  /  TBili  0.3  /  DBili  x   /  AST  17  /  ALT  29  /  AlkPhos  81  03-14    PT/INR - ( 14 Mar 2022 09:14 )   PT: 11.6 sec;   INR: 1.00 ratio             CAPILLARY BLOOD GLUCOSE      POCT Blood Glucose.: 194 mg/dL (14 Mar 2022 11:26)  POCT Blood Glucose.: 159 mg/dL (14 Mar 2022 07:25)  POCT Blood Glucose.: 235 mg/dL (13 Mar 2022 23:18)        RADIOLOGY & ADDITIONAL TESTS:    < from: Xray Knee 3 Views, Right (03.13.22 @ 16:02) >    ACC: [id number] EXAM:  XR KNEE 3 VIEWS RT                          PROCEDURE DATE:  03/13/2022          INTERPRETATION:  HISTORY:  Fall. Rule out fracture    TECHNIQUE:  AP, oblique and lateral views of the right knee were obtained.    FINDINGS:  There is no evidence of acute fracture. There is mild medial   tibiofemoral joint space narrowing. Osteophytes are noted in the lateral   tibiofemoral compartment. Mild narrowing and osteophytes are noted at the   patellofemoral, compartment. There is nodefinite suprapatellar effusion.   There is no soft tissue abnormality.    IMPRESSION:  No fracture of the right knee  < from: Xray Femur 2 Views, Left (03.13.22 @ 16:02) >    ACC: [id number] EXAM:  XR FEMUR 2 VIEWS LT                        ACC: [id number] EXAM:  XR HIP WITH PELV MIN 4V LT                          PROCEDURE DATE:  03/13/2022          INTERPRETATION:  HISTORY:  Fall. Rule out fracture    TECHNIQUE:  An AP view of the pelvis and cone-down and lateral views of   the left hip, AP and lateral views of the left femur were obtained    FINDINGS:  There is no fracture of the pelvis. The SI joints are   symmetric. Contrast is noted in the distal ureters and bladder.    There is mild arthrosis of the left hip manifest by mild joint space   narrowing and osteophytes. There is no evidence of hip fracture or   dislocation. There are no lytic or blastic lesions in the left femur. No   soft tissue abnormalities arenoted. Arthrosis is also seen in the right   hip. The soft tissues are unremarkable. Degenerative changes are noted in   the lower lumbar spine.    IMPRESSION:  No fracture of the pelvis, left hip or left femur.  < from: CT Abdomen and Pelvis w/ IV Cont (03.13.22 @ 15:42) >    ACC: [id number] EXAM:  CT ABDOMEN AND PELVIS IC                        ACC: [id number] EXAM:  CT CHEST IC                          PROCEDURE DATE:  03/13/2022          INTERPRETATION:  CLINICAL INFORMATION: Trauma.    COMPARISON: 9/17/2016.    CONTRAST/COMPLICATIONS:  IV Contrast: Omnipaque 350 90 cc administered   10 cc discarded  Oral Contrast: NONE  Complications: None reported at time of study completion    PROCEDURE:  CT of the Chest, Abdomen and Pelvis was performed.  Imaging was performed through the chest in the arterial phase followed by   imaging of the abdomen and pelvis in the portal venous phase.  Sagittal and coronal reformats were performed.    FINDINGS:  CHEST:  LUNGS AND LARGE AIRWAYS: Patent central airways. No pulmonary nodules.  PLEURA: No pleural effusion.  VESSELS: Atherosclerotic changes of the aorta and coronary arteries.  HEART: Heart size is normal. No pericardial effusion.  MEDIASTINUM AND ANA: No lymphadenopathy.  CHEST WALL AND LOWER NECK: Within normal limits.    ABDOMEN AND PELVIS:  LIVER: Within normal limits.  BILE DUCTS: Normal caliber.  GALLBLADDER: Cholelithiasis.  SPLEEN: Within normal limits.  PANCREAS: Within normal limits.  ADRENALS: Within normal limits.  KIDNEYS/URETERS: Within normal limits.    BLADDER: Within normal limits.  REPRODUCTIVE ORGANS: Uterus and adnexa within normal limits.    BOWEL: No bowel obstruction. Appendix is normal.  PERITONEUM: No ascites.  VESSELS: Atherosclerotic changes.  RETROPERITONEUM/LYMPH NODES: No lymphadenopathy.  ABDOMINAL WALL: Within normal limits.  BONES: Degenerative changes.    IMPRESSION:  No acute abnormality in the chest, abdomen and pelvis.      < from: CT Head No Cont (03.13.22 @ 15:36) >    ACC: [id number] EXAM:  CT MAXILLOFACIAL                        ACC: [id number] EXAM:  CT CERVICAL SPINE                        ACC: [id number] EXAM:  CT BRAIN                        ACC: [id number] [facility]    PROCEDURE DATE:  03/13/2022          INTERPRETATION:  CT HEAD, CT 3D RECONSTRUCTION WO WORKSTATION, CT   CERVICAL SPINE, CT MAXILLOFACIAL    INDICATIONS: trauma alert, brought in by ambulance s/p mechanical fall. +   facial abrasions and laceration to bridge of nose. Pt denies LOC. Pt c/o   neck pain and left hip pain.    CT BRAIN:    TECHNIQUE:  Multiple contiguous axial images were obtained from the skull   base to the vertex without the use of intravenous contrast.    COMPARISON EXAMINATION: None available at this time.    FINDINGS:  Ventricles and sulci: Parenchymal volume loss is present which is   commensurate with patient age.  Intra-axial: There are hemispheric white matter areas of low attenuation   which are nonspecific but likely related to sequelae of microvascular   disease.  No intracranial mass, acute hemorrhage, or significant midline shift is   present.  Extra-axial: There is no extra-axial collection.  Visualized sinuses: No air-fluid levels are identified. Clear.  Visualized mastoids:  Clear.  Calvarium: Unremarkable.  Miscellaneous:  Bilateral cataract surgery.    Impression: See below    ===========================================================================  ===========    MAXILLOFACIAL CT:    TECHNIQUE: This examination consists of axial 1.25 mm sections from the   frontal sinuses through the mandible.  The study was supplemented with   coronal and sagittal reformatted images.  Dedicated 3-D images were made   using dedicated software and viewed on adedicated 3-D workstation in   multiple planes.    Intravenous contrast was not administered.    FINDINGS:    Nasal bone fracture.    The visualized sinuses demonstrate no evidence of opacification or   mucosal thickening.    The nasopharynx is normal in appearance.    The parotid glands are normal and symmetric in appearance.    The  space is normal bilaterally.    Parapharyngeal space is normal bilaterally.    The tongue base is normal in appearance.    The epiglottis is normal in thickness and appearance.    [place] tonsils are normal in appearance.    The submandibular glands are normal and symmetric in appearance   bilaterally.    The platysma is normal in thickness.    Sternocleidomastoid muscles are normal and symmetric in appearance   bilaterally where visualized.    Anterior strap muscles are unremarkable where visualized.    Aberrant bilateral retropharyngeal course of carotid arteries with mild   bilateral calcification.    No suspicious adenopathy.      IMPRESSION: See below    ===========================================================================  ===========    CT CERVICAL SPINE:    TECHNIQUE:  Axial images were obtained through the cervical spine using   multislice helical technique.  Reformatted coronaland sagittal images   were performed.    COMPARISON EXAMINATION:  None available at this time.    FINDINGS:  On the sagittal reformations, there is no prevertebral soft tissue   swelling. There is no splaying of the spinous processes. Straightening of   the normal lordotic curvature.  On the coronal reformations, occipital condyles are normal. Lateral   masses of C1 align normally with C2.  On the axial images, no lucent fracture line is identified.    Multilevel degenerative osteoarthritis is present. Findings include   marginal osteophytes, uncovertebral spurring, and facet joint space   compartment narrowing with subchondral sclerosis and hypertrophic   osteophytes at multiple levels. There is multilevel degenerative disc   disease. Findings include loss of normal disc space height and endplate   sclerosis.    Moderate right-sided canal stenosis from disc osteophyte complex at C5-6.    Miscellaneous:  None.    IMPRESSIONS:    Head CT: No CT evidence of acute intracranial hemorrhage.    Maxillofacial CT: Nasal bone fracture.    C-spine CT:  No acute fracture.      MEDICATIONS  (STANDING):  acetaminophen   IVPB .. 1000 milliGRAM(s) IV Intermittent once  amLODIPine   Tablet 5 milliGRAM(s) Oral daily  atorvastatin 20 milliGRAM(s) Oral at bedtime  dextrose 40% Gel 15 Gram(s) Oral once  dextrose 5%. 1000 milliLiter(s) (50 mL/Hr) IV Continuous <Continuous>  dextrose 5%. 1000 milliLiter(s) (100 mL/Hr) IV Continuous <Continuous>  dextrose 50% Injectable 25 Gram(s) IV Push once  dextrose 50% Injectable 12.5 Gram(s) IV Push once  dextrose 50% Injectable 25 Gram(s) IV Push once  glucagon  Injectable 1 milliGRAM(s) IntraMuscular once  influenza   Vaccine 0.5 milliLiter(s) IntraMuscular once  insulin glargine SubCutaneous Injection (LANTUS) - Peds 10 Unit(s) SubCutaneous at bedtime  insulin lispro (ADMELOG) corrective regimen sliding scale   SubCutaneous three times a day before meals  metoprolol succinate ER 50 milliGRAM(s) Oral daily  pantoprazole    Tablet 40 milliGRAM(s) Oral before breakfast  senna 2 Tablet(s) Oral at bedtime  sodium chloride 0.9%. 1000 milliLiter(s) (100 mL/Hr) IV Continuous <Continuous>    MEDICATIONS  (PRN):  acetaminophen     Tablet .. 650 milliGRAM(s) Oral every 6 hours PRN Temp greater or equal to 38C (100.4F), Mild Pain (1 - 3)  aluminum hydroxide/magnesium hydroxide/simethicone Suspension 30 milliLiter(s) Oral every 4 hours PRN Dyspepsia  bisacodyl Suppository 10 milliGRAM(s) Rectal daily PRN Constipation  cyclobenzaprine 10 milliGRAM(s) Oral three times a day PRN Muscle Spasm  melatonin 3 milliGRAM(s) Oral at bedtime PRN Insomnia  ondansetron Injectable 4 milliGRAM(s) IV Push every 8 hours PRN Nausea and/or Vomiting  oxyCODONE    IR 10 milliGRAM(s) Oral every 4 hours PRN Severe Pain (7 - 10)  oxyCODONE    IR 5 milliGRAM(s) Oral every 4 hours PRN Moderate Pain (4 - 6)  polyethylene glycol 3350 17 Gram(s) Oral daily PRN Constipation    Pt is a 64y/o  woman with above PMHx a/w:    #  Acute on chronic lower back pain, sciatica, Inability to ambulate  -pt eval  - better pain control with Oxycodone 5/10, muscle relaxers  -bowel regimen  -CCD diet    # DM   -HB A1c 8.5   - Hold oral hypoglycemics   - C/w Lantus, and ISS       #. HTN  - cont meds: on amlodipine and Metoprolol     # HLD  - c/w Lipitor     #. B/l calf tenderness  -  Dopplers: neg for  DVT     # GERD  PPI    # Mild leukocytosis, reactive   resolved      Dispo: home with home PT when pain controlled   PCP: Dr LUPIS hayes    Patient is a 66y old  Female who presents with a chief complaint of Mechanical Fall  Difficulty ambulating  Weakness (13 Mar 2022 19:01)        HPI:  66 year old female patient with pmh: HTN, HLD, Dm, type 2, who currently ambulates at baseline with a cane and history noted for prior admission for difficulty ambulating presented to the ED after a mechanical fall. Patient endorses ambulating with her cane when she felt the sudden feeling as though she would fall- patient then fell forward. Denies any preceding dizziness, headache, chest pain, palpitations or pre-syncope.. Patient has not fallen in over a year. Patient endorses mild lower back pain and b/l hand pain since fall. Denies any loss of consciousness as a result of fall.In the ED patient found to have Nasal bone fracture. A nasal laceration was repaired by ED provider. (13 Mar 2022 19:01)    3/14: seen OOB chair post PT, c/o posterior head pain, Left lower back pain and Left ankle pain, nasal pain, no cp or sob, cecy po    Review of system- Rest of the review of system are normal except mentioned in HPI      T(C): 36.8 (03-14-22 @ 09:00), Max: 36.8 (03-13-22 @ 19:51)  HR: 70 (03-14-22 @ 09:00) (68 - 73)  BP: 112/34 (03-14-22 @ 09:00) (112/34 - 123/41)  RR: 18 (03-14-22 @ 09:00) (18 - 20)  SpO2: 97% (03-14-22 @ 09:00) (97% - 99%)  Wt(kg): --          PHYSICAL EXAM:    GENERAL: Comfortable, no acute distress   HEAD:  Normocephalic, atraumatic  EYES: EOMI, PERRLA  HEENT: Moist mucous membranes  NECK: Supple, No JVD  NERVOUS SYSTEM:  Alert & Oriented X3, Motor Strength 5/5 B/L upper and lower extremities  CHEST/LUNG: Clear to auscultation bilaterally  HEART: Regular rate and rhythm  ABDOMEN: Soft, non tender, Nondistended, Bowel sounds present  GENITOURINARY: Voiding, no palpable bladder  EXTREMITIES:   No clubbing, cyanosis, or edema  MUSCULOSKELETAL- + Left lower back pain, left ankle pain  SKIN-no rash      LABS:                        10.9   7.79  )-----------( 219      ( 14 Mar 2022 09:14 )             34.8     03-14    139  |  107  |  19  ----------------------------<  153<H>  4.2   |  28  |  0.76    Ca    8.6      14 Mar 2022 09:14    TPro  6.2  /  Alb  3.2<L>  /  TBili  0.3  /  DBili  x   /  AST  17  /  ALT  29  /  AlkPhos  81  03-14    PT/INR - ( 14 Mar 2022 09:14 )   PT: 11.6 sec;   INR: 1.00 ratio             CAPILLARY BLOOD GLUCOSE      POCT Blood Glucose.: 194 mg/dL (14 Mar 2022 11:26)  POCT Blood Glucose.: 159 mg/dL (14 Mar 2022 07:25)  POCT Blood Glucose.: 235 mg/dL (13 Mar 2022 23:18)        RADIOLOGY & ADDITIONAL TESTS:    < from: Xray Knee 3 Views, Right (03.13.22 @ 16:02) >    ACC: [id number] EXAM:  XR KNEE 3 VIEWS RT                          PROCEDURE DATE:  03/13/2022          INTERPRETATION:  HISTORY:  Fall. Rule out fracture    TECHNIQUE:  AP, oblique and lateral views of the right knee were obtained.    FINDINGS:  There is no evidence of acute fracture. There is mild medial   tibiofemoral joint space narrowing. Osteophytes are noted in the lateral   tibiofemoral compartment. Mild narrowing and osteophytes are noted at the   patellofemoral, compartment. There is nodefinite suprapatellar effusion.   There is no soft tissue abnormality.    IMPRESSION:  No fracture of the right knee  < from: Xray Femur 2 Views, Left (03.13.22 @ 16:02) >    ACC: [id number] EXAM:  XR FEMUR 2 VIEWS LT                        ACC: [id number] EXAM:  XR HIP WITH PELV MIN 4V LT                          PROCEDURE DATE:  03/13/2022          INTERPRETATION:  HISTORY:  Fall. Rule out fracture    TECHNIQUE:  An AP view of the pelvis and cone-down and lateral views of   the left hip, AP and lateral views of the left femur were obtained    FINDINGS:  There is no fracture of the pelvis. The SI joints are   symmetric. Contrast is noted in the distal ureters and bladder.    There is mild arthrosis of the left hip manifest by mild joint space   narrowing and osteophytes. There is no evidence of hip fracture or   dislocation. There are no lytic or blastic lesions in the left femur. No   soft tissue abnormalities arenoted. Arthrosis is also seen in the right   hip. The soft tissues are unremarkable. Degenerative changes are noted in   the lower lumbar spine.    IMPRESSION:  No fracture of the pelvis, left hip or left femur.  < from: CT Abdomen and Pelvis w/ IV Cont (03.13.22 @ 15:42) >    ACC: [id number] EXAM:  CT ABDOMEN AND PELVIS IC                        ACC: [id number] EXAM:  CT CHEST IC                          PROCEDURE DATE:  03/13/2022          INTERPRETATION:  CLINICAL INFORMATION: Trauma.    COMPARISON: 9/17/2016.    CONTRAST/COMPLICATIONS:  IV Contrast: Omnipaque 350 90 cc administered   10 cc discarded  Oral Contrast: NONE  Complications: None reported at time of study completion    PROCEDURE:  CT of the Chest, Abdomen and Pelvis was performed.  Imaging was performed through the chest in the arterial phase followed by   imaging of the abdomen and pelvis in the portal venous phase.  Sagittal and coronal reformats were performed.    FINDINGS:  CHEST:  LUNGS AND LARGE AIRWAYS: Patent central airways. No pulmonary nodules.  PLEURA: No pleural effusion.  VESSELS: Atherosclerotic changes of the aorta and coronary arteries.  HEART: Heart size is normal. No pericardial effusion.  MEDIASTINUM AND ANA: No lymphadenopathy.  CHEST WALL AND LOWER NECK: Within normal limits.    ABDOMEN AND PELVIS:  LIVER: Within normal limits.  BILE DUCTS: Normal caliber.  GALLBLADDER: Cholelithiasis.  SPLEEN: Within normal limits.  PANCREAS: Within normal limits.  ADRENALS: Within normal limits.  KIDNEYS/URETERS: Within normal limits.    BLADDER: Within normal limits.  REPRODUCTIVE ORGANS: Uterus and adnexa within normal limits.    BOWEL: No bowel obstruction. Appendix is normal.  PERITONEUM: No ascites.  VESSELS: Atherosclerotic changes.  RETROPERITONEUM/LYMPH NODES: No lymphadenopathy.  ABDOMINAL WALL: Within normal limits.  BONES: Degenerative changes.    IMPRESSION:  No acute abnormality in the chest, abdomen and pelvis.      < from: CT Head No Cont (03.13.22 @ 15:36) >    ACC: [id number] EXAM:  CT MAXILLOFACIAL                        ACC: [id number] EXAM:  CT CERVICAL SPINE                        ACC: [id number] EXAM:  CT BRAIN                        ACC: [id number] [facility]    PROCEDURE DATE:  03/13/2022          INTERPRETATION:  CT HEAD, CT 3D RECONSTRUCTION WO WORKSTATION, CT   CERVICAL SPINE, CT MAXILLOFACIAL    INDICATIONS: trauma alert, brought in by ambulance s/p mechanical fall. +   facial abrasions and laceration to bridge of nose. Pt denies LOC. Pt c/o   neck pain and left hip pain.    CT BRAIN:    TECHNIQUE:  Multiple contiguous axial images were obtained from the skull   base to the vertex without the use of intravenous contrast.    COMPARISON EXAMINATION: None available at this time.    FINDINGS:  Ventricles and sulci: Parenchymal volume loss is present which is   commensurate with patient age.  Intra-axial: There are hemispheric white matter areas of low attenuation   which are nonspecific but likely related to sequelae of microvascular   disease.  No intracranial mass, acute hemorrhage, or significant midline shift is   present.  Extra-axial: There is no extra-axial collection.  Visualized sinuses: No air-fluid levels are identified. Clear.  Visualized mastoids:  Clear.  Calvarium: Unremarkable.  Miscellaneous:  Bilateral cataract surgery.    Impression: See below    ===========================================================================  ===========    MAXILLOFACIAL CT:    TECHNIQUE: This examination consists of axial 1.25 mm sections from the   frontal sinuses through the mandible.  The study was supplemented with   coronal and sagittal reformatted images.  Dedicated 3-D images were made   using dedicated software and viewed on adedicated 3-D workstation in   multiple planes.    Intravenous contrast was not administered.    FINDINGS:    Nasal bone fracture.    The visualized sinuses demonstrate no evidence of opacification or   mucosal thickening.    The nasopharynx is normal in appearance.    The parotid glands are normal and symmetric in appearance.    The  space is normal bilaterally.    Parapharyngeal space is normal bilaterally.    The tongue base is normal in appearance.    The epiglottis is normal in thickness and appearance.    [place] tonsils are normal in appearance.    The submandibular glands are normal and symmetric in appearance   bilaterally.    The platysma is normal in thickness.    Sternocleidomastoid muscles are normal and symmetric in appearance   bilaterally where visualized.    Anterior strap muscles are unremarkable where visualized.    Aberrant bilateral retropharyngeal course of carotid arteries with mild   bilateral calcification.    No suspicious adenopathy.      IMPRESSION: See below    ===========================================================================  ===========    CT CERVICAL SPINE:    TECHNIQUE:  Axial images were obtained through the cervical spine using   multislice helical technique.  Reformatted coronaland sagittal images   were performed.    COMPARISON EXAMINATION:  None available at this time.    FINDINGS:  On the sagittal reformations, there is no prevertebral soft tissue   swelling. There is no splaying of the spinous processes. Straightening of   the normal lordotic curvature.  On the coronal reformations, occipital condyles are normal. Lateral   masses of C1 align normally with C2.  On the axial images, no lucent fracture line is identified.    Multilevel degenerative osteoarthritis is present. Findings include   marginal osteophytes, uncovertebral spurring, and facet joint space   compartment narrowing with subchondral sclerosis and hypertrophic   osteophytes at multiple levels. There is multilevel degenerative disc   disease. Findings include loss of normal disc space height and endplate   sclerosis.    Moderate right-sided canal stenosis from disc osteophyte complex at C5-6.    Miscellaneous:  None.    IMPRESSIONS:    Head CT: No CT evidence of acute intracranial hemorrhage.    Maxillofacial CT: Nasal bone fracture.    C-spine CT:  No acute fracture.      MEDICATIONS  (STANDING):  acetaminophen   IVPB .. 1000 milliGRAM(s) IV Intermittent once  amLODIPine   Tablet 5 milliGRAM(s) Oral daily  atorvastatin 20 milliGRAM(s) Oral at bedtime  dextrose 40% Gel 15 Gram(s) Oral once  dextrose 5%. 1000 milliLiter(s) (50 mL/Hr) IV Continuous <Continuous>  dextrose 5%. 1000 milliLiter(s) (100 mL/Hr) IV Continuous <Continuous>  dextrose 50% Injectable 25 Gram(s) IV Push once  dextrose 50% Injectable 12.5 Gram(s) IV Push once  dextrose 50% Injectable 25 Gram(s) IV Push once  glucagon  Injectable 1 milliGRAM(s) IntraMuscular once  influenza   Vaccine 0.5 milliLiter(s) IntraMuscular once  insulin glargine SubCutaneous Injection (LANTUS) - Peds 10 Unit(s) SubCutaneous at bedtime  insulin lispro (ADMELOG) corrective regimen sliding scale   SubCutaneous three times a day before meals  metoprolol succinate ER 50 milliGRAM(s) Oral daily  pantoprazole    Tablet 40 milliGRAM(s) Oral before breakfast  senna 2 Tablet(s) Oral at bedtime  sodium chloride 0.9%. 1000 milliLiter(s) (100 mL/Hr) IV Continuous <Continuous>    MEDICATIONS  (PRN):  acetaminophen     Tablet .. 650 milliGRAM(s) Oral every 6 hours PRN Temp greater or equal to 38C (100.4F), Mild Pain (1 - 3)  aluminum hydroxide/magnesium hydroxide/simethicone Suspension 30 milliLiter(s) Oral every 4 hours PRN Dyspepsia  bisacodyl Suppository 10 milliGRAM(s) Rectal daily PRN Constipation  cyclobenzaprine 10 milliGRAM(s) Oral three times a day PRN Muscle Spasm  melatonin 3 milliGRAM(s) Oral at bedtime PRN Insomnia  ondansetron Injectable 4 milliGRAM(s) IV Push every 8 hours PRN Nausea and/or Vomiting  oxyCODONE    IR 10 milliGRAM(s) Oral every 4 hours PRN Severe Pain (7 - 10)  oxyCODONE    IR 5 milliGRAM(s) Oral every 4 hours PRN Moderate Pain (4 - 6)  polyethylene glycol 3350 17 Gram(s) Oral daily PRN Constipation    Pt is a 64y/o  woman with above PMHx a/w:    #  Acute on chronic lower back pain, sciatica, Inability to ambulate  -pt eval  - better pain control with Oxycodone 5/10, muscle relaxers  -bowel regimen  -CCD diet    # DM   -HB A1c 8.5   - Hold oral hypoglycemics   - C/w Lantus, and ISS       #. HTN  - cont meds: on amlodipine and Metoprolol     # HLD  - c/w Lipitor     #. B/l calf tenderness  -  Dopplers: neg for  DVT     # GERD  PPI    # Mild leukocytosis, reactive   resolved    #VTE prophylaxis  lovenox  venodynes  mobilize      Dispo: home with home PT when pain controlled   PCP: Dr LUPIS haeys    Patient is a 66y old  Female who presents with a chief complaint of Mechanical Fall  Difficulty ambulating  Weakness (13 Mar 2022 19:01)        HPI:  66 year old female patient with pmh: HTN, HLD, Dm, type 2, who currently ambulates at baseline with a cane and history noted for prior admission for difficulty ambulating presented to the ED after a mechanical fall. Patient endorses ambulating with her cane when she felt the sudden feeling as though she would fall- patient then fell forward. Denies any preceding dizziness, headache, chest pain, palpitations or pre-syncope.. Patient has not fallen in over a year. Patient endorses mild lower back pain and b/l hand pain since fall. Denies any loss of consciousness as a result of fall.In the ED patient found to have Nasal bone fracture. A nasal laceration was repaired by ED provider. (13 Mar 2022 19:01)    3/14: seen OOB chair post PT, c/o posterior head pain, Left lower back pain and Left ankle pain, nasal pain, no cp or sob, cecy po    Review of system- Rest of the review of system are normal except mentioned in HPI      T(C): 36.8 (03-14-22 @ 09:00), Max: 36.8 (03-13-22 @ 19:51)  HR: 70 (03-14-22 @ 09:00) (68 - 73)  BP: 112/34 (03-14-22 @ 09:00) (112/34 - 123/41)  RR: 18 (03-14-22 @ 09:00) (18 - 20)  SpO2: 97% (03-14-22 @ 09:00) (97% - 99%)  Wt(kg): --          PHYSICAL EXAM:    GENERAL: Comfortable, no acute distress   HEAD:  Normocephalic, atraumatic  EYES: EOMI, PERRLA  HEENT: Moist mucous membranes  NECK: Supple, No JVD  NERVOUS SYSTEM:  Alert & Oriented X3, Motor Strength 5/5 B/L upper and lower extremities  CHEST/LUNG: Clear to auscultation bilaterally  HEART: Regular rate and rhythm  ABDOMEN: Soft, non tender, Nondistended, Bowel sounds present  GENITOURINARY: Voiding, no palpable bladder  EXTREMITIES:   No clubbing, cyanosis, or edema  MUSCULOSKELETAL- + Left lower back pain, left ankle pain  SKIN-no rash      LABS:                        10.9   7.79  )-----------( 219      ( 14 Mar 2022 09:14 )             34.8     03-14    139  |  107  |  19  ----------------------------<  153<H>  4.2   |  28  |  0.76    Ca    8.6      14 Mar 2022 09:14    TPro  6.2  /  Alb  3.2<L>  /  TBili  0.3  /  DBili  x   /  AST  17  /  ALT  29  /  AlkPhos  81  03-14    PT/INR - ( 14 Mar 2022 09:14 )   PT: 11.6 sec;   INR: 1.00 ratio             CAPILLARY BLOOD GLUCOSE      POCT Blood Glucose.: 194 mg/dL (14 Mar 2022 11:26)  POCT Blood Glucose.: 159 mg/dL (14 Mar 2022 07:25)  POCT Blood Glucose.: 235 mg/dL (13 Mar 2022 23:18)        RADIOLOGY & ADDITIONAL TESTS:    < from: Xray Knee 3 Views, Right (03.13.22 @ 16:02) >    ACC: [id number] EXAM:  XR KNEE 3 VIEWS RT                          PROCEDURE DATE:  03/13/2022          INTERPRETATION:  HISTORY:  Fall. Rule out fracture    TECHNIQUE:  AP, oblique and lateral views of the right knee were obtained.    FINDINGS:  There is no evidence of acute fracture. There is mild medial   tibiofemoral joint space narrowing. Osteophytes are noted in the lateral   tibiofemoral compartment. Mild narrowing and osteophytes are noted at the   patellofemoral, compartment. There is nodefinite suprapatellar effusion.   There is no soft tissue abnormality.    IMPRESSION:  No fracture of the right knee  < from: Xray Femur 2 Views, Left (03.13.22 @ 16:02) >    ACC: [id number] EXAM:  XR FEMUR 2 VIEWS LT                        ACC: [id number] EXAM:  XR HIP WITH PELV MIN 4V LT                          PROCEDURE DATE:  03/13/2022          INTERPRETATION:  HISTORY:  Fall. Rule out fracture    TECHNIQUE:  An AP view of the pelvis and cone-down and lateral views of   the left hip, AP and lateral views of the left femur were obtained    FINDINGS:  There is no fracture of the pelvis. The SI joints are   symmetric. Contrast is noted in the distal ureters and bladder.    There is mild arthrosis of the left hip manifest by mild joint space   narrowing and osteophytes. There is no evidence of hip fracture or   dislocation. There are no lytic or blastic lesions in the left femur. No   soft tissue abnormalities arenoted. Arthrosis is also seen in the right   hip. The soft tissues are unremarkable. Degenerative changes are noted in   the lower lumbar spine.    IMPRESSION:  No fracture of the pelvis, left hip or left femur.  < from: CT Abdomen and Pelvis w/ IV Cont (03.13.22 @ 15:42) >    ACC: [id number] EXAM:  CT ABDOMEN AND PELVIS IC                        ACC: [id number] EXAM:  CT CHEST IC                          PROCEDURE DATE:  03/13/2022          INTERPRETATION:  CLINICAL INFORMATION: Trauma.    COMPARISON: 9/17/2016.    CONTRAST/COMPLICATIONS:  IV Contrast: Omnipaque 350 90 cc administered   10 cc discarded  Oral Contrast: NONE  Complications: None reported at time of study completion    PROCEDURE:  CT of the Chest, Abdomen and Pelvis was performed.  Imaging was performed through the chest in the arterial phase followed by   imaging of the abdomen and pelvis in the portal venous phase.  Sagittal and coronal reformats were performed.    FINDINGS:  CHEST:  LUNGS AND LARGE AIRWAYS: Patent central airways. No pulmonary nodules.  PLEURA: No pleural effusion.  VESSELS: Atherosclerotic changes of the aorta and coronary arteries.  HEART: Heart size is normal. No pericardial effusion.  MEDIASTINUM AND ANA: No lymphadenopathy.  CHEST WALL AND LOWER NECK: Within normal limits.    ABDOMEN AND PELVIS:  LIVER: Within normal limits.  BILE DUCTS: Normal caliber.  GALLBLADDER: Cholelithiasis.  SPLEEN: Within normal limits.  PANCREAS: Within normal limits.  ADRENALS: Within normal limits.  KIDNEYS/URETERS: Within normal limits.    BLADDER: Within normal limits.  REPRODUCTIVE ORGANS: Uterus and adnexa within normal limits.    BOWEL: No bowel obstruction. Appendix is normal.  PERITONEUM: No ascites.  VESSELS: Atherosclerotic changes.  RETROPERITONEUM/LYMPH NODES: No lymphadenopathy.  ABDOMINAL WALL: Within normal limits.  BONES: Degenerative changes.    IMPRESSION:  No acute abnormality in the chest, abdomen and pelvis.      < from: CT Head No Cont (03.13.22 @ 15:36) >    ACC: [id number] EXAM:  CT MAXILLOFACIAL                        ACC: [id number] EXAM:  CT CERVICAL SPINE                        ACC: [id number] EXAM:  CT BRAIN                        ACC: [id number] [facility]  [facility]    PROCEDURE DATE:  03/13/2022          INTERPRETATION:  CT HEAD, CT 3D RECONSTRUCTION WO WORKSTATION, CT   CERVICAL SPINE, CT MAXILLOFACIAL    INDICATIONS: trauma alert, brought in by ambulance s/p mechanical fall. +   facial abrasions and laceration to bridge of nose. Pt denies LOC. Pt c/o   neck pain and left hip pain.    CT BRAIN:    TECHNIQUE:  Multiple contiguous axial images were obtained from the skull   base to the vertex without the use of intravenous contrast.    COMPARISON EXAMINATION: None available at this time.    FINDINGS:  Ventricles and sulci: Parenchymal volume loss is present which is   commensurate with patient age.  Intra-axial: There are hemispheric white matter areas of low attenuation   which are nonspecific but likely related to sequelae of microvascular   disease.  No intracranial mass, acute hemorrhage, or significant midline shift is   present.  Extra-axial: There is no extra-axial collection.  Visualized sinuses: No air-fluid levels are identified. Clear.  Visualized mastoids:  Clear.  Calvarium: Unremarkable.  Miscellaneous:  Bilateral cataract surgery.    Impression: See below    ===========================================================================  ===========    MAXILLOFACIAL CT:    TECHNIQUE: This examination consists of axial 1.25 mm sections from the   frontal sinuses through the mandible.  The study was supplemented with   coronal and sagittal reformatted images.  Dedicated 3-D images were made   using dedicated software and viewed on adedicated 3-D workstation in   multiple planes.    Intravenous contrast was not administered.    FINDINGS:    Nasal bone fracture.    The visualized sinuses demonstrate no evidence of opacification or   mucosal thickening.    The nasopharynx is normal in appearance.    The parotid glands are normal and symmetric in appearance.    The  space is normal bilaterally.    Parapharyngeal space is normal bilaterally.    The tongue base is normal in appearance.    The epiglottis is normal in thickness and appearance.    [place] tonsils are normal in appearance.    The submandibular glands are normal and symmetric in appearance   bilaterally.    The platysma is normal in thickness.    Sternocleidomastoid muscles are normal and symmetric in appearance   bilaterally where visualized.    Anterior strap muscles are unremarkable where visualized.    Aberrant bilateral retropharyngeal course of carotid arteries with mild   bilateral calcification.    No suspicious adenopathy.      IMPRESSION: See below    ===========================================================================  ===========    CT CERVICAL SPINE:    TECHNIQUE:  Axial images were obtained through the cervical spine using   multislice helical technique.  Reformatted coronaland sagittal images   were performed.    COMPARISON EXAMINATION:  None available at this time.    FINDINGS:  On the sagittal reformations, there is no prevertebral soft tissue   swelling. There is no splaying of the spinous processes. Straightening of   the normal lordotic curvature.  On the coronal reformations, occipital condyles are normal. Lateral   masses of C1 align normally with C2.  On the axial images, no lucent fracture line is identified.    Multilevel degenerative osteoarthritis is present. Findings include   marginal osteophytes, uncovertebral spurring, and facet joint space   compartment narrowing with subchondral sclerosis and hypertrophic   osteophytes at multiple levels. There is multilevel degenerative disc   disease. Findings include loss of normal disc space height and endplate   sclerosis.    Moderate right-sided canal stenosis from disc osteophyte complex at C5-6.    Miscellaneous:  None.    IMPRESSIONS:    Head CT: No CT evidence of acute intracranial hemorrhage.    Maxillofacial CT: Nasal bone fracture.    C-spine CT:  No acute fracture.      MEDICATIONS  (STANDING):  acetaminophen   IVPB .. 1000 milliGRAM(s) IV Intermittent once  amLODIPine   Tablet 5 milliGRAM(s) Oral daily  atorvastatin 20 milliGRAM(s) Oral at bedtime  dextrose 40% Gel 15 Gram(s) Oral once  dextrose 5%. 1000 milliLiter(s) (50 mL/Hr) IV Continuous <Continuous>  dextrose 5%. 1000 milliLiter(s) (100 mL/Hr) IV Continuous <Continuous>  dextrose 50% Injectable 25 Gram(s) IV Push once  dextrose 50% Injectable 12.5 Gram(s) IV Push once  dextrose 50% Injectable 25 Gram(s) IV Push once  glucagon  Injectable 1 milliGRAM(s) IntraMuscular once  influenza   Vaccine 0.5 milliLiter(s) IntraMuscular once  insulin glargine SubCutaneous Injection (LANTUS) - Peds 10 Unit(s) SubCutaneous at bedtime  insulin lispro (ADMELOG) corrective regimen sliding scale   SubCutaneous three times a day before meals  metoprolol succinate ER 50 milliGRAM(s) Oral daily  pantoprazole    Tablet 40 milliGRAM(s) Oral before breakfast  senna 2 Tablet(s) Oral at bedtime  sodium chloride 0.9%. 1000 milliLiter(s) (100 mL/Hr) IV Continuous <Continuous>    MEDICATIONS  (PRN):  acetaminophen     Tablet .. 650 milliGRAM(s) Oral every 6 hours PRN Temp greater or equal to 38C (100.4F), Mild Pain (1 - 3)  aluminum hydroxide/magnesium hydroxide/simethicone Suspension 30 milliLiter(s) Oral every 4 hours PRN Dyspepsia  bisacodyl Suppository 10 milliGRAM(s) Rectal daily PRN Constipation  cyclobenzaprine 10 milliGRAM(s) Oral three times a day PRN Muscle Spasm  melatonin 3 milliGRAM(s) Oral at bedtime PRN Insomnia  ondansetron Injectable 4 milliGRAM(s) IV Push every 8 hours PRN Nausea and/or Vomiting  oxyCODONE    IR 10 milliGRAM(s) Oral every 4 hours PRN Severe Pain (7 - 10)  oxyCODONE    IR 5 milliGRAM(s) Oral every 4 hours PRN Moderate Pain (4 - 6)  polyethylene glycol 3350 17 Gram(s) Oral daily PRN Constipation    Pt is a 62y/o  woman with above PMHx a/w:    #  Acute on chronic lower back pain, sciatica, Inability to ambulate  -pt eval  - better pain control with Oxycodone 5/10, muscle relaxers  -bowel regimen  -CCD diet  _will get ortho consult    # DM   -HB A1c 8.5   - Hold oral hypoglycemics   - C/w Lantus, and ISS       #. HTN  - cont meds: on amlodipine and Metoprolol     # HLD  - c/w Lipitor     #. B/l calf tenderness  -  Dopplers: neg for  DVT     # GERD  PPI    # Mild leukocytosis, reactive   resolved    #VTE prophylaxis  lovenox  venodynes  mobilize      Dispo: home with home PT when pain controlled   PCP: Dr LUPIS hayes    Patient is a 66y old  Female who presents with a chief complaint of Mechanical Fall  Difficulty ambulating  Weakness (13 Mar 2022 19:01)        HPI:  66 year old female patient with pmh: HTN, HLD, Dm, type 2, who currently ambulates at baseline with a cane and history noted for prior admission for difficulty ambulating presented to the ED after a mechanical fall. Patient endorses ambulating with her cane when she felt the sudden feeling as though she would fall- patient then fell forward. Denies any preceding dizziness, headache, chest pain, palpitations or pre-syncope.. Patient has not fallen in over a year. Patient endorses mild lower back pain and b/l hand pain since fall. Denies any loss of consciousness as a result of fall.In the ED patient found to have Nasal bone fracture. A nasal laceration was repaired by ED provider. (13 Mar 2022 19:01)    3/14: seen OOB chair post PT, c/o posterior head pain, Left lower back pain and Left ankle pain, nasal pain, no cp or sob, cecy po    Review of system- Rest of the review of system are normal except mentioned in HPI      T(C): 36.8 (03-14-22 @ 09:00), Max: 36.8 (03-13-22 @ 19:51)  HR: 70 (03-14-22 @ 09:00) (68 - 73)  BP: 112/34 (03-14-22 @ 09:00) (112/34 - 123/41)  RR: 18 (03-14-22 @ 09:00) (18 - 20)  SpO2: 97% (03-14-22 @ 09:00) (97% - 99%)  Wt(kg): --          PHYSICAL EXAM:    GENERAL: Comfortable, no acute distress   HEAD:  Normocephalic, atraumatic  EYES: EOMI, PERRLA  HEENT: Moist mucous membranes  NECK: Supple, No JVD  NERVOUS SYSTEM:  Alert & Oriented X3, Motor Strength 5/5 B/L upper and lower extremities  CHEST/LUNG: Clear to auscultation bilaterally  HEART: Regular rate and rhythm  ABDOMEN: Soft, non tender, Nondistended, Bowel sounds present  GENITOURINARY: Voiding, no palpable bladder  EXTREMITIES:   No clubbing, cyanosis, or edema  MUSCULOSKELETAL- + Left lower back pain, left ankle pain  SKIN-no rash      LABS:                        10.9   7.79  )-----------( 219      ( 14 Mar 2022 09:14 )             34.8     03-14    139  |  107  |  19  ----------------------------<  153<H>  4.2   |  28  |  0.76    Ca    8.6      14 Mar 2022 09:14    TPro  6.2  /  Alb  3.2<L>  /  TBili  0.3  /  DBili  x   /  AST  17  /  ALT  29  /  AlkPhos  81  03-14    PT/INR - ( 14 Mar 2022 09:14 )   PT: 11.6 sec;   INR: 1.00 ratio             CAPILLARY BLOOD GLUCOSE      POCT Blood Glucose.: 194 mg/dL (14 Mar 2022 11:26)  POCT Blood Glucose.: 159 mg/dL (14 Mar 2022 07:25)  POCT Blood Glucose.: 235 mg/dL (13 Mar 2022 23:18)        RADIOLOGY & ADDITIONAL TESTS:    < from: Xray Knee 3 Views, Right (03.13.22 @ 16:02) >    ACC: [id number] EXAM:  XR KNEE 3 VIEWS RT                          PROCEDURE DATE:  03/13/2022          INTERPRETATION:  HISTORY:  Fall. Rule out fracture    TECHNIQUE:  AP, oblique and lateral views of the right knee were obtained.    FINDINGS:  There is no evidence of acute fracture. There is mild medial   tibiofemoral joint space narrowing. Osteophytes are noted in the lateral   tibiofemoral compartment. Mild narrowing and osteophytes are noted at the   patellofemoral, compartment. There is nodefinite suprapatellar effusion.   There is no soft tissue abnormality.    IMPRESSION:  No fracture of the right knee  < from: Xray Femur 2 Views, Left (03.13.22 @ 16:02) >    ACC: [id number] EXAM:  XR FEMUR 2 VIEWS LT                        ACC: [id number] EXAM:  XR HIP WITH PELV MIN 4V LT                          PROCEDURE DATE:  03/13/2022          INTERPRETATION:  HISTORY:  Fall. Rule out fracture    TECHNIQUE:  An AP view of the pelvis and cone-down and lateral views of   the left hip, AP and lateral views of the left femur were obtained    FINDINGS:  There is no fracture of the pelvis. The SI joints are   symmetric. Contrast is noted in the distal ureters and bladder.    There is mild arthrosis of the left hip manifest by mild joint space   narrowing and osteophytes. There is no evidence of hip fracture or   dislocation. There are no lytic or blastic lesions in the left femur. No   soft tissue abnormalities arenoted. Arthrosis is also seen in the right   hip. The soft tissues are unremarkable. Degenerative changes are noted in   the lower lumbar spine.    IMPRESSION:  No fracture of the pelvis, left hip or left femur.  < from: CT Abdomen and Pelvis w/ IV Cont (03.13.22 @ 15:42) >    ACC: [id number] EXAM:  CT ABDOMEN AND PELVIS IC                        ACC: [id number] EXAM:  CT CHEST IC                          PROCEDURE DATE:  03/13/2022          INTERPRETATION:  CLINICAL INFORMATION: Trauma.    COMPARISON: 9/17/2016.    CONTRAST/COMPLICATIONS:  IV Contrast: Omnipaque 350 90 cc administered   10 cc discarded  Oral Contrast: NONE  Complications: None reported at time of study completion    PROCEDURE:  CT of the Chest, Abdomen and Pelvis was performed.  Imaging was performed through the chest in the arterial phase followed by   imaging of the abdomen and pelvis in the portal venous phase.  Sagittal and coronal reformats were performed.    FINDINGS:  CHEST:  LUNGS AND LARGE AIRWAYS: Patent central airways. No pulmonary nodules.  PLEURA: No pleural effusion.  VESSELS: Atherosclerotic changes of the aorta and coronary arteries.  HEART: Heart size is normal. No pericardial effusion.  MEDIASTINUM AND ANA: No lymphadenopathy.  CHEST WALL AND LOWER NECK: Within normal limits.    ABDOMEN AND PELVIS:  LIVER: Within normal limits.  BILE DUCTS: Normal caliber.  GALLBLADDER: Cholelithiasis.  SPLEEN: Within normal limits.  PANCREAS: Within normal limits.  ADRENALS: Within normal limits.  KIDNEYS/URETERS: Within normal limits.    BLADDER: Within normal limits.  REPRODUCTIVE ORGANS: Uterus and adnexa within normal limits.    BOWEL: No bowel obstruction. Appendix is normal.  PERITONEUM: No ascites.  VESSELS: Atherosclerotic changes.  RETROPERITONEUM/LYMPH NODES: No lymphadenopathy.  ABDOMINAL WALL: Within normal limits.  BONES: Degenerative changes.    IMPRESSION:  No acute abnormality in the chest, abdomen and pelvis.      < from: CT Head No Cont (03.13.22 @ 15:36) >    ACC: [id number] EXAM:  CT MAXILLOFACIAL                        ACC: [id number] EXAM:  CT CERVICAL SPINE                        ACC: [id number] EXAM:  CT BRAIN                        ACC: [id number] [facility]  [facility]    PROCEDURE DATE:  03/13/2022          INTERPRETATION:  CT HEAD, CT 3D RECONSTRUCTION WO WORKSTATION, CT   CERVICAL SPINE, CT MAXILLOFACIAL    INDICATIONS: trauma alert, brought in by ambulance s/p mechanical fall. +   facial abrasions and laceration to bridge of nose. Pt denies LOC. Pt c/o   neck pain and left hip pain.    CT BRAIN:    TECHNIQUE:  Multiple contiguous axial images were obtained from the skull   base to the vertex without the use of intravenous contrast.    COMPARISON EXAMINATION: None available at this time.    FINDINGS:  Ventricles and sulci: Parenchymal volume loss is present which is   commensurate with patient age.  Intra-axial: There are hemispheric white matter areas of low attenuation   which are nonspecific but likely related to sequelae of microvascular   disease.  No intracranial mass, acute hemorrhage, or significant midline shift is   present.  Extra-axial: There is no extra-axial collection.  Visualized sinuses: No air-fluid levels are identified. Clear.  Visualized mastoids:  Clear.  Calvarium: Unremarkable.  Miscellaneous:  Bilateral cataract surgery.    Impression: See below    ===========================================================================  ===========    MAXILLOFACIAL CT:    TECHNIQUE: This examination consists of axial 1.25 mm sections from the   frontal sinuses through the mandible.  The study was supplemented with   coronal and sagittal reformatted images.  Dedicated 3-D images were made   using dedicated software and viewed on adedicated 3-D workstation in   multiple planes.    Intravenous contrast was not administered.    FINDINGS:    Nasal bone fracture.    The visualized sinuses demonstrate no evidence of opacification or   mucosal thickening.    The nasopharynx is normal in appearance.    The parotid glands are normal and symmetric in appearance.    The  space is normal bilaterally.    Parapharyngeal space is normal bilaterally.    The tongue base is normal in appearance.    The epiglottis is normal in thickness and appearance.    [place] tonsils are normal in appearance.    The submandibular glands are normal and symmetric in appearance   bilaterally.    The platysma is normal in thickness.    Sternocleidomastoid muscles are normal and symmetric in appearance   bilaterally where visualized.    Anterior strap muscles are unremarkable where visualized.    Aberrant bilateral retropharyngeal course of carotid arteries with mild   bilateral calcification.    No suspicious adenopathy.      IMPRESSION: See below    ===========================================================================  ===========    CT CERVICAL SPINE:    TECHNIQUE:  Axial images were obtained through the cervical spine using   multislice helical technique.  Reformatted coronaland sagittal images   were performed.    COMPARISON EXAMINATION:  None available at this time.    FINDINGS:  On the sagittal reformations, there is no prevertebral soft tissue   swelling. There is no splaying of the spinous processes. Straightening of   the normal lordotic curvature.  On the coronal reformations, occipital condyles are normal. Lateral   masses of C1 align normally with C2.  On the axial images, no lucent fracture line is identified.    Multilevel degenerative osteoarthritis is present. Findings include   marginal osteophytes, uncovertebral spurring, and facet joint space   compartment narrowing with subchondral sclerosis and hypertrophic   osteophytes at multiple levels. There is multilevel degenerative disc   disease. Findings include loss of normal disc space height and endplate   sclerosis.    Moderate right-sided canal stenosis from disc osteophyte complex at C5-6.    Miscellaneous:  None.    IMPRESSIONS:    Head CT: No CT evidence of acute intracranial hemorrhage.    Maxillofacial CT: Nasal bone fracture.    C-spine CT:  No acute fracture.      MEDICATIONS  (STANDING):  acetaminophen   IVPB .. 1000 milliGRAM(s) IV Intermittent once  amLODIPine   Tablet 5 milliGRAM(s) Oral daily  atorvastatin 20 milliGRAM(s) Oral at bedtime  dextrose 40% Gel 15 Gram(s) Oral once  dextrose 5%. 1000 milliLiter(s) (50 mL/Hr) IV Continuous <Continuous>  dextrose 5%. 1000 milliLiter(s) (100 mL/Hr) IV Continuous <Continuous>  dextrose 50% Injectable 25 Gram(s) IV Push once  dextrose 50% Injectable 12.5 Gram(s) IV Push once  dextrose 50% Injectable 25 Gram(s) IV Push once  glucagon  Injectable 1 milliGRAM(s) IntraMuscular once  influenza   Vaccine 0.5 milliLiter(s) IntraMuscular once  insulin glargine SubCutaneous Injection (LANTUS) - Peds 10 Unit(s) SubCutaneous at bedtime  insulin lispro (ADMELOG) corrective regimen sliding scale   SubCutaneous three times a day before meals  metoprolol succinate ER 50 milliGRAM(s) Oral daily  pantoprazole    Tablet 40 milliGRAM(s) Oral before breakfast  senna 2 Tablet(s) Oral at bedtime  sodium chloride 0.9%. 1000 milliLiter(s) (100 mL/Hr) IV Continuous <Continuous>    MEDICATIONS  (PRN):  acetaminophen     Tablet .. 650 milliGRAM(s) Oral every 6 hours PRN Temp greater or equal to 38C (100.4F), Mild Pain (1 - 3)  aluminum hydroxide/magnesium hydroxide/simethicone Suspension 30 milliLiter(s) Oral every 4 hours PRN Dyspepsia  bisacodyl Suppository 10 milliGRAM(s) Rectal daily PRN Constipation  cyclobenzaprine 10 milliGRAM(s) Oral three times a day PRN Muscle Spasm  melatonin 3 milliGRAM(s) Oral at bedtime PRN Insomnia  ondansetron Injectable 4 milliGRAM(s) IV Push every 8 hours PRN Nausea and/or Vomiting  oxyCODONE    IR 10 milliGRAM(s) Oral every 4 hours PRN Severe Pain (7 - 10)  oxyCODONE    IR 5 milliGRAM(s) Oral every 4 hours PRN Moderate Pain (4 - 6)  polyethylene glycol 3350 17 Gram(s) Oral daily PRN Constipation    Pt is a 64y/o  woman with above PMHx a/w:    #  Acute on chronic lower back pain, sciatica, Inability to ambulate  -pt eval  - better pain control with Oxycodone 5/10, muscle relaxers  -bowel regimen  -CCD diet  _will get ortho consult    #Left ankle pain   will obtain xrays   ortho consult    # DM   -HB A1c 8.5   - Hold oral hypoglycemics   - C/w Lantus, and ISS       #. HTN  - cont meds: on amlodipine and Metoprolol     # HLD  - c/w Lipitor     #. B/l calf tenderness  -  Dopplers: neg for  DVT     # GERD  PPI    # Mild leukocytosis, reactive   resolved    #VTE prophylaxis  lovenox  venodynes  mobilize      Dispo: home with home PT when pain controlled

## 2022-03-14 NOTE — PHYSICAL THERAPY INITIAL EVALUATION ADULT - MODALITIES TREATMENT COMMENTS
pt left seated in chair post Eval; chair alarm donned; R flowtron donned; L LE elevated on stool/pillow; callbell in reach; pt instructed not to get up alone; call nursing for assist; cecy well; pain 8/10; RN Awilda made aware pt OOB

## 2022-03-14 NOTE — PATIENT PROFILE ADULT - FALL HARM RISK - HARM RISK INTERVENTIONS

## 2022-03-15 LAB
ANION GAP SERPL CALC-SCNC: 5 MMOL/L — SIGNIFICANT CHANGE UP (ref 5–17)
BUN SERPL-MCNC: 15 MG/DL — SIGNIFICANT CHANGE UP (ref 7–23)
CALCIUM SERPL-MCNC: 8.3 MG/DL — LOW (ref 8.5–10.1)
CHLORIDE SERPL-SCNC: 106 MMOL/L — SIGNIFICANT CHANGE UP (ref 96–108)
CO2 SERPL-SCNC: 26 MMOL/L — SIGNIFICANT CHANGE UP (ref 22–31)
CREAT SERPL-MCNC: 0.5 MG/DL — SIGNIFICANT CHANGE UP (ref 0.5–1.3)
EGFR: 105 ML/MIN/1.73M2 — SIGNIFICANT CHANGE UP
GLUCOSE SERPL-MCNC: 134 MG/DL — HIGH (ref 70–99)
HCT VFR BLD CALC: 33 % — LOW (ref 34.5–45)
HGB BLD-MCNC: 9.9 G/DL — LOW (ref 11.5–15.5)
MCHC RBC-ENTMCNC: 25.7 PG — LOW (ref 27–34)
MCHC RBC-ENTMCNC: 30 GM/DL — LOW (ref 32–36)
MCV RBC AUTO: 85.7 FL — SIGNIFICANT CHANGE UP (ref 80–100)
PLATELET # BLD AUTO: 184 K/UL — SIGNIFICANT CHANGE UP (ref 150–400)
POTASSIUM SERPL-MCNC: 4.4 MMOL/L — SIGNIFICANT CHANGE UP (ref 3.5–5.3)
POTASSIUM SERPL-SCNC: 4.4 MMOL/L — SIGNIFICANT CHANGE UP (ref 3.5–5.3)
RBC # BLD: 3.85 M/UL — SIGNIFICANT CHANGE UP (ref 3.8–5.2)
RBC # FLD: 15.5 % — HIGH (ref 10.3–14.5)
SODIUM SERPL-SCNC: 137 MMOL/L — SIGNIFICANT CHANGE UP (ref 135–145)
WBC # BLD: 6.36 K/UL — SIGNIFICANT CHANGE UP (ref 3.8–10.5)
WBC # FLD AUTO: 6.36 K/UL — SIGNIFICANT CHANGE UP (ref 3.8–10.5)

## 2022-03-15 PROCEDURE — 73590 X-RAY EXAM OF LOWER LEG: CPT | Mod: 26,LT

## 2022-03-15 PROCEDURE — 99232 SBSQ HOSP IP/OBS MODERATE 35: CPT

## 2022-03-15 RX ORDER — PROCHLORPERAZINE MALEATE 5 MG
10 TABLET ORAL ONCE
Refills: 0 | Status: DISCONTINUED | OUTPATIENT
Start: 2022-03-15 | End: 2022-03-16

## 2022-03-15 RX ORDER — PROCHLORPERAZINE MALEATE 5 MG
10 TABLET ORAL ONCE
Refills: 0 | Status: DISCONTINUED | OUTPATIENT
Start: 2022-03-15 | End: 2022-03-15

## 2022-03-15 RX ADMIN — Medication 2: at 16:27

## 2022-03-15 RX ADMIN — AMLODIPINE BESYLATE 5 MILLIGRAM(S): 2.5 TABLET ORAL at 11:18

## 2022-03-15 RX ADMIN — OXYCODONE HYDROCHLORIDE 10 MILLIGRAM(S): 5 TABLET ORAL at 16:35

## 2022-03-15 RX ADMIN — OXYCODONE HYDROCHLORIDE 10 MILLIGRAM(S): 5 TABLET ORAL at 15:52

## 2022-03-15 RX ADMIN — CYCLOBENZAPRINE HYDROCHLORIDE 10 MILLIGRAM(S): 10 TABLET, FILM COATED ORAL at 23:50

## 2022-03-15 RX ADMIN — Medication 50 MILLIGRAM(S): at 11:18

## 2022-03-15 RX ADMIN — OXYCODONE HYDROCHLORIDE 10 MILLIGRAM(S): 5 TABLET ORAL at 21:29

## 2022-03-15 RX ADMIN — ATORVASTATIN CALCIUM 20 MILLIGRAM(S): 80 TABLET, FILM COATED ORAL at 21:31

## 2022-03-15 RX ADMIN — ONDANSETRON 4 MILLIGRAM(S): 8 TABLET, FILM COATED ORAL at 08:22

## 2022-03-15 RX ADMIN — OXYCODONE HYDROCHLORIDE 10 MILLIGRAM(S): 5 TABLET ORAL at 06:35

## 2022-03-15 RX ADMIN — ENOXAPARIN SODIUM 40 MILLIGRAM(S): 100 INJECTION SUBCUTANEOUS at 13:48

## 2022-03-15 RX ADMIN — PANTOPRAZOLE SODIUM 40 MILLIGRAM(S): 20 TABLET, DELAYED RELEASE ORAL at 06:34

## 2022-03-15 RX ADMIN — SODIUM CHLORIDE 100 MILLILITER(S): 9 INJECTION INTRAMUSCULAR; INTRAVENOUS; SUBCUTANEOUS at 15:54

## 2022-03-15 RX ADMIN — OXYCODONE HYDROCHLORIDE 10 MILLIGRAM(S): 5 TABLET ORAL at 07:18

## 2022-03-15 RX ADMIN — INSULIN GLARGINE 10 UNIT(S): 100 INJECTION, SOLUTION SUBCUTANEOUS at 21:31

## 2022-03-15 NOTE — CDI QUERY NOTE - NSCDIOTHERTXTBX_GEN_ALL_CORE_HH
Clarification diagnosis for high BMI 46    Please include more specific documentation in your progress note and discharge summary.  The documentation in this medical record requires additional clarification to accurately capture the patient’s diagnosis/diagnoses, treatment and or severity of illness. Please document all corresponding diagnoses, either known or suspected, of the clinical indicators described below.      A. Morbid obesity  B. Other obesity   C. Other please specify  S. Unable to determine    CLINICAL INDICATORS: BMI 46.6    Medicine  PN l. Patient endorses ambulating with her cane when she felt the sudden feeling as though she would fall- patient then fell forward./14: seen OOB chair post PT, c/o posterior head pain, Left lower back pain and Left ankle pain, nasal pain, no cp or sob, cecy po,MUSCULOSKELETAL- + Left lower back pain, left ankle pain  #  Acute on chronic lower back pain, sciatica, Inability to ambulate, DM,

## 2022-03-15 NOTE — PROGRESS NOTE ADULT - SUBJECTIVE AND OBJECTIVE BOX
PCP: Dr LUPIS hayes    Patient is a 66y old  Female who presents with a chief complaint of Mechanical Fall  Difficulty ambulating  Weakness (13 Mar 2022 19:01)    HPI:  66 year old female patient with pmh: HTN, HLD, Dm, type 2, morbid obesity who currently ambulates at baseline with a cane and history noted for prior admission for difficulty ambulating presented to the ED after a mechanical fall. Patient endorses ambulating with her cane when she felt the sudden feeling as though she would fall- patient then fell forward. Denies any preceding dizziness, headache, chest pain, palpitations or pre-syncope.. Patient has not fallen in over a year. Patient endorses mild lower back pain and b/l hand pain since fall. Denies any loss of consciousness as a result of fall.In the ED patient found to have Nasal bone fracture. A nasal laceration was repaired by ED provider. (13 Mar 2022 19:01)    3/14: seen OOB chair post PT, c/o posterior head pain, Left lower back pain and Left ankle pain, nasal pain, no cp or sob, cecy po  3/15 +vomiting bilious material/ non-bloody. States has been having intermittent vomiting at home for the last few weeks- months    Review of system- Rest of the review of system are normal except mentioned in HPI    Vital Signs Last 24 Hrs  T(C): 36.8 (15 Mar 2022 08:44), Max: 37.1 (15 Mar 2022 00:06)  T(F): 98.3 (15 Mar 2022 08:44), Max: 98.7 (15 Mar 2022 00:06)  HR: 76 (15 Mar 2022 08:44) (72 - 76)  BP: 126/48 (15 Mar 2022 08:44) (119/49 - 126/48)  BP(mean): --  RR: 18 (15 Mar 2022 08:44) (18 - 18)  SpO2: 94% (15 Mar 2022 08:44) (94% - 98%)    PHYSICAL EXAM:  GENERAL: Comfortable, no acute distress   HEAD:  Normocephalic, atraumatic  EYES: EOMI, PERRLA  HEENT: Moist mucous membranes  NECK: Supple, No JVD  NERVOUS SYSTEM:  Alert & Oriented X3, Motor Strength 5/5 B/L upper and lower extremities  CHEST/LUNG: Clear to auscultation bilaterally  HEART: Regular rate and rhythm  ABDOMEN: Soft, non tender, Nondistended, Bowel sounds present  GENITOURINARY: Voiding, no palpable bladder  EXTREMITIES:   No clubbing, cyanosis, or edema  MUSCULOSKELETAL- + Left lower back pain, left ankle pain  SKIN-no rash      LABS:                        9.9    6.36  )-----------( 184      ( 15 Mar 2022 07:56 )             33.0     15 Mar 2022 07:56    137    |  106    |  15     ----------------------------<  134    4.4     |  26     |  0.50     Ca    8.3        15 Mar 2022 07:56    TPro  6.2    /  Alb  3.2    /  TBili  0.3    /  DBili  x      /  AST  17     /  ALT  29     /  AlkPhos  81     14 Mar 2022 09:14    LIVER FUNCTIONS - ( 14 Mar 2022 09:14 )  Alb: 3.2 g/dL / Pro: 6.2 gm/dL / ALK PHOS: 81 U/L / ALT: 29 U/L / AST: 17 U/L / GGT: x           PT/INR - ( 14 Mar 2022 09:14 )   PT: 11.6 sec;   INR: 1.00 ratio      CAPILLARY BLOOD GLUCOSE  POCT Blood Glucose.: 139 mg/dL (15 Mar 2022 11:15)  POCT Blood Glucose.: 139 mg/dL (15 Mar 2022 07:24)  POCT Blood Glucose.: 207 mg/dL (14 Mar 2022 22:05)  POCT Blood Glucose.: 153 mg/dL (14 Mar 2022 16:40)    RADIOLOGY & ADDITIONAL TESTS:    < from: Xray Knee 3 Views, Right (03.13.22 @ 16:02) >    ACC: [id number] EXAM:  XR KNEE 3 VIEWS RT                          PROCEDURE DATE:  03/13/2022          INTERPRETATION:  HISTORY:  Fall. Rule out fracture    TECHNIQUE:  AP, oblique and lateral views of the right knee were obtained.    FINDINGS:  There is no evidence of acute fracture. There is mild medial   tibiofemoral joint space narrowing. Osteophytes are noted in the lateral   tibiofemoral compartment. Mild narrowing and osteophytes are noted at the   patellofemoral, compartment. There is nodefinite suprapatellar effusion.   There is no soft tissue abnormality.    IMPRESSION:  No fracture of the right knee  < from: Xray Femur 2 Views, Left (03.13.22 @ 16:02) >    ACC: [id number] EXAM:  XR FEMUR 2 VIEWS LT                        ACC: [id number] EXAM:  XR HIP WITH PELV MIN 4V LT                          PROCEDURE DATE:  03/13/2022          INTERPRETATION:  HISTORY:  Fall. Rule out fracture    TECHNIQUE:  An AP view of the pelvis and cone-down and lateral views of   the left hip, AP and lateral views of the left femur were obtained    FINDINGS:  There is no fracture of the pelvis. The SI joints are   symmetric. Contrast is noted in the distal ureters and bladder.    There is mild arthrosis of the left hip manifest by mild joint space   narrowing and osteophytes. There is no evidence of hip fracture or   dislocation. There are no lytic or blastic lesions in the left femur. No   soft tissue abnormalities are noted. Arthrosis is also seen in the right   hip. The soft tissues are unremarkable. Degenerative changes are noted in   the lower lumbar spine.    IMPRESSION:  No fracture of the pelvis, left hip or left femur.  < from: CT Abdomen and Pelvis w/ IV Cont (03.13.22 @ 15:42) >    ACC: [id number] EXAM:  CT ABDOMEN AND PELVIS IC                        ACC: [id number] EXAM:  CT CHEST IC                          PROCEDURE DATE:  03/13/2022          INTERPRETATION:  CLINICAL INFORMATION: Trauma.    COMPARISON: 9/17/2016.    CONTRAST/COMPLICATIONS:  IV Contrast: Omnipaque 350 90 cc administered   10 cc discarded  Oral Contrast: NONE  Complications: None reported at time of study completion    PROCEDURE:  CT of the Chest, Abdomen and Pelvis was performed.  Imaging was performed through the chest in the arterial phase followed by   imaging of the abdomen and pelvis in the portal venous phase.  Sagittal and coronal reformats were performed.    FINDINGS:  CHEST:  LUNGS AND LARGE AIRWAYS: Patent central airways. No pulmonary nodules.  PLEURA: No pleural effusion.  VESSELS: Atherosclerotic changes of the aorta and coronary arteries.  HEART: Heart size is normal. No pericardial effusion.  MEDIASTINUM AND ANA: No lymphadenopathy.  CHEST WALL AND LOWER NECK: Within normal limits.    ABDOMEN AND PELVIS:  LIVER: Within normal limits.  BILE DUCTS: Normal caliber.  GALLBLADDER: Cholelithiasis.  SPLEEN: Within normal limits.  PANCREAS: Within normal limits.  ADRENALS: Within normal limits.  KIDNEYS/URETERS: Within normal limits.    BLADDER: Within normal limits.  REPRODUCTIVE ORGANS: Uterus and adnexa within normal limits.    BOWEL: No bowel obstruction. Appendix is normal.  PERITONEUM: No ascites.  VESSELS: Atherosclerotic changes.  RETROPERITONEUM/LYMPH NODES: No lymphadenopathy.  ABDOMINAL WALL: Within normal limits.  BONES: Degenerative changes.    IMPRESSION:  No acute abnormality in the chest, abdomen and pelvis.      < from: CT Head No Cont (03.13.22 @ 15:36) >    ACC: [id number] EXAM:  CT MAXILLOFACIAL                        ACC: [id number] EXAM:  CT CERVICAL SPINE                        ACC: [id number] EXAM:  CT BRAIN                        ACC: [id number] EXAM:  [facility]    PROCEDURE DATE:  03/13/2022          INTERPRETATION:  CT HEAD, CT 3D RECONSTRUCTION WO WORKSTATION, CT   CERVICAL SPINE, CT MAXILLOFACIAL    INDICATIONS: trauma alert, brought in by ambulance s/p mechanical fall. +   facial abrasions and laceration to bridge of nose. Pt denies LOC. Pt c/o   neck pain and left hip pain.    CT BRAIN:    TECHNIQUE:  Multiple contiguous axial images were obtained from the skull   base to the vertex without the use of intravenous contrast.    COMPARISON EXAMINATION: None available at this time.    FINDINGS:  Ventricles and sulci: Parenchymal volume loss is present which is   commensurate with patient age.  Intra-axial: There are hemispheric white matter areas of low attenuation   which are nonspecific but likely related to sequelae of microvascular   disease.  No intracranial mass, acute hemorrhage, or significant midline shift is   present.  Extra-axial: There is no extra-axial collection.  Visualized sinuses: No air-fluid levels are identified. Clear.  Visualized mastoids:  Clear.  Calvarium: Unremarkable.  Miscellaneous:  Bilateral cataract surgery.    Impression: See below    ===========================================================================  ===========    MAXILLOFACIAL CT:    TECHNIQUE: This examination consists of axial 1.25 mm sections from the   frontal sinuses through the mandible.  The study was supplemented with   coronal and sagittal reformatted images.  Dedicated 3-D images were made   using dedicated software and viewed on [employer] 3-D workstation in   multiple planes.    Intravenous contrast was not administered.    FINDINGS:    Nasal bone fracture.    The visualized sinuses demonstrate no evidence of opacification or   mucosal thickening.    The nasopharynx is normal in appearance.    The parotid glands are normal and symmetric in appearance.    The  space is normal bilaterally.    Parapharyngeal space is normal bilaterally.    The tongue base is normal in appearance.    The epiglottis is normal in thickness and appearance.    [place] tonsils are normal in appearance.    The submandibular glands are normal and symmetric in appearance   bilaterally.    The platysma is normal in thickness.    Sternocleidomastoid muscles are normal and symmetric in appearance   bilaterally where visualized.    Anterior strap muscles are unremarkable where visualized.    Aberrant bilateral retropharyngeal course of carotid arteries with mild   bilateral calcification.    No suspicious adenopathy.      IMPRESSION: See below    ===========================================================================  ===========    CT CERVICAL SPINE:    TECHNIQUE:  Axial images were obtained through the cervical spine using   multislice helical technique.  Reformatted coronaland sagittal images   were performed.    COMPARISON EXAMINATION:  None available at this time.    FINDINGS:  On the sagittal reformations, there is no prevertebral soft tissue   swelling. There is no splaying of the spinous processes. Straightening of   the normal lordotic curvature.  On the coronal reformations, occipital condyles are normal. Lateral   masses of C1 align normally with C2.  On the axial images, no lucent fracture line is identified.    Multilevel degenerative osteoarthritis is present. Findings include   marginal osteophytes, uncovertebral spurring, and facet joint space   compartment narrowing with subchondral sclerosis and hypertrophic   osteophytes at multiple levels. There is multilevel degenerative disc   disease. Findings include loss of normal disc space height and endplate   sclerosis.    Moderate right-sided canal stenosis from disc osteophyte complex at C5-6.    Miscellaneous:  None.    IMPRESSIONS:    Head CT: No CT evidence of acute intracranial hemorrhage.    Maxillofacial CT: Nasal bone fracture.    C-spine CT:  No acute fracture.    MEDICATIONS  (STANDING):  amLODIPine   Tablet 5 milliGRAM(s) Oral daily  atorvastatin 20 milliGRAM(s) Oral at bedtime  dextrose 40% Gel 15 Gram(s) Oral once  dextrose 5%. 1000 milliLiter(s) (50 mL/Hr) IV Continuous <Continuous>  dextrose 5%. 1000 milliLiter(s) (100 mL/Hr) IV Continuous <Continuous>  dextrose 50% Injectable 25 Gram(s) IV Push once  dextrose 50% Injectable 12.5 Gram(s) IV Push once  dextrose 50% Injectable 25 Gram(s) IV Push once  enoxaparin Injectable 40 milliGRAM(s) SubCutaneous every 24 hours  glucagon  Injectable 1 milliGRAM(s) IntraMuscular once  influenza   Vaccine 0.5 milliLiter(s) IntraMuscular once  insulin glargine SubCutaneous Injection (LANTUS) - Peds 10 Unit(s) SubCutaneous at bedtime  insulin lispro (ADMELOG) corrective regimen sliding scale   SubCutaneous three times a day before meals  metoprolol succinate ER 50 milliGRAM(s) Oral daily  pantoprazole    Tablet 40 milliGRAM(s) Oral before breakfast  prochlorperazine   Injectable 10 milliGRAM(s) IV Push once  senna 2 Tablet(s) Oral at bedtime  sodium chloride 0.9%. 1000 milliLiter(s) (100 mL/Hr) IV Continuous <Continuous    MEDICATIONS  (PRN):  acetaminophen     Tablet .. 650 milliGRAM(s) Oral every 6 hours PRN Temp greater or equal to 38C (100.4F), Mild Pain (1 - 3)  aluminum hydroxide/magnesium hydroxide/simethicone Suspension 30 milliLiter(s) Oral every 4 hours PRN Dyspepsia  bisacodyl Suppository 10 milliGRAM(s) Rectal daily PRN Constipation  cyclobenzaprine 10 milliGRAM(s) Oral three times a day PRN Muscle Spasm  melatonin 3 milliGRAM(s) Oral at bedtime PRN Insomnia  ondansetron Injectable 4 milliGRAM(s) IV Push every 8 hours PRN Nausea and/or Vomiting  oxyCODONE    IR 10 milliGRAM(s) Oral every 4 hours PRN Severe Pain (7 - 10)  oxyCODONE    IR 5 milliGRAM(s) Oral every 4 hours PRN Moderate Pain (4 - 6)  polyethylene glycol 3350 17 Gram(s) Oral daily PRN Constipation      Assessment/Plan:  #S/p mechanical fall with nasal bone fractures  Imaging studies reviewed  Pain meds prn  OOB to ambulate with PT- will need rehab  Muscle relaxants prn    #Left ankle pain likely sprained  Ortho seen, no evidence of fracture  WBAT with PT    #Intermittent vomiting  ?diabetic gastroparesis vs other etiology (?gallstones)  GI eval DR Whaleyfo  Antiemetics prn, PPI  CT abd reviewed, +gallstones, otherwise unremarkable    #Chronic back pain- pain meds prn. Outpatient f/u    #Diabetes  BGM with ISS    #HTN  - cont meds: on amlodipine and Metoprolol     #HLD  - c/w Lipitor     #GERD  PPI    #Mild leukocytosis, reactive   resolved    #Morbid obesity- lifestyle modifications    #VTE prophylaxis  lovenox  venodynes  mobilize    Dispo: GI eval, likely rehab tomorrow

## 2022-03-16 ENCOUNTER — TRANSCRIPTION ENCOUNTER (OUTPATIENT)
Age: 67
End: 2022-03-16

## 2022-03-16 VITALS
TEMPERATURE: 99 F | DIASTOLIC BLOOD PRESSURE: 40 MMHG | SYSTOLIC BLOOD PRESSURE: 131 MMHG | HEART RATE: 83 BPM | RESPIRATION RATE: 17 BRPM | OXYGEN SATURATION: 94 %

## 2022-03-16 PROCEDURE — 99239 HOSP IP/OBS DSCHRG MGMT >30: CPT

## 2022-03-16 RX ORDER — POLYETHYLENE GLYCOL 3350 17 G/17G
17 POWDER, FOR SOLUTION ORAL
Qty: 0 | Refills: 0 | DISCHARGE
Start: 2022-03-16

## 2022-03-16 RX ORDER — AMLODIPINE BESYLATE 2.5 MG/1
1 TABLET ORAL
Qty: 0 | Refills: 0 | DISCHARGE
Start: 2022-03-16

## 2022-03-16 RX ORDER — SENNA PLUS 8.6 MG/1
2 TABLET ORAL
Qty: 0 | Refills: 0 | DISCHARGE
Start: 2022-03-16

## 2022-03-16 RX ORDER — METOPROLOL TARTRATE 50 MG
1 TABLET ORAL
Qty: 0 | Refills: 0 | DISCHARGE
Start: 2022-03-16

## 2022-03-16 RX ORDER — ACETAMINOPHEN 500 MG
2 TABLET ORAL
Qty: 0 | Refills: 0 | DISCHARGE
Start: 2022-03-16

## 2022-03-16 RX ORDER — CX-024414 0.2 MG/ML
0.5 INJECTION, SUSPENSION INTRAMUSCULAR
Qty: 0 | Refills: 0 | DISCHARGE

## 2022-03-16 RX ORDER — OXYCODONE HYDROCHLORIDE 5 MG/1
1 TABLET ORAL
Qty: 0 | Refills: 0 | DISCHARGE
Start: 2022-03-16

## 2022-03-16 RX ADMIN — OXYCODONE HYDROCHLORIDE 5 MILLIGRAM(S): 5 TABLET ORAL at 09:50

## 2022-03-16 RX ADMIN — OXYCODONE HYDROCHLORIDE 10 MILLIGRAM(S): 5 TABLET ORAL at 02:30

## 2022-03-16 RX ADMIN — OXYCODONE HYDROCHLORIDE 10 MILLIGRAM(S): 5 TABLET ORAL at 17:25

## 2022-03-16 RX ADMIN — Medication 2: at 11:14

## 2022-03-16 RX ADMIN — Medication 1: at 08:57

## 2022-03-16 RX ADMIN — ENOXAPARIN SODIUM 40 MILLIGRAM(S): 100 INJECTION SUBCUTANEOUS at 12:49

## 2022-03-16 RX ADMIN — OXYCODONE HYDROCHLORIDE 10 MILLIGRAM(S): 5 TABLET ORAL at 18:01

## 2022-03-16 RX ADMIN — Medication 2: at 17:28

## 2022-03-16 RX ADMIN — Medication 50 MILLIGRAM(S): at 08:58

## 2022-03-16 RX ADMIN — OXYCODONE HYDROCHLORIDE 10 MILLIGRAM(S): 5 TABLET ORAL at 12:50

## 2022-03-16 RX ADMIN — AMLODIPINE BESYLATE 5 MILLIGRAM(S): 2.5 TABLET ORAL at 08:58

## 2022-03-16 RX ADMIN — OXYCODONE HYDROCHLORIDE 5 MILLIGRAM(S): 5 TABLET ORAL at 08:58

## 2022-03-16 RX ADMIN — OXYCODONE HYDROCHLORIDE 10 MILLIGRAM(S): 5 TABLET ORAL at 13:40

## 2022-03-16 RX ADMIN — OXYCODONE HYDROCHLORIDE 10 MILLIGRAM(S): 5 TABLET ORAL at 01:24

## 2022-03-16 NOTE — DISCHARGE NOTE NURSING/CASE MANAGEMENT/SOCIAL WORK - PATIENT PORTAL LINK FT
You can access the FollowMyHealth Patient Portal offered by [facility] by registering at the following website: [email or web address]. By joining [employer]’s FollowMyHealth portal, you will also be able to view your health information using other applications (apps) compatible with our system.

## 2022-03-16 NOTE — DISCHARGE NOTE PROVIDER - NSDCCPCAREPLAN_GEN_ALL_CORE_FT
PRINCIPAL DISCHARGE DIAGNOSIS  Diagnosis: Nasal fracture  Assessment and Plan of Treatment: follow up with PCP after discharge from rehab  observe for any s/s of infection      SECONDARY DISCHARGE DIAGNOSES  Diagnosis: Left ankle sprain  Assessment and Plan of Treatment: afo brace  physical therapy for rehab

## 2022-03-16 NOTE — DISCHARGE NOTE PROVIDER - NSDCMRMEDTOKEN_GEN_ALL_CORE_FT
atorvastatin 20 mg oral tablet: 1 tab(s) orally once a day   cyclobenzaprine 10 mg oral tablet: 1 tab(s) orally 3 times a day   glipiZIDE 5 mg oral tablet: 1 tab(s) orally once a day  Januvia 100 mg oral tablet: 1 tab(s) orally once a day  Jardiance 25 mg oral tablet: 1 tab(s) orally once a day (in the morning)  lisinopril 40 mg oral tablet: 1 tab(s) orally once a day  metFORMIN 1000 mg oral tablet: 1 tab(s) orally 2 times a day   Metoprolol Succinate ER 50 mg oral tablet, extended release: 1 tab(s) orally once a day   Moderna COVID-19 Vaccine  mcg/0.5 mL intramuscular suspension: 0.5 milliliter(s) intramuscular once  ***2nd dose taken***  pantoprazole 40 mg oral delayed release tablet: 1 tab(s) orally once a day   amLODIPine 5 mg oral tablet: 1 tab(s) orally once a day  atorvastatin 20 mg oral tablet: 1 tab(s) orally once a day   cyclobenzaprine 10 mg oral tablet: 1 tab(s) orally 3 times a day   enoxaparin: 40 milligram(s) subcutaneously once a day x 4 weeks from 3/13/22-4/10/22  glipiZIDE 5 mg oral tablet: 1 tab(s) orally once a day  Januvia 100 mg oral tablet: 1 tab(s) orally once a day  Jardiance 25 mg oral tablet: 1 tab(s) orally once a day (in the morning)  lisinopril 40 mg oral tablet: 1 tab(s) orally once a day  metFORMIN 1000 mg oral tablet: 1 tab(s) orally 2 times a day   metoprolol succinate 50 mg oral tablet, extended release: 1 tab(s) orally once a day  MiraLax oral powder for reconstitution: 17 gram(s) orally once a day, As needed, Constipation  oxyCODONE 5 mg oral tablet: 1 tab(s) orally every 4 hours, As needed, Moderate Pain (4 - 6)  pantoprazole 40 mg oral delayed release tablet: 1 tab(s) orally once a day  senna oral tablet: 2 tab(s) orally once a day (at bedtime)  Tylenol Regular Strength 325 mg oral tablet: 2 tab(s) orally every 6 hours, As needed, Temp greater or equal to 38C (100.4F), Mild Pain (1 - 3)

## 2022-03-16 NOTE — DISCHARGE NOTE NURSING/CASE MANAGEMENT/SOCIAL WORK - NSDCVIVACCINE_GEN_ALL_CORE_FT
Tdap; 13-Mar-2022 17:23; Maira Talavera (RN); [employer]; [id number] (Exp. Date: 28-Sep-2023); IntraMuscular; Deltoid Left.; 0.5 milliLiter(s); VIS (VIS Published: 09-May-2013, VIS Presented: 13-Mar-2022);

## 2022-03-16 NOTE — DISCHARGE NOTE PROVIDER - HOSPITAL COURSE
66 year old female patient with pmh: chronic back pain, HTN, HLD, Dm, type 2, morbid obesity who currently ambulates at baseline with a cane and history noted for prior admission for difficulty ambulating presented to the ED after a mechanical fall. Patient endorses ambulating with her cane when she felt the sudden feeling as though she would fall. Denies any preceding dizziness, headache, chest pain, palpitations or pre-syncope.. Patient has not fallen in over a year. Patient endorses mild lower back pain and b/l hand pain and left ankle pain since fall. Denies any loss of consciousness as a result of fall. In the ED patient found to have Nasal bone fracture. A nasal laceration was repaired by ED provider.  Imaging neg for any fractures, left ankle sprain, ortho consulted and AFO brace was placed today, no further Ortho interventions were warranted.  GI consult for freq nausea , vomiting, started on pantoprazole with improvement, pt to f/u with Dr Hall's office as out pt. Physical therapy evaluate pt with balanced impairments noted, pt lives alone, rehab was recommended, pt is now ready for discharge    3/16: physical exam    GENERAL: Comfortable, no acute distress   HEAD:  Normocephalic, atraumatic  EYES: EOMI, PERRLA  HEENT: Moist mucous membranes  NECK: Supple, No JVD  NERVOUS SYSTEM:  Alert & Oriented X3, Motor Strength 5/5 B/L upper and lower extremities  CHEST/LUNG: Clear to auscultation bilaterally  HEART: Regular rate and rhythm  ABDOMEN: Soft, non tender, Nondistended, Bowel sounds present  GENITOURINARY: Voiding, no palpable bladder  EXTREMITIES:   No clubbing, cyanosis, or edema  MUSCULOSKELETAL- + Left lower back pain, left ankle pain  SKIN-no rash                        9.9    6.36  )-----------( 184      ( 15 Mar 2022 07:56 )             33.0     03-15    137  |  106  |  15  ----------------------------<  134<H>  4.4   |  26  |  0.50    Ca    8.3<L>      15 Mar 2022 07:56    < from: Xray Tibia + Fibula 2 Views, Left (03.15.22 @ 09:10) >    IMPRESSION:  No acute findings in this study and concurrent CT    < from: CT Ankle No Cont, Left (03.14.22 @ 21:33) >    IMPRESSION:    Osteopenia without displaced fracture or dislocation. If there is   continued clinical suspicion, MRI may be obtained for further evaluation.    < from: Xray Elbow AP + Lateral, Left (03.14.22 @ 17:45) >    IMPRESSION:   No acute radiographic osseous pathology..    < end of copied text >    < from: Xray Ankle Complete 3 Views, Left (03.14.22 @ 07:31) >    IMPRESSION:   No acute radiographic osseous pathology..    < from: Xray Knee 3 Views, Right (03.13.22 @ 16:02) >    IMPRESSION:  No fracture of the right knee    < from: Xray Femur 2 Views, Left (03.13.22 @ 16:02) >    MPRESSION:  No fracture of the pelvis    < from: CT Abdomen and Pelvis w/ IV Cont (03.13.22 @ 15:42) >    IMPRESSION:  No acute abnormality in the chest, abdomen and pelvis.    < from: CT Abdomen and Pelvis w/ IV Cont (03.13.22 @ 15:42) >    IMPRESSION:  No acute abnormality in the chest, abdomen and pelvis.      final diagnoses:  #S/p mechanical fall with nasal bone fractures  #Left ankle pain likely sprained: AFO placed  #Intermittent vomiting, GERD, PPI  #Chronic back pain- pain meds prn. Outpatient f/u  #Diabetes  #HTN  #Mild leukocytosis, reactive : resolved  #Morbid obesity- lifestyle modifications  #VTE prophylaxis: lovenox    time for discharge: 45 mins  discharge summary to be faxed to pcp 66 year old female patient with pmh: chronic back pain, HTN, HLD, Dm, type 2, morbid obesity who currently ambulates at baseline with a cane and history noted for prior admission for difficulty ambulating presented to the ED after a mechanical fall. Patient endorses ambulating with her cane when she felt the sudden feeling as though she would fall. Denies any preceding dizziness, headache, chest pain, palpitations or pre-syncope.. Patient has not fallen in over a year. Patient endorses mild lower back pain and b/l hand pain and left ankle pain since fall. Denies any loss of consciousness as a result of fall. In the ED patient found to have Nasal bone fracture. A nasal laceration was repaired by ED provider.  Imaging neg for any fractures, left ankle sprain, ortho consulted and AFO brace was placed today, no further Ortho interventions were warranted.  GI consult for freq nausea , vomiting, started on pantoprazole with improvement, pt to f/u with Dr Hall's office as out pt. Physical therapy evaluate pt with balanced impairments noted, pt lives alone, rehab was recommended, pt is now ready for discharge    3/16: physical exam    GENERAL: Comfortable, no acute distress   HEAD:  Normocephalic, atraumatic  EYES: EOMI, PERRLA  HEENT: Moist mucous membranes  NECK: Supple, No JVD  NERVOUS SYSTEM:  Alert & Oriented X3, Motor Strength 5/5 B/L upper and lower extremities  CHEST/LUNG: Clear to auscultation bilaterally  HEART: Regular rate and rhythm  ABDOMEN: Soft, non tender, Nondistended, Bowel sounds present  GENITOURINARY: Voiding, no palpable bladder  EXTREMITIES:   No clubbing, cyanosis, or edema  MUSCULOSKELETAL- + Left lower back pain, left ankle pain  SKIN-no rash                        9.9    6.36  )-----------( 184      ( 15 Mar 2022 07:56 )             33.0     03-15    137  |  106  |  15  ----------------------------<  134<H>  4.4   |  26  |  0.50    Ca    8.3<L>      15 Mar 2022 07:56    < from: Xray Tibia + Fibula 2 Views, Left (03.15.22 @ 09:10) >    IMPRESSION:  No acute findings in this study and concurrent CT    < from: CT Ankle No Cont, Left (03.14.22 @ 21:33) >    IMPRESSION:    Osteopenia without displaced fracture or dislocation. If there is   continued clinical suspicion, MRI may be obtained for further evaluation.    < from: Xray Elbow AP + Lateral, Left (03.14.22 @ 17:45) >    IMPRESSION:   No acute radiographic osseous pathology..    < end of copied text >    < from: Xray Ankle Complete 3 Views, Left (03.14.22 @ 07:31) >    IMPRESSION:   No acute radiographic osseous pathology..    < from: Xray Knee 3 Views, Right (03.13.22 @ 16:02) >    IMPRESSION:  No fracture of the right knee    < from: Xray Femur 2 Views, Left (03.13.22 @ 16:02) >    MPRESSION:  No fracture of the pelvis    < from: CT Abdomen and Pelvis w/ IV Cont (03.13.22 @ 15:42) >    IMPRESSION:  No acute abnormality in the chest, abdomen and pelvis.    < from: CT Abdomen and Pelvis w/ IV Cont (03.13.22 @ 15:42) >    IMPRESSION:  No acute abnormality in the chest, abdomen and pelvis.      final diagnoses:  #S/p mechanical fall with nasal bone fractures  #Left ankle pain likely sprained: AFO placed  #Intermittent vomiting, GERD, PPI. Outpatient EGD  #Chronic back pain- pain meds prn. Outpatient f/u  #Diabetes  #HTN  #Mild leukocytosis, reactive : resolved  #Morbid obesity- lifestyle modifications  #VTE prophylaxis: lovenox    time for discharge: 45 mins  discharge summary to be faxed to pcp

## 2022-03-16 NOTE — CONSULT NOTE ADULT - SUBJECTIVE AND OBJECTIVE BOX
GI consult    HPI:  66 year old female patient who currently ambulates at baseline with a cane and history noted for prior admission for difficulty ambulating presented to the ED after a mechanical fall. Patient endorses ambulating with her cane when she felt the sudden feeling as though she would fall- patient then fell forward. Denies any preceding dizziness, headache, chest pain, palpitations or pre-syncope.. Patient has not fallen in over a year. Patient endorses mild lower back pain and b/l hand pain since fall. Denies any loss of consciousness as a result of fall.    In the ED patient found to have Nasal bone fracture. A nasal laceration was repaired by ED provider. (13 Mar 2022 19:01)    Called to see for vomiting. Pt seen and interviewed in [place] with aide at bedside.  Pt reports 1-2 episodes of nocturnal regurgitation. Pt denies that she has had a Endoscopy or Colonoscopy in the past. Admits to being currently on a PPI.       PAST MEDICAL & SURGICAL HISTORY:  DM (diabetes mellitus)    HTN (hypertension)    HLD (hyperlipidemia)    No significant past surgical history        Home Medications:  glipiZIDE 5 mg oral tablet: 1 tab(s) orally once a day (13 Mar 2022 20:19)  Januvia 100 mg oral tablet: 1 tab(s) orally once a day (13 Mar 2022 20:19)  Jardiance 25 mg oral tablet: 1 tab(s) orally once a day (in the morning) (13 Mar 2022 20:19)  lisinopril 40 mg oral tablet: 1 tab(s) orally once a day (13 Mar 2022 20:19)  Moderna COVID-19 Vaccine  mcg/0.5 mL intramuscular suspension: 0.5 milliliter(s) intramuscular once  ***2nd dose taken*** (13 Mar 2022 20:19)  pantoprazole 40 mg oral delayed release tablet: 1 tab(s) orally once a day (13 Mar 2022 20:19)      MEDICATIONS  (STANDING):  amLODIPine   Tablet 5 milliGRAM(s) Oral daily  atorvastatin 20 milliGRAM(s) Oral at bedtime  dextrose 40% Gel 15 Gram(s) Oral once  dextrose 5%. 1000 milliLiter(s) (50 mL/Hr) IV Continuous <Continuous>  dextrose 5%. 1000 milliLiter(s) (100 mL/Hr) IV Continuous <Continuous>  dextrose 50% Injectable 25 Gram(s) IV Push once  dextrose 50% Injectable 12.5 Gram(s) IV Push once  dextrose 50% Injectable 25 Gram(s) IV Push once  enoxaparin Injectable 40 milliGRAM(s) SubCutaneous every 24 hours  glucagon  Injectable 1 milliGRAM(s) IntraMuscular once  influenza   Vaccine 0.5 milliLiter(s) IntraMuscular once  insulin glargine SubCutaneous Injection (LANTUS) - Peds 10 Unit(s) SubCutaneous at bedtime  insulin lispro (ADMELOG) corrective regimen sliding scale   SubCutaneous three times a day before meals  metoprolol succinate ER 50 milliGRAM(s) Oral daily  pantoprazole    Tablet 40 milliGRAM(s) Oral before breakfast  prochlorperazine   Injectable 10 milliGRAM(s) IV Push once  senna 2 Tablet(s) Oral at bedtime  sodium chloride 0.9%. 1000 milliLiter(s) (100 mL/Hr) IV Continuous <Continuous>    MEDICATIONS  (PRN):  acetaminophen     Tablet .. 650 milliGRAM(s) Oral every 6 hours PRN Temp greater or equal to 38C (100.4F), Mild Pain (1 - 3)  aluminum hydroxide/magnesium hydroxide/simethicone Suspension 30 milliLiter(s) Oral every 4 hours PRN Dyspepsia  bisacodyl Suppository 10 milliGRAM(s) Rectal daily PRN Constipation  cyclobenzaprine 10 milliGRAM(s) Oral three times a day PRN Muscle Spasm  melatonin 3 milliGRAM(s) Oral at bedtime PRN Insomnia  ondansetron Injectable 4 milliGRAM(s) IV Push every 8 hours PRN Nausea and/or Vomiting  oxyCODONE    IR 10 milliGRAM(s) Oral every 4 hours PRN Severe Pain (7 - 10)  oxyCODONE    IR 5 milliGRAM(s) Oral every 4 hours PRN Moderate Pain (4 - 6)  polyethylene glycol 3350 17 Gram(s) Oral daily PRN Constipation      Allergies    No Known Allergies    Intolerances        SOCIAL HISTORY:    FAMILY HISTORY:  No pertinent family history in first degree relatives        ROS  As above  Otherwise unremarkable, all systems reviewed    PE:    Vital Signs Last 24 Hrs  T(C): 37.2 (16 Mar 2022 08:35), Max: 37.2 (15 Mar 2022 20:04)  T(F): 98.9 (16 Mar 2022 08:35), Max: 99 (15 Mar 2022 20:04)  HR: 83 (16 Mar 2022 08:35) (71 - 83)  BP: 131/40 (16 Mar 2022 08:35) (115/47 - 131/40)  BP(mean): --  RR: 17 (16 Mar 2022 08:35) (17 - 18)  SpO2: 94% (16 Mar 2022 08:35) (94% - 95%)    Constitutional: NAD, well-developed, A+Ox3  Anicteric   Respiratory: CTABL, breathing comfortably  Cardiovascular: S1 and S2, RRR  Gastrointestinal: +BS, soft, non tender, non distended, no mass  Extremities: warm, well perfused, no edema  Psychiatric: Normal mood, normal affect  Neuro: moves all extremities, grossly intact  Skin: No rashes or lesions    LABS:                                    9.9    6.36  )-----------( 184      ( 15 Mar 2022 07:56 )             33.0     03-15    137  |  106  |  15  ----------------------------<  134<H>  4.4   |  26  |  0.50    Ca    8.3<L>      15 Mar 2022 07:56      RADIOLOGY & ADDITIONAL STUDIES:    
[[employer]: [place] - [id number]]    Patient is a 66F community-ambulator with assistive devices (alternates between cane and rolling walker at home; rollator in the community) who presented to the [facility] ED after a mechanical fall on 3/13 and with complaints of head/face pain and difficult ambulation. Patient states that she was walking with her cane in a parking garage when her legs suddenly became weak and she fell. Endorses head strike after falling face first, hitting both her nose and her forehead. Patient denies LOC but adds that she does not remember exactly how she fell.  Patient was diagnosed with a left nasal bone fracture. Orthopaedic Team consulted for Left ankle pain secondary to the aforementioned fall. Patient states that her ankle has been swollen and painful since she fell, and that she has only been able to ambulate with PT for four steps as of today. The patient also endorses radicular pain in the LLE extending from the buttocks to the toes, similar to radicular pain she experienced prior to undergoing an L4-5 laminectomy (Dr. Shoemaker, 2019); this pain is also accompanied by a recurrence of numbness, as well as her baseline chronic weakness and tingling in the LLE. The patient also endorses pain in the head, bilateral shoulder soreness, Left elbow pain, and tingling in the bilateral hands. Denies having any other pain elsewhere. Denies any saddle anesthesia or bladder/bowel incontinence. Denies any previous orthopaedic history other than the aformentioned spine surgery. No other orthopaedic concerns at this time.    PAST MEDICAL & SURGICAL HISTORY:  DM (diabetes mellitus)    HTN (hypertension)    HLD (hyperlipidemia)    No significant past surgical history        No Known Allergies      PHYSICAL EXAM:  T(C): 36.8 (03-14-22 @ 09:00), Max: 36.8 (03-13-22 @ 19:51)  HR: 70 (03-14-22 @ 09:00) (68 - 73)  BP: 112/34 (03-14-22 @ 09:00) (112/34 - 123/41)  RR: 18 (03-14-22 @ 09:00) (18 - 20)  SpO2: 97% (03-14-22 @ 09:00) (97% - 99%)    Gen: NAD, Resting comfortably    LLE:  Skin intact; Swelling, ecchymoses, and tenderness to palpation over the lateral > medial malleolus; No erythema, warmth, or fluctuance.   Mild-moderate bony tenderness to palpation most pronounced over the malleoli, lateral > medial.   +TA/EHL/FHL/GSC.   +SILT L3-S1.   + DP.   Compartments soft and compressible.   No calf tenderness.     Secondary Survey:   RUE: No TTP over bony prominences, SILT, palpable pulses, full/painless range of motion, compartments soft; Mild tenderness with ROM of the shoulder.   LUE: No TTP over bony prominences, SILT, palpable pulses, full/painless range of motion, compartments soft; Mild tenderness with ROM of the shoulder; Left TTP over the olecranon.  RLE: No TTP over bony prominences, SILT, palpable pulses, full/painless range of motion, compartments soft; Minimal chronic pain and stiffness endorsed over the Right ankle.   Spine: No bony tenderness. No palpable step-offs.       Imaging:    A/P:  Patient is a 66F s/p MF w/ Left ankle pain, no obvious fracture on XR imaging.      - Multimodal Analgesia - Recommend low dose opioids and acetaminophen as tolerated.   - WBAT RLE with assitive device(s).   - FU XR L Elbow.   - Compressive dressings.   - DVT Ppx: Per Primary Team.   - PT/OT.   - Ice and elevate as tolerated.   - No acute orthopaedic surgical intervention indicated at this time.  - Will discuss any indication for further advanced imaging with attending.    - Recommend follow up w/ Dr. Park as an outpatient. Call office to schedule appointment.  - Will discuss with Dr. Park and advise of any changes to the above plan.

## 2022-03-16 NOTE — DISCHARGE NOTE PROVIDER - ATTENDING DISCHARGE PHYSICAL EXAMINATION:
Patient is seen and examined at bedside. Vomiting resolved, seen by GI- continue PPI, for outpatient EGD. Will be measured for ankle brace, then can be discharged to [facility]. Agree with above discharge documentation

## 2022-03-16 NOTE — DISCHARGE NOTE PROVIDER - PROVIDER TOKENS
PROVIDER:[TOKEN:[[id number]],FOLLOWUP:[2 weeks]],PROVIDER:[TOKEN:[[id number]],FOLLOWUP:[1 month],ESTABLISHEDPATIENT:[T]],PROVIDER:[TOKEN:[[id number]],FOLLOWUP:[2 weeks]]

## 2022-03-16 NOTE — DISCHARGE NOTE PROVIDER - NSDCFUADDAPPT_GEN_ALL_CORE_FT
follow up with Dr. Park in regards to your left ankle sprain when discharged from rehab    Follow up with your Primary doctor in regards to your hospitalization    Follow up with Dr Hall, gastroenterologist in regards to your frequent nausea/vomiting

## 2022-03-16 NOTE — DISCHARGE NOTE PROVIDER - NSDCFUADDINST_GEN_ALL_CORE_FT
AFO brace to left ankle when ambulating  ice to left ankle as needed  follow up vin wiley when discharged from [facility]

## 2022-03-16 NOTE — DISCHARGE NOTE PROVIDER - CARE PROVIDERS DIRECT ADDRESSES
,[email or web address],DirectAddress_Unknown,DirectAddress_Unknown

## 2022-03-16 NOTE — DISCHARGE NOTE NURSING/CASE MANAGEMENT/SOCIAL WORK - NSDCPEFALRISK_GEN_ALL_CORE
For information on Fall & Injury Prevention, visit: [email or web address] OR  [email or web address] OR  https://www.cdc.gov/steadi/patient.html

## 2022-03-16 NOTE — CONSULT NOTE ADULT - ATTENDING COMMENTS
pt seen and examined with PA   plan discussed    outpatient eval for EGD if GERD symptoms persist  no GI objection to d/c  cont PPI

## 2022-03-16 NOTE — CONSULT NOTE ADULT - ASSESSMENT
Called to see for vomiting. Pt seen and interviewed in Romansh with aide at bedside.  Pt reports 1-2 episodes of nocturnal regurgitation. Pt denies that she has had a Endoscopy or Colonoscopy in the past. Admits to being currently on a PPI.     Plan:  Continue Pantoprazole 40 mg daily  Can follow up for Endoscopy as out patient once other medical issues have resolved

## 2022-03-16 NOTE — PROGRESS NOTE ADULT - SUBJECTIVE AND OBJECTIVE BOX
Patient evaluated measured and fitted for Left ankle  orthosis to stabilize L LE post L ankle sprain to aid in healing   and recovery allow patient to weight bear ordered by  Orthopedics delivered by [facility] [phone]

## 2022-03-16 NOTE — PROGRESS NOTE ADULT - REASON FOR ADMISSION
Mechanical Fall  Difficulty ambulating  Weakness

## 2022-03-16 NOTE — DISCHARGE NOTE PROVIDER - CARE PROVIDER_API CALL
Cuate Park (DO)  Orthopaedic Surgery  [address] [place] [address]  Phone: [phone]  Fax: [phone]  Follow Up Time: 2 weeks    [place] [address]  Phone: [phone]  Fax: [phone]  Established Patient  Follow Up Time: 1 month    iRck Hall)  Gastroenterology; Internal Medicine  [address] [place] [address]  Phone: [phone]  Fax: [phone]  Follow Up Time: 2 weeks

## 2022-03-18 DIAGNOSIS — S93.402A SPRAIN OF UNSPECIFIED LIGAMENT OF LEFT ANKLE, INITIAL ENCOUNTER: ICD-10-CM

## 2022-03-18 DIAGNOSIS — K21.9 GASTRO-ESOPHAGEAL REFLUX DISEASE WITHOUT ESOPHAGITIS: ICD-10-CM

## 2022-03-18 DIAGNOSIS — S01.21XA LACERATION WITHOUT FOREIGN BODY OF NOSE, INITIAL ENCOUNTER: ICD-10-CM

## 2022-03-18 DIAGNOSIS — S02.2XXA FRACTURE OF NASAL BONES, INITIAL ENCOUNTER FOR CLOSED FRACTURE: ICD-10-CM

## 2022-03-18 DIAGNOSIS — D72.829 ELEVATED WHITE BLOOD CELL COUNT, UNSPECIFIED: ICD-10-CM

## 2022-03-18 DIAGNOSIS — E66.01 MORBID (SEVERE) OBESITY DUE TO EXCESS CALORIES: ICD-10-CM

## 2022-03-18 DIAGNOSIS — K80.20 CALCULUS OF GALLBLADDER WITHOUT CHOLECYSTITIS WITHOUT OBSTRUCTION: ICD-10-CM

## 2022-03-18 DIAGNOSIS — R26.2 DIFFICULTY IN WALKING, NOT ELSEWHERE CLASSIFIED: ICD-10-CM
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
## 2022-03-18 DIAGNOSIS — Z79.84 LONG TERM (CURRENT) USE OF ORAL HYPOGLYCEMIC DRUGS: ICD-10-CM

## 2022-03-18 DIAGNOSIS — M25.572 PAIN IN LEFT ANKLE AND JOINTS OF LEFT FOOT: ICD-10-CM

## 2022-03-18 DIAGNOSIS — I10 ESSENTIAL (PRIMARY) HYPERTENSION: ICD-10-CM

## 2022-03-18 DIAGNOSIS — E11.9 TYPE 2 DIABETES MELLITUS WITHOUT COMPLICATIONS: ICD-10-CM

## 2022-03-18 DIAGNOSIS — M54.30 SCIATICA, UNSPECIFIED SIDE: ICD-10-CM
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
## 2022-03-18 DIAGNOSIS — R11.10 VOMITING, UNSPECIFIED: ICD-10-CM

## 2022-03-18 DIAGNOSIS — E78.5 HYPERLIPIDEMIA, UNSPECIFIED: ICD-10-CM

## 2022-03-18 RX ORDER — OXYCODONE HYDROCHLORIDE 5 MG/1
1 TABLET ORAL
Qty: 60 | Refills: 0
Start: 2022-03-18 | End: 2022-03-27

## 2022-04-01 RX ORDER — ENOXAPARIN SODIUM 100 MG/ML
40 INJECTION SUBCUTANEOUS
Qty: 400 | Refills: 0
Start: 2022-04-01 | End: 2022-04-10

## 2022-04-01 RX ORDER — SITAGLIPTIN 50 MG/1
1 TABLET, FILM COATED ORAL
Qty: 0 | Refills: 0 | DISCHARGE

## 2022-04-01 RX ORDER — LISINOPRIL 2.5 MG/1
1 TABLET ORAL
Qty: 14 | Refills: 0
Start: 2022-04-01 | End: 2022-04-14

## 2022-04-01 RX ORDER — EMPAGLIFLOZIN 10 MG/1
1 TABLET, FILM COATED ORAL
Qty: 0 | Refills: 0 | DISCHARGE

## 2022-04-01 RX ORDER — ATORVASTATIN CALCIUM 80 MG/1
1 TABLET, FILM COATED ORAL
Qty: 14 | Refills: 0
Start: 2022-04-01 | End: 2022-04-14

## 2022-04-01 RX ORDER — CYCLOBENZAPRINE HYDROCHLORIDE 10 MG/1
1 TABLET, FILM COATED ORAL
Qty: 20 | Refills: 0
Start: 2022-04-01

## 2022-04-01 RX ORDER — SENNA PLUS 8.6 MG/1
2 TABLET ORAL
Qty: 28 | Refills: 0
Start: 2022-04-01 | End: 2022-04-14

## 2022-04-01 RX ORDER — METOPROLOL TARTRATE 50 MG
1 TABLET ORAL
Qty: 10 | Refills: 0
Start: 2022-04-01 | End: 2022-04-10

## 2022-04-01 RX ORDER — EMPAGLIFLOZIN 10 MG/1
1 TABLET, FILM COATED ORAL
Qty: 14 | Refills: 0
Start: 2022-04-01 | End: 2022-04-14

## 2022-04-01 RX ORDER — PANTOPRAZOLE SODIUM 20 MG/1
1 TABLET, DELAYED RELEASE ORAL
Qty: 14 | Refills: 0
Start: 2022-04-01 | End: 2022-04-14

## 2022-04-01 RX ORDER — LISINOPRIL 2.5 MG/1
1 TABLET ORAL
Qty: 0 | Refills: 0 | DISCHARGE

## 2022-04-01 RX ORDER — EMPAGLIFLOZIN 10 MG/1
1 TABLET, FILM COATED ORAL
Qty: 14 | Refills: 0 | DISCHARGE
Start: 2022-04-01 | End: 2022-04-14

## 2022-04-01 RX ORDER — SITAGLIPTIN 50 MG/1
1 TABLET, FILM COATED ORAL
Qty: 14 | Refills: 0
Start: 2022-04-01 | End: 2022-04-14

## 2022-04-01 RX ORDER — AMLODIPINE BESYLATE 2.5 MG/1
1 TABLET ORAL
Qty: 14 | Refills: 0
Start: 2022-04-01 | End: 2022-04-14

## 2022-04-01 RX ORDER — METFORMIN HYDROCHLORIDE 850 MG/1
1 TABLET ORAL
Qty: 28 | Refills: 0
Start: 2022-04-01 | End: 2022-04-14

## 2022-04-06 ENCOUNTER — APPOINTMENT (OUTPATIENT)
Dept: ORTHOPEDIC SURGERY | Facility: CLINIC | Age: 67
End: 2022-04-06
Payer: MEDICARE

## 2022-04-06 ENCOUNTER — NON-APPOINTMENT (OUTPATIENT)
Age: 67
End: 2022-04-06

## 2022-04-06 DIAGNOSIS — S01.81XA LACERATION W/OUT FOREIGN BODY OF OTHER PART OF HEAD, INITIAL ENCOUNTER: ICD-10-CM

## 2022-04-06 DIAGNOSIS — S99.912A UNSPECIFIED INJURY OF LEFT ANKLE, INITIAL ENCOUNTER: ICD-10-CM

## 2022-04-06 PROCEDURE — 99203 OFFICE O/P NEW LOW 30 MIN: CPT

## 2022-04-06 NOTE — DISCUSSION/SUMMARY
[de-identified] : Assessment: Left ankle injury\par \par Plan:\par 1. Physical Therapy.\par 2. NSAIDs/Tylenol as needed for pain. \par 3. Return to normal activities as tolerated. \par 4. Continue with ice and heat therapy. \par 5. All questions answered.  Follow-up as needed. If pain persists consider advanced imaging in the future.  The patient understood the treatment plan.

## 2022-04-06 NOTE — PHYSICAL EXAM
[de-identified] : General: Alert and oriented x3. In no acute distress. Pleasant in nature with a normal affect. No apparent respiratory distress.\par \par Left Ankle Exam\par Skin: Clean, dry, intact\par Inspection: No obvious malalignment, no swelling, no effusion; no lymphadenopathy\par Pulses: 2+ DP/PT pulses\par ROM:10 degrees of dorsiflexion, 40 degrees of plantarflexion, 10 degrees of subtalar motion\par Tenderness: No tenderness over the lateral malleolus, no CFL/+ATFL/PTFL pain. No medial malleolus pain, no deltoid ligament pain. No proximal fibular pain. No heel pain.\par Stability: Negative anterior/posterior drawer.\par Strength: 5/5 TA/GS/EHL\par Neuro: In tact to light touch throughout\par Additional tests: Negative Mortons test, Negative syndesmosis squeeze test.\par \par *Facial sutures removed without complication. [de-identified] : Xrays of the left ankle obtained from outside facility on 3/14/2022 and reviewed in the office today, 04/06/2022 , revealed: No acute fractures. \par  \par CT of the left ankle obtained from outside facility on 3/14/2022  and reviewed in the office today, 04/06/2022 , revealed: No acute fractures. \par

## 2022-04-06 NOTE — ADDENDUM
[FreeTextEntry1] : I, Rosa Wynn, acted solely as a scribe for Dr. Cuate Park on this date 04/06/2022.\par \par All medical record entries made by the Scribe were at my, Dr. Cuate Park, direction and personally dictated by me on 04/06/2022 . I have reviewed the chart and agree that the record accurately reflects my personal performance of the history, physical exam, assessment and plan. I have also personally directed, reviewed, and agreed with the chart.	\par

## 2022-04-06 NOTE — HISTORY OF PRESENT ILLNESS
[FreeTextEntry1] : The patient is a 66 year old female presenting for an initial evaluation of nasal injury sustained on 3/14/2022. The patient states that she fell and hit her house. She was evaluated for this, where x-rays confirmed a fracture. She was told to follow up Dr. Park for suture removal and presents today for further evaluation of the same. She is also concerned with left ankle pain. She has a history of frequent falls, and was previous diagnosed with a left ankle sprain.  She is also here to have the left ankle evaluated after she accidentally rolled her ankle during the fall.  Using a walker for walking assistance.  No other complaints.\par \par The patient is accompanied by their distant relative.

## 2022-04-06 NOTE — REASON FOR VISIT
[Initial Visit] : an initial visit for [Family Member] : family member [FreeTextEntry2] : left ankle pain, facial laceration

## 2022-10-20 ENCOUNTER — EMERGENCY (EMERGENCY)
Facility: HOSPITAL | Age: 67
LOS: 0 days | Discharge: HOME | End: 2022-10-20
Attending: EMERGENCY MEDICINE
Payer: MEDICARE

## 2022-10-20 VITALS
HEART RATE: 76 BPM | OXYGEN SATURATION: 98 % | RESPIRATION RATE: 18 BRPM | TEMPERATURE: 99 F | DIASTOLIC BLOOD PRESSURE: 86 MMHG | SYSTOLIC BLOOD PRESSURE: 187 MMHG

## 2022-10-20 VITALS
DIASTOLIC BLOOD PRESSURE: 65 MMHG | HEART RATE: 72 BPM | RESPIRATION RATE: 18 BRPM | HEIGHT: 62 IN | SYSTOLIC BLOOD PRESSURE: 155 MMHG | TEMPERATURE: 98 F | WEIGHT: 199.96 LBS | OXYGEN SATURATION: 96 %

## 2022-10-20 DIAGNOSIS — I10 ESSENTIAL (PRIMARY) HYPERTENSION: ICD-10-CM

## 2022-10-20 DIAGNOSIS — E11.9 TYPE 2 DIABETES MELLITUS WITHOUT COMPLICATIONS: ICD-10-CM

## 2022-10-20 DIAGNOSIS — R11.0 NAUSEA: ICD-10-CM
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
## 2022-10-20 DIAGNOSIS — E78.5 HYPERLIPIDEMIA, UNSPECIFIED: ICD-10-CM

## 2022-10-20 DIAGNOSIS — T65.91XA TOXIC EFFECT OF UNSPECIFIED SUBSTANCE, ACCIDENTAL (UNINTENTIONAL), INITIAL ENCOUNTER: ICD-10-CM

## 2022-10-20 DIAGNOSIS — R25.2 CRAMP AND SPASM: ICD-10-CM

## 2022-10-20 DIAGNOSIS — Z79.84 LONG TERM (CURRENT) USE OF ORAL HYPOGLYCEMIC DRUGS: ICD-10-CM
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
## 2022-10-20 LAB
ALBUMIN SERPL ELPH-MCNC: 3.5 G/DL — SIGNIFICANT CHANGE UP (ref 3.3–5)
ALP SERPL-CCNC: 93 U/L — SIGNIFICANT CHANGE UP (ref 40–120)
ALT FLD-CCNC: 33 U/L — SIGNIFICANT CHANGE UP (ref 12–78)
ANION GAP SERPL CALC-SCNC: 7 MMOL/L — SIGNIFICANT CHANGE UP (ref 5–17)
AST SERPL-CCNC: 37 U/L — SIGNIFICANT CHANGE UP (ref 15–37)
BASOPHILS # BLD AUTO: 0.03 K/UL — SIGNIFICANT CHANGE UP (ref 0–0.2)
BASOPHILS NFR BLD AUTO: 0.5 % — SIGNIFICANT CHANGE UP (ref 0–2)
BILIRUB SERPL-MCNC: 0.3 MG/DL — SIGNIFICANT CHANGE UP (ref 0.2–1.2)
BUN SERPL-MCNC: 25 MG/DL — HIGH (ref 7–23)
CALCIUM SERPL-MCNC: 9.4 MG/DL — SIGNIFICANT CHANGE UP (ref 8.5–10.1)
CHLORIDE SERPL-SCNC: 107 MMOL/L — SIGNIFICANT CHANGE UP (ref 96–108)
CO2 SERPL-SCNC: 23 MMOL/L — SIGNIFICANT CHANGE UP (ref 22–31)
CREAT SERPL-MCNC: 0.87 MG/DL — SIGNIFICANT CHANGE UP (ref 0.5–1.3)
EGFR: 74 ML/MIN/1.73M2 — SIGNIFICANT CHANGE UP
EOSINOPHIL # BLD AUTO: 0.15 K/UL — SIGNIFICANT CHANGE UP (ref 0–0.5)
EOSINOPHIL NFR BLD AUTO: 2.4 % — SIGNIFICANT CHANGE UP (ref 0–6)
GLUCOSE SERPL-MCNC: 158 MG/DL — HIGH (ref 70–99)
HCT VFR BLD CALC: 37.9 % — SIGNIFICANT CHANGE UP (ref 34.5–45)
HGB BLD-MCNC: 12.1 G/DL — SIGNIFICANT CHANGE UP (ref 11.5–15.5)
IMM GRANULOCYTES NFR BLD AUTO: 0.2 % — SIGNIFICANT CHANGE UP (ref 0–0.9)
LIDOCAIN IGE QN: 116 U/L — SIGNIFICANT CHANGE UP (ref 73–393)
LYMPHOCYTES # BLD AUTO: 1.83 K/UL — SIGNIFICANT CHANGE UP (ref 1–3.3)
LYMPHOCYTES # BLD AUTO: 28.9 % — SIGNIFICANT CHANGE UP (ref 13–44)
MAGNESIUM SERPL-MCNC: 1.8 MG/DL — SIGNIFICANT CHANGE UP (ref 1.6–2.6)
MCHC RBC-ENTMCNC: 27.4 PG — SIGNIFICANT CHANGE UP (ref 27–34)
MCHC RBC-ENTMCNC: 31.9 GM/DL — LOW (ref 32–36)
MCV RBC AUTO: 85.9 FL — SIGNIFICANT CHANGE UP (ref 80–100)
MONOCYTES # BLD AUTO: 0.56 K/UL — SIGNIFICANT CHANGE UP (ref 0–0.9)
MONOCYTES NFR BLD AUTO: 8.8 % — SIGNIFICANT CHANGE UP (ref 2–14)
NEUTROPHILS # BLD AUTO: 3.76 K/UL — SIGNIFICANT CHANGE UP (ref 1.8–7.4)
NEUTROPHILS NFR BLD AUTO: 59.2 % — SIGNIFICANT CHANGE UP (ref 43–77)
PLATELET # BLD AUTO: 217 K/UL — SIGNIFICANT CHANGE UP (ref 150–400)
POTASSIUM SERPL-MCNC: 5.4 MMOL/L — HIGH (ref 3.5–5.3)
POTASSIUM SERPL-SCNC: 5.4 MMOL/L — HIGH (ref 3.5–5.3)
PROT SERPL-MCNC: 7.2 GM/DL — SIGNIFICANT CHANGE UP (ref 6–8.3)
RBC # BLD: 4.41 M/UL — SIGNIFICANT CHANGE UP (ref 3.8–5.2)
RBC # FLD: 14.5 % — SIGNIFICANT CHANGE UP (ref 10.3–14.5)
SODIUM SERPL-SCNC: 137 MMOL/L — SIGNIFICANT CHANGE UP (ref 135–145)
WBC # BLD: 6.34 K/UL — SIGNIFICANT CHANGE UP (ref 3.8–10.5)
WBC # FLD AUTO: 6.34 K/UL — SIGNIFICANT CHANGE UP (ref 3.8–10.5)

## 2022-10-20 PROCEDURE — 93010 ELECTROCARDIOGRAM REPORT: CPT

## 2022-10-20 PROCEDURE — 85025 COMPLETE CBC W/AUTO DIFF WBC: CPT

## 2022-10-20 PROCEDURE — 93005 ELECTROCARDIOGRAM TRACING: CPT

## 2022-10-20 PROCEDURE — 96374 THER/PROPH/DIAG INJ IV PUSH: CPT

## 2022-10-20 PROCEDURE — 36415 COLL VENOUS BLD VENIPUNCTURE: CPT

## 2022-10-20 PROCEDURE — 99284 EMERGENCY DEPT VISIT MOD MDM: CPT | Mod: 25

## 2022-10-20 PROCEDURE — 80053 COMPREHEN METABOLIC PANEL: CPT

## 2022-10-20 PROCEDURE — 99285 EMERGENCY DEPT VISIT HI MDM: CPT

## 2022-10-20 PROCEDURE — 83690 ASSAY OF LIPASE: CPT

## 2022-10-20 PROCEDURE — 96375 TX/PRO/DX INJ NEW DRUG ADDON: CPT

## 2022-10-20 PROCEDURE — 96361 HYDRATE IV INFUSION ADD-ON: CPT

## 2022-10-20 PROCEDURE — 83735 ASSAY OF MAGNESIUM: CPT

## 2022-10-20 RX ORDER — IBUPROFEN 200 MG
600 TABLET ORAL ONCE
Refills: 0 | Status: COMPLETED | OUTPATIENT
Start: 2022-10-20 | End: 2022-10-20

## 2022-10-20 RX ORDER — ONDANSETRON 8 MG/1
4 TABLET, FILM COATED ORAL ONCE
Refills: 0 | Status: COMPLETED | OUTPATIENT
Start: 2022-10-20 | End: 2022-10-20

## 2022-10-20 RX ORDER — SODIUM CHLORIDE 9 MG/ML
1000 INJECTION INTRAMUSCULAR; INTRAVENOUS; SUBCUTANEOUS ONCE
Refills: 0 | Status: COMPLETED | OUTPATIENT
Start: 2022-10-20 | End: 2022-10-20

## 2022-10-20 RX ORDER — ACETAMINOPHEN 500 MG
1000 TABLET ORAL ONCE
Refills: 0 | Status: COMPLETED | OUTPATIENT
Start: 2022-10-20 | End: 2022-10-20

## 2022-10-20 RX ADMIN — SODIUM CHLORIDE 2000 MILLILITER(S): 9 INJECTION INTRAMUSCULAR; INTRAVENOUS; SUBCUTANEOUS at 09:12

## 2022-10-20 RX ADMIN — SODIUM CHLORIDE 1000 MILLILITER(S): 9 INJECTION INTRAMUSCULAR; INTRAVENOUS; SUBCUTANEOUS at 10:20

## 2022-10-20 RX ADMIN — ONDANSETRON 4 MILLIGRAM(S): 8 TABLET, FILM COATED ORAL at 09:12

## 2022-10-20 RX ADMIN — Medication 400 MILLIGRAM(S): at 09:12

## 2022-10-20 RX ADMIN — Medication 600 MILLIGRAM(S): at 12:53

## 2022-10-20 NOTE — ED ADULT TRIAGE NOTE - IDEAL BODY WEIGHT(KG)
Chief Complaint   Patient presents with     Sleep Problem     Questions       Initial BP (!) 140/91   Pulse 68   Resp 16   Ht 1.829 m (6')   Wt 83 kg (183 lb)   SpO2 97%   BMI 24.82 kg/m   Estimated body mass index is 24.82 kg/m  as calculated from the following:    Height as of this encounter: 1.829 m (6').    Weight as of this encounter: 83 kg (183 lb).    Medication Reconciliation: complete     ESS 2  Neck 38cm  Mariya Kelly MA        
50

## 2022-10-20 NOTE — ED ADULT TRIAGE NOTE - CHIEF COMPLAINT QUOTE
pt. c/o abd. pain with nausea s/p drinking fabric softener on accident. pt. rents room in house with multiple people and has water bottles filled with fabric softener and regular water bottles next to each other in her room and the lights were off and she drank from it accidently. pt. states its easier for her to carry a small bottles to do laundry then the big jug. no vomiting, drank approx 100cc.

## 2022-10-20 NOTE — ED PROVIDER NOTE - OBJECTIVE STATEMENT
Patient is a 67-year-old female  [id number] was used to obtain history, patient stated that she lives in a house with multiple people and they do feel water bottles up with laundry detergent and when she went to go drink water today she accidentally took a couple sips of laundry detergent instead when she realized the error she spit it out immediately.  She stated that she is having some nausea and abdominal cramps since this occurred.  She denies any intention to harm herself and stated that this was all accidental.  She denies any chest pain, shortness of breath.  No prior abdominal surgeries.

## 2022-10-20 NOTE — ED ADULT TRIAGE NOTE - NS ED NURSE AMBULANCES
[facility] patient endorses shortness of breath  96% on 3L NC  awaiting ID consult  started on dexamethasone 6mg IVP daily   hold off on remdesivir for now in setting of shanta, creatinine 1.5 see plan as above

## 2022-10-20 NOTE — ED ADULT TRIAGE NOTE - PAIN RATING/NUMBER SCALE (0-10): REST
Peripheral Nerve Block Patient Preparation  Location: PACU  Preanesthetic Checklist:  Patient Identified, Risks and Benefits Discussed, Monitors and Equipment Checked, Patient Hemodynamically Stable, Timeout Performed, Site Marked and Post-Op Pain Mgt at Surgeon Request  Patient Position:  Supine  Sedation:  Midazolam  2 mg   Fentanyl  25 mcg  Monitor(s) Used:  EKG, NIBP and Pulse Oximetry  Oxygen Supplement:  Nasal Cannula  Site Prep:  Cap, Mask, Sterile Gloves and Chloroprep  Requesting Surgeon: Janna      Peripheral Nerve Block Details  Peripheral Nerve Block Type:  Adductor Canal, Ultrasound Guided  Block Designation: Right  Local Infiltration:  1% Lidocaine  Local Infiltration Volume:  1mL  Stimulating Needle:  No    Needle Gauge:  22 G  Needle Length:  8cm  Needle Localization: Ultrasound Guidance     Local Anesthetic:  Ropivacaine  Local Anesthetic Concentration:  0.5%  Local Anesthetic Volume:  30mL  Epinephrine:  None  Aspiration:  Negative  Incremental Injection:  Yes  Paresthesias:  No  Pain on Injection:  No  Catheter Used:  No    Peripheral Nerve Block Note              
5

## 2022-10-20 NOTE — ED ADULT NURSE REASSESSMENT NOTE - NS ED NURSE REASSESS COMMENT FT1
Patient discharged and states she needs transportation as she uses a walker to get around and would not be able to get into her home. Patient complained of headache as well. MD Evans made aware. Plan of care discussed with patient.

## 2022-10-20 NOTE — ED ADULT NURSE NOTE - OBJECTIVE STATEMENT
Patient presents to the emergency room with complaints of abdominal pain. Patient states she keeps fabric softener in water bottles as she states it is easier for her to carry that way. This morning she reached over to grab a water bottle thinking it was water and she grabbed one that was filled with fabric softener and drank approx 100cc. Patient denies SI/HI at this time. Patient complains of abdominal pain and stated she was naueous at home and felt a little dizzy. Patient denies any chest pain, shortness of breath, vomiting, diarrhea. Patient reports left leg pain due to a fall back in March.

## 2022-10-20 NOTE — ED PROVIDER NOTE - PATIENT PORTAL LINK FT
You can access the FollowMyHealth Patient Portal offered by [facility] by registering at the following website: [email or web address]. By joining [employer]’s FollowMyHealth portal, you will also be able to view your health information using other applications (apps) compatible with our system.

## 2022-10-20 NOTE — ED PROVIDER NOTE - CLINICAL SUMMARY MEDICAL DECISION MAKING FREE TEXT BOX
EKG was normal sinus rhythm at a rate of 71, no acute ST segment or T wave changes, it is EKG, labs obtained did not reveal any acute process, patient has been given Zofran and IV fluids here, she is able to tolerate a p.o. challenge, given follow-up, return precautions and discharged in stable condition.

## 2022-10-20 NOTE — ED PROVIDER NOTE - NS ED ROS FT
Constitutional: No fever or chills  Eyes: No visual changes  HEENT: No throat pain  CV: No chest pain  Resp: No SOB no cough  GI:+ abd cramps, +nausea  : No dysuria  MSK: No musculoskeletal pain  Skin: No rash  Neuro: No headache

## 2022-11-22 NOTE — PHYSICAL THERAPY INITIAL EVALUATION ADULT - PATIENT/FAMILY/SIGNIFICANT OTHER GOALS STATEMENT, PT EVAL
65yo male with PMH of BPH, HTN s/p prostate enucleation POD#0
67yo male with PMH of BPH, HTN s/p prostate enucleation POD#1
Patient considering surgical intervention.

## 2023-01-20 NOTE — PATIENT PROFILE ADULT - CHOOSE INDICATION TO IMMUNIZE (AN ORDER WILL BE GENERATED WHEN THIS NOTE IS SAVED):
Continue eliquis 5 mg twice a day     Follow up with neuro-oncologist and follow their recommendations     Use compression stockings thigh high 20-30 mm hg day time and elevate left leg when able     Please quit smoking      Follow up with me in 6 months   
Patient is not pregnant (male or female)

## 2024-02-08 NOTE — ED PROVIDER NOTE - DISPOSITION TYPE
Pt presents with c/o fever, sore throat, stomach ache, headache. Symptom onset was this morning.          DISCHARGE

## 2024-02-18 ENCOUNTER — EMERGENCY (EMERGENCY)
Facility: HOSPITAL | Age: 69
LOS: 0 days | Discharge: HOME | End: 2024-02-18
Attending: STUDENT IN AN ORGANIZED HEALTH CARE EDUCATION/TRAINING PROGRAM
Payer: MEDICARE

## 2024-02-18 VITALS
DIASTOLIC BLOOD PRESSURE: 76 MMHG | HEART RATE: 73 BPM | SYSTOLIC BLOOD PRESSURE: 179 MMHG | OXYGEN SATURATION: 94 % | TEMPERATURE: 98 F | RESPIRATION RATE: 19 BRPM

## 2024-02-18 VITALS
TEMPERATURE: 98 F | DIASTOLIC BLOOD PRESSURE: 52 MMHG | RESPIRATION RATE: 19 BRPM | OXYGEN SATURATION: 97 % | SYSTOLIC BLOOD PRESSURE: 144 MMHG | HEART RATE: 65 BPM

## 2024-02-18 DIAGNOSIS — J06.9 ACUTE UPPER RESPIRATORY INFECTION, UNSPECIFIED: ICD-10-CM

## 2024-02-18 DIAGNOSIS — B97.89 OTHER VIRAL AGENTS AS THE CAUSE OF DISEASES CLASSIFIED ELSEWHERE: ICD-10-CM

## 2024-02-18 DIAGNOSIS — E78.5 HYPERLIPIDEMIA, UNSPECIFIED: ICD-10-CM

## 2024-02-18 DIAGNOSIS — I10 ESSENTIAL (PRIMARY) HYPERTENSION: ICD-10-CM

## 2024-02-18 DIAGNOSIS — R68.83 CHILLS (WITHOUT FEVER): ICD-10-CM

## 2024-02-18 DIAGNOSIS — R05.9 COUGH, UNSPECIFIED: ICD-10-CM

## 2024-02-18 DIAGNOSIS — E11.9 TYPE 2 DIABETES MELLITUS WITHOUT COMPLICATIONS: ICD-10-CM

## 2024-02-18 PROCEDURE — 99283 EMERGENCY DEPT VISIT LOW MDM: CPT

## 2024-02-18 PROCEDURE — 93010 ELECTROCARDIOGRAM REPORT: CPT

## 2024-02-18 PROCEDURE — 99284 EMERGENCY DEPT VISIT MOD MDM: CPT

## 2024-02-18 PROCEDURE — 93005 ELECTROCARDIOGRAM TRACING: CPT

## 2024-02-18 RX ADMIN — Medication 200 MILLIGRAM(S): at 15:05

## 2024-02-18 NOTE — ED STATDOCS - NSFOLLOWUPINSTRUCTIONS_ED_ALL_ED_FT
Infección de las vías respiratorias superiores en adultos  Upper Respiratory Infection, Adult  Scooter infección de las vías respiratorias superiores (IVRS) es scooter infección viral común de la nariz, garganta y de las vías respiratorias superiores que conducen el aire a los pulmones. El tipo más común de IVRS es el resfrío común. Las IVRS generalmente mejoran solas, sin tratamiento médico.    ¿Cuáles son las causas?  La causa es un virus. Se puede contagiar kristine virus:  Al aspirar las gotitas que scooter persona infectada elimina al toser o estornudar.  Tocar algo que estuvo expuesto al virus (está contaminado) y luego tocarse la boca, la nariz o los ojos.  ¿Qué incrementa el riesgo?  Es más propenso a contraer scooter IVRS si:  Es muy pequeño o de edad muy avanzada.  Tiene contacto cercano con otros, fifi en el trabajo, la escuela o un centro de atención médica.  Fuma.  Tiene scooter enfermedad cardíaca o pulmonar a juana plazo (crónica).  Tiene debilitado el sistema encargado de combatir las enfermedades (sistema inmunitario).  Tiene asma o alergias nasales.  Está sufriendo mucho estrés.  Tiene un déficit nutricional.  ¿Cuáles son los signos o síntomas?  La IVRS suele presentar alguno de los siguientes síntomas:  Secreción nasal o nariz tapada (congestión).  Tos.  Estornudos.  Dolor de garganta.  Dolor de tatyana.  Fatiga.  Fiebre.  Pérdida del apetito.  Dolor en la frente, detrás de los ojos y por encima de los pómulos (dolor sinusal).  Krys musculares.  Enrojecimiento o irritación de los ojos.  Presión en los oídos o la geovany.  ¿Cómo se diagnostica?  Esta afección se puede diagnosticar en función de los antecedentes médicos, los síntomas y un examen físico. El médico puede usar un hisopo para benton scooter muestra de mucosidad de la nariz (hisopado nasal). Esta muestra puede analizarse para determinar qué virus está provocando la enfermedad.    ¿Cómo se trata?  Las IVRS generalmente mejoran por sí solas en un período de entre 7 y 10 días. Los medicamentos no curan las IVRS, sean el médico puede recomendarle ciertos medicamentos para ayudar a aliviar los síntomas, fifi por ejemplo:  Medicamentos para la tos de venta thuan.  Antitusivos. Toser es un tipo de defensa contra scooter infección que ayuda a limpiar el sistema respiratorio, de modo que tome estos medicamentos solo según se lo recomiende el médico.  Medicamentos para bajar la fiebre.  Siga estas instrucciones en suggs casa:  Actividad    Descanse todo lo que sea necesario.  Si tiene fiebre, quédese en suggs casa, es decir, no vaya al trabajo o la escuela, hasta que no tenga fiebre o hasta que el médico le diga que la IVRS ya no se puede diseminar a otras personas (ya no contagia). El médico puede pedirle que use scooter máscara facial para evitar que disemine la infección.  Para aliviar los síntomas    Juan gárgaras con scooter mezcla de agua y sal 3 o 4 veces al día, o según sea necesario. Para preparar agua con sal, disuelva totalmente de ½ a 1 cucharadita (de 3 a 6 g) de sal en 1 taza (237 ml) de agua tibia.  Use un humidificador de aire frío para agregar humedad al aire. [place] puede ayudarlo a que respire mejor.  Comida y bebida    A comparison of three sample cups showing dark yellow, yellow, and pale yellow urine.  Cinthya suficiente líquido fifi para mantener la orina de color amarillo pálido.  Oark sopas y caldos transparentes.  Instrucciones generales    A sign showing that a person should not smoke.  Use los medicamentos de venta thuan y los recetados solamente fifi se lo haya indicado el médico. Estos incluyen medicamentos para el resfrío, para bajar la fiebre y antitusivos.  No consuma ningún producto que contenga nicotina o tabaco. Estos productos incluyen cigarrillos, tabaco para mascar y aparatos de vapeo, fifi los cigarrillos electrónicos. Si necesita ayuda para dejar de fumar, consulte al médico.  Aléjese del humo de otros fumadores.  Manténgase al día con todas las vacunas, incluso la vacuna anual (scooter vez al año) contra la gripe.  Concurra a todas las visitas de seguimiento. [place] es importante.  Cómo evitar contagiar la infección a otros    Washing hands with soap and water.  Las IVRS pueden ser contagiosas. Para evitar que la infección se propague, tome las siguientes medidas:  Lávese las marie con agua y jabón sarwat al menos 20 segundos. Use desinfectante para marie si no dispone de agua y jabón.  Evite tocarse la boca, la geovany, los ojos o la nariz.  Tosa o estornude en un pañuelo de papel o en suggs manga o codo en lugar de hacerlo en la mano o en el aire.  Comuníquese con un médico si:  Empeora en lugar de mejorar.  Tiene fiebre o escalofríos.  Tiene mucosidad marrón o shannon.  Tiene scooter secreción amarilla o marrón de la nariz.  Le duele la geovany, especialmente al inclinarse hacia adelante.  Tiene los ganglios del arun hinchados.  Siente dolor al tragar.  Tiene zonas mark en la parte de atrás de la garganta.  Solicite ayuda de inmediato si:  La falta de aire empeora.  Tiene síntomas intensos o persistentes de:  Dolor de tatyana.  Dolor de oído.  Dolor sinusal.  Dolor de pecho.  Tiene enfermedad pulmonar crónica junto con cualquiera de estos síntomas:  Emitir sonidos de silbidos agudos al respirar, más a menudo al exhalar (sibilancias).  Tos prolongada (más de 14 días).  Tos con vick.  Cambio en la mucosidad habitual.  Tiene rigidez en el arun.  Tiene cambios en:  La visión.  La audición.  El razonamiento.  El estado de ánimo.  Estos síntomas pueden indicar scooter emergencia. Solicite ayuda de inmediato. Llame al 911.  No espere a sagrario si los síntomas desaparecen.  No conduzca por irene propios medios hasta el hospital.  Resumen  Scooter infección de las vías respiratorias superiores (IVRS) es scooter infección común de la nariz, la garganta y las vías respiratorias superiores que conducen el aire a los pulmones.  La causa es un virus.  Las IVRS generalmente mejoran por sí solas en un período de entre 7 y 10 días.  Los medicamentos no curan las IVRS, sean el médico puede recomendarle ciertos medicamentos para ayudar a aliviar los síntomas.  Esta información no tiene fifi fin reemplazar el consejo del médico. Asegúrese de hacerle al médico cualquier pregunta que tenga.    Document Revised: 08/15/2022 Document Reviewed: 08/15/2022

## 2024-02-18 NOTE — ED ADULT NURSE NOTE - NSFALLUNIVINTERV_ED_ALL_ED
Bed/Stretcher in lowest position, wheels locked, appropriate side rails in place/Call bell, personal items and telephone in reach/Instruct patient to call for assistance before getting out of bed/chair/stretcher/Non-slip footwear applied when patient is off stretcher/[place] to call system/Physically safe environment - no spills, clutter or unnecessary equipment/Purposeful proactive rounding/Room/bathroom lighting operational, light cord in reach

## 2024-02-18 NOTE — ED ADULT NURSE NOTE - OBJECTIVE STATEMENT
pt ambulatory to triage with walker c/o cough x1 week. pt states she is unable to sleep due to cough. denies sob today. pmh DM, HTN. -allergies pt seen by pa and md in [place].

## 2024-02-18 NOTE — ED STATDOCS - OBJECTIVE STATEMENT
69 y/o female w/ a PMHx of HTN, HLD, and DM presents to the ED c/o cough and chills x1 week. Pt states she is unable to sleep d/t cough, states her throat feels very dry. Denies CP, SOB, fevers, chills, sick contacts, or recent travel. No other complaints at this time. NKDA. PCP: Dr. Rosado.   #[id number]

## 2024-02-18 NOTE — ED STATDOCS - PROGRESS NOTE DETAILS
69 y/o female w/ a PMHx of HTN, HLD, and DM presents to the ED c/o cough and chills x1 week. Pt states she is unable to sleep d/t cough, states her throat feels very dry. Denies CP, SOB, fevers, chills, sick contacts, or recent travel. No other complaints at this time. NKDA. PCP: Dr. Rosado.   #[id number] Will provide cough suppressant.  Kelly Likn PA-C

## 2024-02-18 NOTE — ED ADULT TRIAGE NOTE - CHIEF COMPLAINT QUOTE
pt ambulatory to triage with walker c/o cough x1 week. pt states she is unable to sleep due to cough. denies sob today. pmh DM, HTN. -allergies

## 2024-02-18 NOTE — ED STATDOCS - PATIENT PORTAL LINK FT
You can access the FollowMyHealth Patient Portal offered by [facility] by registering at the following website: [email or web address]. By joining [employer]’s FollowMyHealth portal, you will also be able to view your health information using other applications (apps) compatible with our system.

## 2024-02-18 NOTE — ED ADULT NURSE NOTE - PATIENT/CAREGIVER OFFERED  INTERPRETER SERVICES
08-12    143  |  104  |  21  ----------------------------<  207<H>  4.3   |  23  |  0.99    Ca    9.5      12 Aug 2017 15:29  Phos  4.3     08-12  Mg     2.5     08-12    Blood Gas Venous Comprehensive (08.12.17 @ 14:30)    Blood Gas Venous - Chloride: 107: Delta: 97 on 11/15/  Delta: 97 on 11/15/ mmol/L    Blood Gas Venous - Creatinine: 0.75: Delta: 0.46 on 11/15/  Delta: 0.46 on 11/15/ mg/dL    pH, Venous: 7.32 pH    Blood Gas Venous - Lactate: 1.5: Please note updated reference range. mmol/L    pCO2, Venous: 48 mmHg    pO2, Venous: 37 mmHg    HCO3, Venous: 22 mmol/L    Base Excess, Venous: -1.1: REFERENCE RANGE = -3 + 2 mmol/L mmol/L    Oxygen Saturation, Venous: 63.6 %    Blood Gas Venous - Sodium: 141 mmol/L    Blood Gas Venous - Potassium: 4.1 mmol/L    Blood Gas Venous - Glucose: 204    Blood Gas Venous - Hemoglobin: 12.8 g/dL    Blood Gas Venous - Hematocrit: 39.4 %    Hemoglobin A1C: 11.2    Urinalysis (08.12.17 @ 16:44)    Color: YELLOW    Urine Appearance: CLEAR    Glucose: 500    Bilirubin: NEGATIVE    Ketone - Urine: LARGE    Specific Gravity: 1.024    Blood: NEGATIVE    pH - Urine: 6.0    Protein, Urine: 30    Urobilinogen: NORMAL E.U.    Nitrite: NEGATIVE    Leukocyte Esterase Concentration: NEGATIVE    Red Blood Cell - Urine: 0-2    White Blood Cell - Urine: 0-2
yes
Advancement-Rotation Flap Text: The defect edges were debeveled with a #15 scalpel blade.  Given the location of the defect, shape of the defect and the proximity to free margins an advancement-rotation flap was deemed most appropriate.  Using a sterile surgical marker, an appropriate flap was drawn incorporating the defect and placing the expected incisions within the relaxed skin tension lines where possible. The area thus outlined was incised deep to adipose tissue with a #15 scalpel blade.  The skin margins were undermined to an appropriate distance in all directions utilizing iris scissors.

## 2024-02-18 NOTE — ED STATDOCS - OTHER FINDINGS
NSR rate of 64. No AV blocks. narrow QRS and no Bundle Branch Blocks. No STEs, TWIs, Wellen's Brugada, no Delta Waves. QTc of

## 2024-02-18 NOTE — ED STATDOCS - NSFOLLOWUPCLINICS_GEN_ALL_ED_FT
[facility]  Family Medicine  [address] [place] [address]  Phone: [phone]  Fax:

## 2024-02-18 NOTE — ED STATDOCS - CLINICAL SUMMARY MEDICAL DECISION MAKING FREE TEXT BOX
67 y/o female w/ cough and sore throat. Normal vitals, not hypoxic, no signs of bacterial infection. Plan to send cough meds and d/c w/ pcp follow up. Likely viral syndrome. 67 y/o female w/ cough and sore throat. Normal vitals, not hypoxic, no signs of bacterial infection. Plan to send cough meds and d/c w/ pcp follow up. Likely viral syndrome.        Will provide cough suppressant.  Kelly Link PA-C

## 2024-04-23 ENCOUNTER — APPOINTMENT (OUTPATIENT)
Dept: OBGYN | Facility: CLINIC | Age: 69
End: 2024-04-23
Payer: MEDICARE

## 2024-04-23 VITALS
WEIGHT: 210 LBS | SYSTOLIC BLOOD PRESSURE: 126 MMHG | BODY MASS INDEX: 39.65 KG/M2 | HEIGHT: 61 IN | DIASTOLIC BLOOD PRESSURE: 82 MMHG

## 2024-04-23 DIAGNOSIS — E13.69 OTHER SPECIFIED DIABETES MELLITUS WITH OTHER SPECIFIED COMPLICATION: ICD-10-CM

## 2024-04-23 DIAGNOSIS — Z01.419 ENCOUNTER FOR GYNECOLOGICAL EXAMINATION (GENERAL) (ROUTINE) W/OUT ABNORMAL FINDINGS: ICD-10-CM

## 2024-04-23 DIAGNOSIS — Z92.89 PERSONAL HISTORY OF OTHER MEDICAL TREATMENT: ICD-10-CM

## 2024-04-23 DIAGNOSIS — Z78.0 ASYMPTOMATIC MENOPAUSAL STATE: ICD-10-CM

## 2024-04-23 PROCEDURE — 99387 INIT PM E/M NEW PAT 65+ YRS: CPT

## 2024-04-23 PROCEDURE — 99459 PELVIC EXAMINATION: CPT

## 2024-04-23 RX ORDER — LISINOPRIL 40 MG/1
40 TABLET ORAL
Refills: 0 | Status: DISCONTINUED | COMMUNITY

## 2024-04-23 NOTE — COUNSELING
[Nutrition/ Exercise/ Weight Management] : nutrition, exercise, weight management [Body Image] : body image [FreeTextEntry2] : POSTMENOPAUSAL BLEEDING

## 2024-04-23 NOTE — PLAN
[FreeTextEntry1] : PT WITH PMB. DISCUSSED WITH PATIENT THRU INTERPRETATION AND CHAPERONE. WILL RTO FOR SONOGRAM AND POSSIBLE BX.

## 2024-04-23 NOTE — PHYSICAL EXAM
[Chaperone Present] : A chaperone was present in the examining room during all aspects of the physical examination [[id number]] : A chaperone was present during the pelvic exam. [FreeTextEntry2] : MARSHALL [Appropriately responsive] : appropriately responsive [Alert] : alert [No Acute Distress] : no acute distress [No Lymphadenopathy] : no lymphadenopathy [Regular Rate Rhythm] : regular rate rhythm [No Murmurs] : no murmurs [Clear to Auscultation B/L] : clear to auscultation bilaterally [Soft] : soft [Non-tender] : non-tender [Non-distended] : non-distended [No HSM] : No HSM [No Lesions] : no lesions [No Mass] : no mass [Oriented x3] : oriented x3 [Examination Of The Breasts] : a normal appearance [No Masses] : no breast masses were palpable [Vulvar Atrophy] : vulvar atrophy [Labia Majora] : normal [Labia Minora] : normal [Atrophy] : atrophy [No Bleeding] : There was no active vaginal bleeding [Normal] : normal [Uterine Adnexae] : normal

## 2024-04-23 NOTE — HISTORY OF PRESENT ILLNESS
[FreeTextEntry1] : 70 YO  WITH POSTMENOPAUSAL BLEEDING FOR EVALUATION. PT HAS BEEN STAINING FOR SOME TIME NOW. NOTES UTERINE PAIN

## 2024-04-23 NOTE — REVIEW OF SYSTEMS
[Patient Intake Form Reviewed] : Patient intake form was reviewed [Abn Vaginal bleeding] : abnormal vaginal bleeding [Pelvic pain] : no pelvic pain [Negative] : Heme/Lymph

## 2024-04-29 LAB — CYTOLOGY CVX/VAG DOC THIN PREP: NORMAL

## 2024-05-14 ENCOUNTER — APPOINTMENT (OUTPATIENT)
Dept: OBGYN | Facility: CLINIC | Age: 69
End: 2024-05-14
Payer: MEDICARE

## 2024-05-14 VITALS
SYSTOLIC BLOOD PRESSURE: 122 MMHG | BODY MASS INDEX: 39.65 KG/M2 | WEIGHT: 210 LBS | DIASTOLIC BLOOD PRESSURE: 60 MMHG | HEIGHT: 61 IN

## 2024-05-14 DIAGNOSIS — Z87.42 PERSONAL HISTORY OF OTHER DISEASES OF THE FEMALE GENITAL TRACT: ICD-10-CM

## 2024-05-14 DIAGNOSIS — R30.0 DYSURIA: ICD-10-CM

## 2024-05-14 PROCEDURE — 99459 PELVIC EXAMINATION: CPT

## 2024-05-14 PROCEDURE — 99214 OFFICE O/P EST MOD 30 MIN: CPT | Mod: 25

## 2024-05-14 PROCEDURE — 76830 TRANSVAGINAL US NON-OB: CPT

## 2024-05-14 PROCEDURE — 93976 VASCULAR STUDY: CPT

## 2024-05-14 RX ORDER — CIPROFLOXACIN HYDROCHLORIDE 250 MG/1
250 TABLET, FILM COATED ORAL
Qty: 10 | Refills: 3 | Status: ACTIVE | COMMUNITY
Start: 2024-05-14 | End: 1900-01-01

## 2024-05-14 NOTE — PROCEDURE
[Abnormal Uterine Bleeding] : abnormal uterine bleeding [Transvaginal Ultrasound] : transvaginal ultrasound [Color Doppler Imaging] : color doppler imaging [Retroverted] : retroverted [No Fibroid(s)] : no fibroid(s) [L: ___ cm] : L: [unfilled] cm [W: ___cm] : W: [unfilled] cm [H: ___ cm] : H: [unfilled] cm [Not Visualized] : not visualized [FreeTextEntry3] : THICKENED ENDOMETRIUM MEASURING 16.09 MM. NO FREE FLUID. COLOR FLOW NEGATIVE.  [FreeTextEntry4] : THICKENED ENDOMETRIUM IN A POSTMENOPAUSAL PATIENT. WILL CORRELATE WITH TISSUE SAMPLING

## 2024-05-14 NOTE — REVIEW OF SYSTEMS
[Patient Intake Form Reviewed] : Patient intake form was reviewed [Dysuria] : dysuria [Abn Vaginal bleeding] : abnormal vaginal bleeding [Pelvic pain] : no pelvic pain [Negative] : Heme/Lymph

## 2024-05-14 NOTE — PHYSICAL EXAM
[Chaperone Present] : A chaperone was present in the examining room during all aspects of the physical examination [[id number]] : A chaperone was present during the pelvic exam. [FreeTextEntry2] : CEE [Appropriately responsive] : appropriately responsive [Alert] : alert [No Acute Distress] : no acute distress [No Lymphadenopathy] : no lymphadenopathy [Regular Rate Rhythm] : regular rate rhythm [No Murmurs] : no murmurs [Clear to Auscultation B/L] : clear to auscultation bilaterally [Soft] : soft [Non-tender] : non-tender [Non-distended] : non-distended [No HSM] : No HSM [No Lesions] : no lesions [No Mass] : no mass [Oriented x3] : oriented x3 [Vulvar Atrophy] : vulvar atrophy [Labia Majora] : normal [Labia Minora] : normal [Atrophy] : atrophy [No Bleeding] : There was no active vaginal bleeding [Normal] : normal [Uterine Adnexae] : normal

## 2024-05-14 NOTE — HISTORY OF PRESENT ILLNESS
[FreeTextEntry1] : PT PRESENTS FOR FOLLOW UP VISIT AND SONOGRAM. STILL NOTES SOME VAGINAL BLEEDING. NOTES SOME BURNING WITH URINATION BUT HAS DIFFICULTY GIVING US A SAMPLE

## 2024-05-14 NOTE — PLAN
[FreeTextEntry1] : POSTMENOPAUSAL BLEEDING. EXPLAINED THE NEED FOR SAMPLING. WILL ARRANGE HYSTEROSCOPY D&C IN THE OR. WILL REQUIRE MEDICAL CLEARANCE. INFORMATION GIVEN. FOLLOW UP SCHEDULED.

## 2024-05-22 ENCOUNTER — OUTPATIENT (OUTPATIENT)
Dept: OUTPATIENT SERVICES | Facility: HOSPITAL | Age: 69
LOS: 1 days | End: 2024-05-22
Payer: MEDICARE

## 2024-05-22 VITALS
OXYGEN SATURATION: 97 % | HEIGHT: 60 IN | DIASTOLIC BLOOD PRESSURE: 48 MMHG | RESPIRATION RATE: 16 BRPM | TEMPERATURE: 98 F | SYSTOLIC BLOOD PRESSURE: 143 MMHG | WEIGHT: 213.19 LBS | HEART RATE: 77 BPM

## 2024-05-22 DIAGNOSIS — N95.0 POSTMENOPAUSAL BLEEDING: ICD-10-CM

## 2024-05-22 DIAGNOSIS — Z01.818 ENCOUNTER FOR OTHER PREPROCEDURAL EXAMINATION: ICD-10-CM

## 2024-05-22 DIAGNOSIS — Z98.49 CATARACT EXTRACTION STATUS, UNSPECIFIED EYE: Chronic | ICD-10-CM

## 2024-05-22 DIAGNOSIS — Z98.890 OTHER SPECIFIED POSTPROCEDURAL STATES: Chronic | ICD-10-CM

## 2024-05-22 LAB
A1C WITH ESTIMATED AVERAGE GLUCOSE RESULT: 7.3 % — HIGH (ref 4–5.6)
ANION GAP SERPL CALC-SCNC: 5 MMOL/L — SIGNIFICANT CHANGE UP (ref 5–17)
APPEARANCE UR: CLEAR — SIGNIFICANT CHANGE UP
BASOPHILS # BLD AUTO: 0.02 K/UL — SIGNIFICANT CHANGE UP (ref 0–0.2)
BASOPHILS NFR BLD AUTO: 0.4 % — SIGNIFICANT CHANGE UP (ref 0–2)
BILIRUB UR-MCNC: NEGATIVE — SIGNIFICANT CHANGE UP
BLD GP AB SCN SERPL QL: SIGNIFICANT CHANGE UP
BUN SERPL-MCNC: 22 MG/DL — SIGNIFICANT CHANGE UP (ref 7–23)
CALCIUM SERPL-MCNC: 9.3 MG/DL — SIGNIFICANT CHANGE UP (ref 8.5–10.1)
CHLORIDE SERPL-SCNC: 104 MMOL/L — SIGNIFICANT CHANGE UP (ref 96–108)
CO2 SERPL-SCNC: 26 MMOL/L — SIGNIFICANT CHANGE UP (ref 22–31)
COLOR SPEC: YELLOW — SIGNIFICANT CHANGE UP
CREAT SERPL-MCNC: 0.91 MG/DL — SIGNIFICANT CHANGE UP (ref 0.5–1.3)
DIFF PNL FLD: NEGATIVE — SIGNIFICANT CHANGE UP
EGFR: 69 ML/MIN/1.73M2 — SIGNIFICANT CHANGE UP
EOSINOPHIL # BLD AUTO: 0.13 K/UL — SIGNIFICANT CHANGE UP (ref 0–0.5)
EOSINOPHIL NFR BLD AUTO: 2.4 % — SIGNIFICANT CHANGE UP (ref 0–6)
ESTIMATED AVERAGE GLUCOSE: 163 MG/DL — HIGH (ref 68–114)
GLUCOSE SERPL-MCNC: 234 MG/DL — HIGH (ref 70–99)
GLUCOSE UR QL: >=1000 MG/DL
HCT VFR BLD CALC: 38.6 % — SIGNIFICANT CHANGE UP (ref 34.5–45)
HGB BLD-MCNC: 12.1 G/DL — SIGNIFICANT CHANGE UP (ref 11.5–15.5)
IMM GRANULOCYTES NFR BLD AUTO: 0.4 % — SIGNIFICANT CHANGE UP (ref 0–0.9)
KETONES UR-MCNC: NEGATIVE MG/DL — SIGNIFICANT CHANGE UP
LEUKOCYTE ESTERASE UR-ACNC: NEGATIVE — SIGNIFICANT CHANGE UP
LYMPHOCYTES # BLD AUTO: 1.94 K/UL — SIGNIFICANT CHANGE UP (ref 1–3.3)
LYMPHOCYTES # BLD AUTO: 35.5 % — SIGNIFICANT CHANGE UP (ref 13–44)
MCHC RBC-ENTMCNC: 26.1 PG — LOW (ref 27–34)
MCHC RBC-ENTMCNC: 31.3 GM/DL — LOW (ref 32–36)
MCV RBC AUTO: 83.2 FL — SIGNIFICANT CHANGE UP (ref 80–100)
MONOCYTES # BLD AUTO: 0.44 K/UL — SIGNIFICANT CHANGE UP (ref 0–0.9)
MONOCYTES NFR BLD AUTO: 8.1 % — SIGNIFICANT CHANGE UP (ref 2–14)
NEUTROPHILS # BLD AUTO: 2.91 K/UL — SIGNIFICANT CHANGE UP (ref 1.8–7.4)
NEUTROPHILS NFR BLD AUTO: 53.2 % — SIGNIFICANT CHANGE UP (ref 43–77)
NITRITE UR-MCNC: NEGATIVE — SIGNIFICANT CHANGE UP
PH UR: 5.5 — SIGNIFICANT CHANGE UP (ref 5–8)
PLATELET # BLD AUTO: 223 K/UL — SIGNIFICANT CHANGE UP (ref 150–400)
POTASSIUM SERPL-MCNC: 4.3 MMOL/L — SIGNIFICANT CHANGE UP (ref 3.5–5.3)
POTASSIUM SERPL-SCNC: 4.3 MMOL/L — SIGNIFICANT CHANGE UP (ref 3.5–5.3)
PROT UR-MCNC: NEGATIVE MG/DL — SIGNIFICANT CHANGE UP
RBC # BLD: 4.64 M/UL — SIGNIFICANT CHANGE UP (ref 3.8–5.2)
RBC # FLD: 14.3 % — SIGNIFICANT CHANGE UP (ref 10.3–14.5)
SODIUM SERPL-SCNC: 135 MMOL/L — SIGNIFICANT CHANGE UP (ref 135–145)
SP GR SPEC: 1.03 — SIGNIFICANT CHANGE UP (ref 1–1.03)
UROBILINOGEN FLD QL: 0.2 MG/DL — SIGNIFICANT CHANGE UP (ref 0.2–1)
WBC # BLD: 5.46 K/UL — SIGNIFICANT CHANGE UP (ref 3.8–10.5)
WBC # FLD AUTO: 5.46 K/UL — SIGNIFICANT CHANGE UP (ref 3.8–10.5)

## 2024-05-22 PROCEDURE — 93010 ELECTROCARDIOGRAM REPORT: CPT

## 2024-05-22 PROCEDURE — 36415 COLL VENOUS BLD VENIPUNCTURE: CPT

## 2024-05-22 PROCEDURE — 86850 RBC ANTIBODY SCREEN: CPT

## 2024-05-22 PROCEDURE — 85025 COMPLETE CBC W/AUTO DIFF WBC: CPT

## 2024-05-22 PROCEDURE — 83036 HEMOGLOBIN GLYCOSYLATED A1C: CPT

## 2024-05-22 PROCEDURE — 99214 OFFICE O/P EST MOD 30 MIN: CPT | Mod: 25

## 2024-05-22 PROCEDURE — 86901 BLOOD TYPING SEROLOGIC RH(D): CPT

## 2024-05-22 PROCEDURE — 80048 BASIC METABOLIC PNL TOTAL CA: CPT

## 2024-05-22 PROCEDURE — 86900 BLOOD TYPING SEROLOGIC ABO: CPT

## 2024-05-22 PROCEDURE — 93005 ELECTROCARDIOGRAM TRACING: CPT

## 2024-05-22 PROCEDURE — 81003 URINALYSIS AUTO W/O SCOPE: CPT

## 2024-05-22 NOTE — H&P PST ADULT - ASSESSMENT
69 year old female with post menopausal bleeding; denies pelvic pain; she presents to PST for planned D&C hysteroscopy       Plan:  1. PST instructions given ; NPO status/  instructions to be given by ASU   2. Pt instructed to take following meds on day of surgery: metoprolol lisinopril omeprazole   3. Pt instructed to take routine evening medications unless indicated   4. Stop NSAIDS ( Aspirin Alev Motrin Mobic Diclofenac), herbal supplements , MVI , Vitamin fish oil 7 days prior to surgery  unless   directed by surgeon or cardiologist;   5. Medical Optimization  with Dr Baptiste   6. Pt denies covid symptoms shortness of breath fever cough   7. Labs EKG  as per surgeon request

## 2024-05-22 NOTE — H&P PST ADULT - HISTORY OF PRESENT ILLNESS
69 year old female with post menopausal bleeding; denies pelvic pain; she presents to PST for planned D&C hysteroscopy

## 2024-05-22 NOTE — H&P PST ADULT - NSICDXPASTMEDICALHX_GEN_ALL_CORE_FT
PAST MEDICAL HISTORY:  Constipation     DM (diabetes mellitus)     GERD (gastroesophageal reflux disease)     H/O pyelonephritis     HLD (hyperlipidemia)     HTN (hypertension)     Lumbar herniated disc     Obesity     Other depression

## 2024-05-22 NOTE — H&P PST ADULT - NSANTHOSAYNRD_GEN_A_CORE
No. JOSE DE JESUS screening performed.  STOP BANG Legend: 0-2 = LOW Risk; 3-4 = INTERMEDIATE Risk; 5-8 = HIGH Risk

## 2024-05-22 NOTE — ED ADULT NURSE NOTE - CHIEF COMPLAINT QUOTE
Pt complains of worsening chronic bilateral lower leg pain with difficulty walking.
[FreeTextEntry1] : 89 F recently , with CAD s/p multiple PCI in [facility], CABG x 3 2011, previously a patient of Dr. Pulido. Most of the year is spend in [facility]. Lost to follow up for 3 years. Has an [employer] single-chamber pacemaker (DOI: 3/16/2021, in [facility]). She has normal pacemaker function. She does not have any concerning arrhythmias on her device.  Last cath was in 5/2019 that revealed patent MURRELL to LAD, severe RCA disease and borderline normal LV function. No intervention performed at that time. At the present time patient is completely asymptomatic and denies CP, SOB, orthopnea or PND. Denies palpitations, dizziness or ankle swelling. Daughter states that she has been C/O dizziness. No falls. Under eval for dementia/Alzheimers disease with Neurology. C/O tiredness whan she walks around the house.  Last Echo was done in 5/2019 that revealed normal LV function (55-60%). Trace MR and mild MO.  Reports claudication symptoms after walking 30 minutes, avid walker, symptoms controlled. Peripheral angiogram 1/2017 which did not show significant disease.  Underwent ex lap cholecystectomy and small bowel resection? Hyperlipidemia treated with meds Prediabetes, treated with diet alone Never smoked Occasional alcohol Denies the use of illicit drugs Received the COVID 19 vaccine

## 2024-05-23 DIAGNOSIS — Z01.818 ENCOUNTER FOR OTHER PREPROCEDURAL EXAMINATION: ICD-10-CM

## 2024-05-23 DIAGNOSIS — N95.0 POSTMENOPAUSAL BLEEDING: ICD-10-CM

## 2024-05-29 ENCOUNTER — NON-APPOINTMENT (OUTPATIENT)
Age: 69
End: 2024-05-29

## 2024-05-29 ENCOUNTER — APPOINTMENT (OUTPATIENT)
Dept: OBGYN | Facility: HOSPITAL | Age: 69
End: 2024-05-29

## 2024-05-31 PROBLEM — Z87.448 PERSONAL HISTORY OF OTHER DISEASES OF URINARY SYSTEM: Chronic | Status: ACTIVE | Noted: 2024-05-22

## 2024-05-31 PROBLEM — E66.9 OBESITY, UNSPECIFIED: Chronic | Status: ACTIVE | Noted: 2024-05-22

## 2024-05-31 PROBLEM — M51.26 OTHER INTERVERTEBRAL DISC DISPLACEMENT, LUMBAR REGION: Chronic | Status: ACTIVE | Noted: 2024-05-22

## 2024-05-31 PROBLEM — K21.9 GASTRO-ESOPHAGEAL REFLUX DISEASE WITHOUT ESOPHAGITIS: Chronic | Status: ACTIVE | Noted: 2024-05-22

## 2024-05-31 PROBLEM — K59.00 CONSTIPATION, UNSPECIFIED: Chronic | Status: ACTIVE | Noted: 2024-05-22

## 2024-05-31 PROBLEM — F32.89 OTHER SPECIFIED DEPRESSIVE EPISODES: Chronic | Status: ACTIVE | Noted: 2024-05-22

## 2024-06-03 ENCOUNTER — APPOINTMENT (OUTPATIENT)
Dept: HOME HEALTH SERVICES | Facility: HOME HEALTH | Age: 69
End: 2024-06-03
Payer: MEDICARE

## 2024-06-03 ENCOUNTER — APPOINTMENT (OUTPATIENT)
Dept: CARE COORDINATION | Facility: HOME HEALTH | Age: 69
End: 2024-06-03

## 2024-06-03 ENCOUNTER — APPOINTMENT (OUTPATIENT)
Dept: HOME HEALTH SERVICES | Facility: HOME HEALTH | Age: 69
End: 2024-06-03

## 2024-06-03 VITALS
OXYGEN SATURATION: 96 % | HEART RATE: 67 BPM | WEIGHT: 210 LBS | DIASTOLIC BLOOD PRESSURE: 70 MMHG | BODY MASS INDEX: 39.68 KG/M2 | SYSTOLIC BLOOD PRESSURE: 154 MMHG | RESPIRATION RATE: 16 BRPM

## 2024-06-03 DIAGNOSIS — E78.00 PURE HYPERCHOLESTEROLEMIA, UNSPECIFIED: ICD-10-CM

## 2024-06-03 DIAGNOSIS — N95.0 POSTMENOPAUSAL BLEEDING: ICD-10-CM

## 2024-06-03 DIAGNOSIS — E11.9 TYPE 2 DIABETES MELLITUS W/OUT COMPLICATIONS: ICD-10-CM

## 2024-06-03 DIAGNOSIS — M54.50 LOW BACK PAIN, UNSPECIFIED: ICD-10-CM

## 2024-06-03 DIAGNOSIS — J30.2 OTHER SEASONAL ALLERGIC RHINITIS: ICD-10-CM

## 2024-06-03 DIAGNOSIS — I10 ESSENTIAL (PRIMARY) HYPERTENSION: ICD-10-CM

## 2024-06-03 PROCEDURE — 99344 HOME/RES VST NEW MOD MDM 60: CPT

## 2024-06-03 RX ORDER — METFORMIN HYDROCHLORIDE 1000 MG/1
1000 TABLET, COATED ORAL
Refills: 0 | Status: ACTIVE | COMMUNITY

## 2024-06-03 RX ORDER — FAMOTIDINE 40 MG/1
40 TABLET, FILM COATED ORAL
Refills: 0 | Status: ACTIVE | COMMUNITY
Start: 2024-06-03

## 2024-06-03 RX ORDER — LISINOPRIL 40 MG/1
40 TABLET ORAL DAILY
Qty: 30 | Refills: 0 | Status: ACTIVE | COMMUNITY
Start: 2024-06-03

## 2024-06-03 RX ORDER — TIZANIDINE 2 MG/1
2 TABLET ORAL EVERY 6 HOURS
Qty: 40 | Refills: 0 | Status: COMPLETED | COMMUNITY
Start: 2019-04-08 | End: 2024-06-03

## 2024-06-03 RX ORDER — METOPROLOL SUCCINATE 50 MG/1
50 TABLET, EXTENDED RELEASE ORAL DAILY
Refills: 0 | Status: ACTIVE | COMMUNITY
Start: 2024-06-03

## 2024-06-03 RX ORDER — ICOSAPENT ETHYL 1000 MG/1
1 CAPSULE ORAL DAILY
Refills: 0 | Status: ACTIVE | COMMUNITY
Start: 2024-06-03

## 2024-06-03 RX ORDER — OMEPRAZOLE 40 MG/1
40 CAPSULE, DELAYED RELEASE ORAL DAILY
Refills: 0 | Status: ACTIVE | COMMUNITY

## 2024-06-03 RX ORDER — MELOXICAM 15 MG/1
15 TABLET ORAL DAILY
Qty: 30 | Refills: 0 | Status: ACTIVE | COMMUNITY
Start: 2022-04-06

## 2024-06-03 RX ORDER — TIZANIDINE 2 MG/1
2 TABLET ORAL EVERY 8 HOURS
Qty: 60 | Refills: 0 | Status: COMPLETED | COMMUNITY
Start: 2019-04-09 | End: 2024-06-03

## 2024-06-03 RX ORDER — ATORVASTATIN CALCIUM 40 MG/1
40 TABLET, FILM COATED ORAL
Refills: 0 | Status: ACTIVE | COMMUNITY

## 2024-06-03 RX ORDER — GLIPIZIDE 5 MG/1
5 TABLET ORAL DAILY
Refills: 0 | Status: ACTIVE | COMMUNITY

## 2024-06-03 RX ORDER — DULOXETINE HYDROCHLORIDE 30 MG/1
30 CAPSULE, DELAYED RELEASE PELLETS ORAL DAILY
Refills: 0 | Status: ACTIVE | COMMUNITY
Start: 2024-06-03

## 2024-06-03 RX ORDER — SITAGLIPTIN 100 MG/1
100 TABLET, FILM COATED ORAL DAILY
Refills: 0 | Status: ACTIVE | COMMUNITY

## 2024-06-03 RX ORDER — OXYCODONE 10 MG/1
10 TABLET ORAL EVERY 6 HOURS
Qty: 28 | Refills: 0 | Status: COMPLETED | COMMUNITY
Start: 2019-04-08 | End: 2024-06-03

## 2024-06-03 RX ORDER — EMPAGLIFLOZIN 25 MG/1
25 TABLET, FILM COATED ORAL
Refills: 0 | Status: ACTIVE | COMMUNITY
Start: 2024-06-03

## 2024-06-03 RX ORDER — MULTIVIT-MIN/IRON/FOLIC ACID/K 18-600-40
50 MCG CAPSULE ORAL DAILY
Refills: 0 | Status: ACTIVE | COMMUNITY
Start: 2024-06-03

## 2024-06-03 NOTE — REASON FOR VISIT
[Initial Evaluation] : an initial evaluation [Pre-Visit Preparation] : pre-visit preparation was done [FreeTextEntry1] : Via  [employer] #[id number] [employer] [FreeTextEntry2] : [facility] Clinical Viewer

## 2024-06-03 NOTE — PHYSICAL EXAM
[No Acute Distress] : no acute distress [Normal Voice/Communication] : normal voice communication [Normal Sclera/Conjunctiva] : normal sclera/conjunctiva [Normal Outer Ear/Nose] : the ears and nose were normal in appearance [Normal Oropharynx] : the oropharynx was normal [No JVD] : no jugular venous distention [Supple] : the neck was supple [No Respiratory Distress] : no respiratory distress [Clear to Auscultation] : lungs were clear to auscultation bilaterally [No Accessory Muscle Use] : no accessory muscle use [Normal Rate] : heart rate was normal  [Regular Rhythm] : with a regular rhythm [Normal S1, S2] : normal S1 and S2 [Pedal Pulses Present] : the pedal pulses are present [No Edema] : there was no peripheral edema [Normal Appearance] : normal in appearance [No Nipple Discharge] : no nipple discharge [Normal Bowel Sounds] : normal bowel sounds [Non Tender] : non-tender [Soft] : abdomen soft [Not Distended] : not distended [No CVA Tenderness] : no ~M costovertebral angle tenderness [No Spinal Tenderness] : no spinal tenderness [No Clubbing, Cyanosis] : no clubbing  or cyanosis of the fingernails [Normal Strength/Tone] : muscle strength and tone were normal [No Rash] : no rash [Cranial Nerves Intact] : cranial nerves 2-12 were intact [No Motor Deficits] : the motor exam was normal [No Gross Sensory Deficits] : no gross sensory deficits [Oriented x3] : oriented to person, place, and time [Normal Mood] : the mood was normal [Foot Ulcers] : no foot ulcers [de-identified] : no thrush [de-identified] : amb with rolllator [de-identified] : sacrum/heels intact [de-identified] : 2/12 grossly intact [de-identified] : slighlty flat affect

## 2024-06-03 NOTE — HISTORY OF PRESENT ILLNESS
[Patient is stable - had PCP appoinment] : patient is stable - had PCP appointment [Patient] : patient [LastPVisitDate] : 05/2024 [FreeTextEntry4] : Dr. Kyle Summers GYN [LastSpecialistVisitDate] : 05/2024 [FreeTextEntry1] : Recent Labs 5/22/24 at PCP: HbA1c 7.3 H/H 12.1/38.6 BUN/Cr 22/0/91  Has PCP f/u June 2024 and will get rx for mammogram at that time  Hospitalizations: March 2023 HTN: s/p mechanical fall sustained nasal fracture with laceration [FreeTextEntry2] : PMH: DM 2, HTN, HLD, morbid obesity, chronic lower back pain post L4/5 decompression Jan 2019  70 y/o pleasant Albanian speaking female NAD. ([employer] ) Patient would like to continue to see her current PCP  Sitting on bed for exam Reports good appetite, no swallowing issues chronic intermittent back pain sleeps on average 6hr per night for years 2 falls this past year ambulate with rollator Mood and behavior stable  Overall she feels ok in NAD. She reports chronic intermittent back pain & numbness to her fingers/feet & intermittent pain in her calves when ambulating. She has a h/o L4/5 decompression sxy 2019 with lumbar stenosis & neurogenic claudication. She is on mobic and cymbalta.  She is with recent post menopausal vaginal bleeding/dysuria & will take last dose cipro today. She states is has pretty much resolved and only has a tiny amount of blood on the tissue. Was scheduled to have hysteroscopy with D&C at [facility] on 5/29 but she cancelled it 2/2 headache with N/V. Reports having similar episodes in the past. She will see her PCP later this month and update on the above.

## 2024-06-03 NOTE — REVIEW OF SYSTEMS
[Leg Claudication] : leg claudication [Negative] : Psychiatric [FreeTextEntry5] : h/o neurogenic claudication [FreeTextEntry9] : debility [de-identified] : numbness/tingling fingers/feet

## 2024-06-03 NOTE — ASSESSMENT
[FreeTextEntry1] : A/P: # Hypertension: - BP mildly elevated on exam, as just took medication - con't metoprolol, lisinopril - routine PCP follow up  # Hyperlipidemia: - heart healthy diet - con't lipitor - routine PCP follow up  # Seasonal Allergies: - allergy avoidance - claritin daily  # Lumbago: - s/p L 4/5 decompression surgery 2019 Dr. Shoemaker - con't mobfederico - call for worsening sx, f/u prn  # Post Menopausal Bleeding: - our office with contact Dr. Summers's office to reschedule hysteroscopy with D&C - report any increased bleeding

## 2024-06-03 NOTE — HEALTH RISK ASSESSMENT
[HRA Reviewed] : Health risk assessment reviewed [Independent] : managing finances [Some assistance needed] : using transportation [Two or more falls in past year] : Patient reported two or more falls in the past year [FreeTextEntry2] : needs transportation assistance [FreeTextEntry6] : needs transportation assistance

## 2024-06-11 ENCOUNTER — APPOINTMENT (OUTPATIENT)
Dept: OBGYN | Facility: CLINIC | Age: 69
End: 2024-06-11

## 2024-06-26 ENCOUNTER — TRANSCRIPTION ENCOUNTER (OUTPATIENT)
Age: 69
End: 2024-06-26

## 2024-06-26 ENCOUNTER — OUTPATIENT (OUTPATIENT)
Dept: INPATIENT UNIT | Facility: HOSPITAL | Age: 69
LOS: 1 days | Discharge: HOME | End: 2024-06-26
Payer: MEDICARE

## 2024-06-26 ENCOUNTER — APPOINTMENT (OUTPATIENT)
Dept: OBGYN | Facility: HOSPITAL | Age: 69
End: 2024-06-26

## 2024-06-26 ENCOUNTER — RESULT REVIEW (OUTPATIENT)
Age: 69
End: 2024-06-26

## 2024-06-26 VITALS
DIASTOLIC BLOOD PRESSURE: 52 MMHG | HEIGHT: 60 IN | SYSTOLIC BLOOD PRESSURE: 147 MMHG | RESPIRATION RATE: 18 BRPM | TEMPERATURE: 99 F | WEIGHT: 213.19 LBS | HEART RATE: 68 BPM

## 2024-06-26 VITALS
RESPIRATION RATE: 16 BRPM | TEMPERATURE: 98 F | HEART RATE: 69 BPM | SYSTOLIC BLOOD PRESSURE: 148 MMHG | OXYGEN SATURATION: 97 % | DIASTOLIC BLOOD PRESSURE: 70 MMHG

## 2024-06-26 DIAGNOSIS — Z98.890 OTHER SPECIFIED POSTPROCEDURAL STATES: Chronic | ICD-10-CM

## 2024-06-26 DIAGNOSIS — Z98.49 CATARACT EXTRACTION STATUS, UNSPECIFIED EYE: Chronic | ICD-10-CM

## 2024-06-26 DIAGNOSIS — N95.0 POSTMENOPAUSAL BLEEDING: ICD-10-CM

## 2024-06-26 LAB — BLD GP AB SCN SERPL QL: SIGNIFICANT CHANGE UP

## 2024-06-26 PROCEDURE — 88305 TISSUE EXAM BY PATHOLOGIST: CPT | Mod: 26

## 2024-06-26 PROCEDURE — 86901 BLOOD TYPING SEROLOGIC RH(D): CPT

## 2024-06-26 PROCEDURE — 58558 HYSTEROSCOPY BIOPSY: CPT

## 2024-06-26 PROCEDURE — 86850 RBC ANTIBODY SCREEN: CPT

## 2024-06-26 PROCEDURE — 88305 TISSUE EXAM BY PATHOLOGIST: CPT

## 2024-06-26 PROCEDURE — 86900 BLOOD TYPING SEROLOGIC ABO: CPT

## 2024-06-26 PROCEDURE — 36415 COLL VENOUS BLD VENIPUNCTURE: CPT

## 2024-06-26 RX ORDER — OXYCODONE HYDROCHLORIDE 100 MG/5ML
5 SOLUTION ORAL ONCE
Refills: 0 | Status: DISCONTINUED | OUTPATIENT
Start: 2024-06-26 | End: 2024-06-26

## 2024-06-26 RX ORDER — ATORVASTATIN CALCIUM 80 MG/1
1 TABLET, FILM COATED ORAL
Refills: 0 | DISCHARGE

## 2024-06-26 RX ORDER — CHOLECALCIFEROL (VITAMIN D3) 125 MCG
1 CAPSULE ORAL
Refills: 0 | DISCHARGE

## 2024-06-26 RX ORDER — LORATADINE 10 MG/1
1 TABLET ORAL
Refills: 0 | DISCHARGE

## 2024-06-26 RX ORDER — DEXTROSE MONOHYDRATE AND SODIUM CHLORIDE 5; .3 G/100ML; G/100ML
1000 INJECTION, SOLUTION INTRAVENOUS
Refills: 0 | Status: DISCONTINUED | OUTPATIENT
Start: 2024-06-26 | End: 2024-06-26

## 2024-06-26 RX ORDER — ONDANSETRON HYDROCHLORIDE 2 MG/ML
4 INJECTION INTRAMUSCULAR; INTRAVENOUS ONCE
Refills: 0 | Status: DISCONTINUED | OUTPATIENT
Start: 2024-06-26 | End: 2024-06-26

## 2024-06-26 RX ORDER — OMEPRAZOLE 10 MG/1
1 CAPSULE, DELAYED RELEASE ORAL
Refills: 0 | DISCHARGE

## 2024-06-26 RX ORDER — FAMOTIDINE 10 MG/ML
1 INJECTION INTRAVENOUS
Refills: 0 | DISCHARGE

## 2024-06-26 RX ORDER — FENTANYL CITRATE 50 UG/ML
50 INJECTION, SOLUTION INTRAMUSCULAR; INTRAVENOUS
Refills: 0 | Status: DISCONTINUED | OUTPATIENT
Start: 2024-06-26 | End: 2024-06-26

## 2024-07-01 LAB — SURGICAL PATHOLOGY STUDY: SIGNIFICANT CHANGE UP

## 2024-07-02 DIAGNOSIS — F32.A DEPRESSION, UNSPECIFIED: ICD-10-CM

## 2024-07-02 DIAGNOSIS — R93.89 ABNORMAL FINDINGS ON DIAGNOSTIC IMAGING OF OTHER SPECIFIED BODY STRUCTURES: ICD-10-CM

## 2024-07-02 DIAGNOSIS — N84.0 POLYP OF CORPUS UTERI: ICD-10-CM

## 2024-07-02 DIAGNOSIS — E78.5 HYPERLIPIDEMIA, UNSPECIFIED: ICD-10-CM

## 2024-07-02 DIAGNOSIS — E66.9 OBESITY, UNSPECIFIED: ICD-10-CM

## 2024-07-02 DIAGNOSIS — E11.9 TYPE 2 DIABETES MELLITUS WITHOUT COMPLICATIONS: ICD-10-CM

## 2024-07-02 DIAGNOSIS — N95.0 POSTMENOPAUSAL BLEEDING: ICD-10-CM

## 2024-07-02 DIAGNOSIS — Z79.84 LONG TERM (CURRENT) USE OF ORAL HYPOGLYCEMIC DRUGS: ICD-10-CM

## 2024-07-09 ENCOUNTER — APPOINTMENT (OUTPATIENT)
Dept: OBGYN | Facility: CLINIC | Age: 69
End: 2024-07-09

## 2024-08-01 ENCOUNTER — EMERGENCY (EMERGENCY)
Facility: HOSPITAL | Age: 69
LOS: 0 days | Discharge: HOME | End: 2024-08-01
Attending: EMERGENCY MEDICINE
Payer: MEDICARE

## 2024-08-01 VITALS
HEIGHT: 60 IN | TEMPERATURE: 99 F | SYSTOLIC BLOOD PRESSURE: 156 MMHG | WEIGHT: 214.29 LBS | HEART RATE: 88 BPM | DIASTOLIC BLOOD PRESSURE: 56 MMHG | RESPIRATION RATE: 18 BRPM | OXYGEN SATURATION: 96 %

## 2024-08-01 VITALS
SYSTOLIC BLOOD PRESSURE: 152 MMHG | OXYGEN SATURATION: 99 % | RESPIRATION RATE: 18 BRPM | HEART RATE: 62 BPM | DIASTOLIC BLOOD PRESSURE: 91 MMHG | TEMPERATURE: 98 F

## 2024-08-01 DIAGNOSIS — L29.9 PRURITUS, UNSPECIFIED: ICD-10-CM

## 2024-08-01 DIAGNOSIS — E11.9 TYPE 2 DIABETES MELLITUS WITHOUT COMPLICATIONS: ICD-10-CM

## 2024-08-01 DIAGNOSIS — E78.5 HYPERLIPIDEMIA, UNSPECIFIED: ICD-10-CM

## 2024-08-01 DIAGNOSIS — R51.9 HEADACHE, UNSPECIFIED: ICD-10-CM

## 2024-08-01 DIAGNOSIS — Z98.49 CATARACT EXTRACTION STATUS, UNSPECIFIED EYE: Chronic | ICD-10-CM

## 2024-08-01 LAB
APPEARANCE UR: CLEAR — SIGNIFICANT CHANGE UP
BILIRUB UR-MCNC: NEGATIVE — SIGNIFICANT CHANGE UP
COLOR SPEC: YELLOW — SIGNIFICANT CHANGE UP
DIFF PNL FLD: NEGATIVE — SIGNIFICANT CHANGE UP
GLUCOSE UR QL: >=1000 MG/DL
KETONES UR-MCNC: NEGATIVE MG/DL — SIGNIFICANT CHANGE UP
LEUKOCYTE ESTERASE UR-ACNC: NEGATIVE — SIGNIFICANT CHANGE UP
NITRITE UR-MCNC: NEGATIVE — SIGNIFICANT CHANGE UP
PH UR: 5 — SIGNIFICANT CHANGE UP (ref 5–8)
PROT UR-MCNC: NEGATIVE MG/DL — SIGNIFICANT CHANGE UP
SP GR SPEC: >1.03 — HIGH (ref 1–1.03)
UROBILINOGEN FLD QL: 0.2 MG/DL — SIGNIFICANT CHANGE UP (ref 0.2–1)

## 2024-08-01 PROCEDURE — 81003 URINALYSIS AUTO W/O SCOPE: CPT

## 2024-08-01 PROCEDURE — 99283 EMERGENCY DEPT VISIT LOW MDM: CPT | Mod: FS

## 2024-08-01 PROCEDURE — 99283 EMERGENCY DEPT VISIT LOW MDM: CPT

## 2024-08-01 RX ADMIN — Medication 400 MILLIGRAM(S): at 18:20

## 2024-08-01 NOTE — ED STATDOCS - ATTENDING APP SHARED VISIT CONTRIBUTION OF CARE
Dr. De La O: I performed a face to face bedside interview with patient regarding history of present illness, review of symptoms and past medical history. I completed an independent physical exam.  I have discussed patient's plan of care with PA.   I agree with note as stated above, having amended the EMR as needed to reflect my findings.   This includes HISTORY OF PRESENT ILLNESS, HIV, PAST MEDICAL/SURGICAL/FAMILY/SOCIAL HISTORY, ALLERGIES AND HOME MEDICATIONS, REVIEW OF SYSTEMS, PHYSICAL EXAM, and any PROGRESS NOTES during the time I functioned as the attending physician for this patient.  DARLING De La O DO

## 2024-08-01 NOTE — ED STATDOCS - PHYSICAL EXAMINATION
Gen:  Well appearning in NAD  Head:  NC/AT. dandruff on the scalp  Resp: No distress   Ext: no deformities  Skin: warm and dry as visualized. no evidence of vesicular lesions or rash on scalp.

## 2024-08-01 NOTE — ED STATDOCS - OBJECTIVE STATEMENT
69 year old female with PMHx of DM, HLD, presents to ED c/o headache and itching to the back of the head since yesterday. patient denies family members having a similar condition. patient states they took Tylenol with no relief. patient states they have a burning sensation when they pee. Consent: The patient's consent was obtained including but not limited to risks of crusting, scabbing, blistering, scarring, darker or lighter pigmentary change, recurrence, incomplete removal and infection. Show Aperture Variable?: Yes Render Note In Bullet Format When Appropriate: No Application Tool (Optional): Cry-AC Post-Care Instructions: I reviewed with the patient in detail post-care instructions. Patient is to wear sunprotection, and avoid picking at any of the treated lesions. Pt may apply Vaseline to crusted or scabbing areas. Duration Of Freeze Thaw-Cycle (Seconds): 3 Detail Level: Detailed Number Of Freeze-Thaw Cycles: 1 freeze-thaw cycle

## 2024-08-01 NOTE — ED STATDOCS - PATIENT PORTAL LINK FT
You can access the FollowMyHealth Patient Portal offered by [facility] by registering at the following website: [email or web address]. By joining [employer]’s FollowMyHealth portal, you will also be able to view your health information using other applications (apps) compatible with our system.

## 2024-08-01 NOTE — ED ADULT TRIAGE NOTE - CHIEF COMPLAINT QUOTE
pt complaining of headache since last night with itching all over her head.  pt took tylenol last night with some relief.

## 2024-08-01 NOTE — ED STATDOCS - PROGRESS NOTE DETAILS
ID #[id number]. UA negative for UTI. Patient reassessed, endorses some improvement of headache, feels well for discharge, advised to continue Motrin and/or Tylenol at home and follow up with her PCP. Strict return precautions were given. All questions and concerns were addressed. - rBooklyn Moulton PA-C

## 2024-08-01 NOTE — ED STATDOCS - NSFOLLOWUPINSTRUCTIONS_ED_ALL_ED_FT
Follow up with your PCP. Take Tylenol 650-1000 mg every 6 hours as needed for pain. Do not exceed 4,000 mg in a 24 hour period. Take Ibuprofen 600-800 mg every 6 hours as needed for moderate pain -- take with food. Take benadryl 50 mg every 6 hours for itching. Return to the ED for new or worsening symptoms.    Juan un seguimiento con suggs PCP.  [place] Tylenol 650-1000 mg cada 6 horas según sea necesario para el dolor. No exceda los 4.000 mg en un período de 24 horas. [place] ibuprofeno de 600 a 800 mg cada 6 horas según sea necesario para el dolor moderado; tómelo con alimentos. [place] Benadryl 50 mg cada 6 horas para la picazón. Regrese al servicio de urgencias si los síntomas aparecen o empeoran.    Cefalea tensional en los adultos  Tension Headache, Adult  La cefalea tensional es scooter sensación de dolor o presión en la frente y los lados de la tatyana. El dolor puede ser sordo o puede sentirse fifi scooter tensión. Hay dos tipos de cefaleas tensionales:  Cefalea tensional episódica. Es cuando las cefaleas se producen menos de 15 días por mes.  Cefalea tensional crónica. Es cuando las cefaleas se producen más de 15 días por mes en un período de 3 meses.  Las cefaleas tensionales pueden durar de 30 minutos a varios días. [place]en el tipo más frecuente de dolor de tatyana. Generalmente, no se asocian con náuseas o vómitos y no empeoran con la actividad física.    ¿Cuáles son las causas?  Se desconoce la causa exacta de esta afección. Las cefaleas tensionales generalmente aparecen después de scooter situación de estrés, ansiedad o por depresión. Otros factores desencadenantes pueden incluir los siguientes:  Alcohol.  Exceso de cafeína o abstinencia de cafeína.  Infecciones respiratorias, fifi resfriados, gripes o sinusitis.  Problemas dentales o apretar los dientes.  Fatiga.  Mantener la tatyana y el arun en la misma posición sarwat un período prolongado, por ejemplo, al usar la computadora.  Fumar.  Artritis del arun.  ¿Cuáles son los signos o los síntomas?  Los síntomas de esta afección incluyen:  Sensación de presión o tensión alrededor de la tatyana.  Dolor sordo en la tatyana.  Dolor sobre la frente y los lados de la tatyana.  Dolor a la palpación en los músculos de la tatyana, del arun y de los hombros.  ¿Cómo se diagnostica?  Esta afección se puede diagnosticar en función de los síntomas, la historia clínica y los antecedentes médicos.    Si los síntomas son agudos o inusuales, es posible que le milla estudios de diagnóstico por imágenes, fifi scooter exploración por tomografía computarizada (TC) o scooter resonancia magnética (RM) de la tatyana. También le pueden revisar la vista.    ¿Cómo se trata?  Esta afección puede tratarse con cambios en el estilo de alexandra y medicamentos que ayudan a aliviar los síntomas.    Siga estas instrucciones en suggs casa:  Control del dolor    Use los medicamentos de venta thuan y los recetados solamente fifi se lo haya indicado el médico.  Cuando tenga scooter cefalea, acuéstese en scooter habitación oscura y silenciosa.  Si se lo indican, aplíquese hielo en la tatyana y en el arun. Para hacer esto:  Ponga el hielo en scooter bolsa plástica.  Coloque scooter toalla entre la piel y la bolsa.  Aplique el hielo sarwat 20 minutos, 2 o 3 veces por día.  Retire el hielo si la piel se pone de color juarez brillante. [place] es muy importante. Si no puede sentir dolor, calor o frío, tiene un mayor riesgo de que se dañe la niall.  Si se lo indican, aplique calor en la niall posterior del arun con la frecuencia que le haya indicado el médico. Use la aria de calor que el médico le recomiende, fifi scooter compresa de calor húmedo o scooter almohadilla térmica.  Coloque scooter toalla entre la piel y la aria de calor.  Aplique calor sarwat 20 a 30 minutos.  Retire la aria de calor si la piel se pone de color juarez brillante. [place] es especialmente importante si no puede sentir dolor, calor o frío. Corre un mayor riesgo de sufrir quemaduras.  Comida y bebida    Mantenga un horario para las comidas.  Si salvatore alcohol:  Limite la cantidad que salvatore a lo siguiente:  De 0 a 1 medida por día para las mujeres que no están embarazadas.  De 0 a 2 medidas por día para los hombres.  Sepa cuánta cantidad de alcohol hay en las bebidas que ella. En los [place], scooter medida equivale a scooter botella de cerveza de 12 oz (355 ml), un vaso de vino de 5 oz (148 ml) o un vaso de scooter bebida alcohólica de osman graduación de 1½ oz (44 ml).  Cinthya suficiente líquido fifi para mantener la orina de color amarillo pálido.  Disminuya el consumo de cafeína o deje de consumir cafeína.  Estilo de alexandra    Duerma entre 7 y 9 horas o la cantidad de horas que le haya recomendado el médico.  A la hora de acostarse, retire las computadoras, los teléfonos y las tabletas del dormitorio.  Busque maneras de manejar el estrés. [place] puede incluir:  Realizar actividad física.  Practicar ejercicios de respiración profunda.  Yoga.  Escuchar música.  Visualización mental positiva.  Trate de sentarse derecho y evite tensionar los músculos.  No consuma ningún producto que contenga nicotina o tabaco. Estos incluyen cigarrillos, tabaco para mascar y aparatos de vapeo, fifi los cigarrillos electrónicos. Si necesita ayuda para dejar de fumar, consulte al médico.  Instrucciones generales      Evite cualquier desencadenante del dolor de tatyana. Lleve un registro diario para averiguar qué puede desencadenar las cefaleas. Registre, por ejemplo, lo siguiente:  Lo que usted come y salvatore.  El tiempo que duerme.  Algún cambio en suggs dieta o en los medicamentos.  Cumpla con todas las visitas de seguimiento. [place] es importante.  Comuníquese con un médico si:  La cefalea no se damon.  La cefalea regresa.  Tiene sensibilidad a los sonidos, la derick o los olores debido a la cefalea.  Tiene náuseas o vómitos.  Le duele el estómago.  Solicite ayuda de inmediato si:  Tiene scooter cefalea muy intensa y repentina junto con alguno de los siguientes síntomas:  Rigidez en el arun.  Náuseas y vómitos.  Confusión.  Debilidad en scooter parte o un lado del cuerpo.  Visión doble o pérdida de la visión.  Falta de aire.  Erupción cutánea.  Somnolencia inusual.  Fiebre o escalofríos.  Dificultad para hablar.  Dolor en el rory o el oído.  Dificultad para caminar o mantener el equilibrio.  Mareo o desmayo.  Resumen  La cefalea tensional es scooter sensación de dolor o presión en la frente y los lados de la tatyana.  Las cefaleas tensionales pueden durar de 30 minutos a varios días. Constituyen el tipo más frecuente de dolor de tatyana.  Esta afección se puede diagnosticar en función de los síntomas, la historia clínica y los antecedentes médicos.  Esta afección puede tratarse con cambios en el estilo de alexandra y medicamentos que ayudan a aliviar los síntomas.  Esta información no tiene fifi fin reemplazar el consejo del médico. Asegúrese de hacerle al médico cualquier pregunta que tenga.

## 2024-08-02 ENCOUNTER — APPOINTMENT (OUTPATIENT)
Dept: HOME HEALTH SERVICES | Facility: HOME HEALTH | Age: 69
End: 2024-08-02

## 2024-08-03 ENCOUNTER — NON-APPOINTMENT (OUTPATIENT)
Age: 69
End: 2024-08-03

## 2024-08-03 ENCOUNTER — APPOINTMENT (OUTPATIENT)
Dept: HOME HEALTH SERVICES | Facility: HOME HEALTH | Age: 69
End: 2024-08-03

## 2024-08-31 ENCOUNTER — EMERGENCY (EMERGENCY)
Facility: HOSPITAL | Age: 69
LOS: 0 days | Discharge: HOME | End: 2024-08-31
Attending: STUDENT IN AN ORGANIZED HEALTH CARE EDUCATION/TRAINING PROGRAM
Payer: MEDICARE

## 2024-08-31 ENCOUNTER — TRANSCRIPTION ENCOUNTER (OUTPATIENT)
Age: 69
End: 2024-08-31

## 2024-08-31 VITALS — HEIGHT: 60 IN

## 2024-08-31 VITALS
DIASTOLIC BLOOD PRESSURE: 65 MMHG | OXYGEN SATURATION: 98 % | TEMPERATURE: 99 F | RESPIRATION RATE: 16 BRPM | HEART RATE: 69 BPM | SYSTOLIC BLOOD PRESSURE: 167 MMHG

## 2024-08-31 DIAGNOSIS — Z98.49 CATARACT EXTRACTION STATUS, UNSPECIFIED EYE: Chronic | ICD-10-CM

## 2024-08-31 DIAGNOSIS — E11.9 TYPE 2 DIABETES MELLITUS WITHOUT COMPLICATIONS: ICD-10-CM

## 2024-08-31 DIAGNOSIS — L29.9 PRURITUS, UNSPECIFIED: ICD-10-CM

## 2024-08-31 DIAGNOSIS — I10 ESSENTIAL (PRIMARY) HYPERTENSION: ICD-10-CM

## 2024-08-31 DIAGNOSIS — Z98.890 OTHER SPECIFIED POSTPROCEDURAL STATES: Chronic | ICD-10-CM

## 2024-08-31 DIAGNOSIS — E78.5 HYPERLIPIDEMIA, UNSPECIFIED: ICD-10-CM

## 2024-08-31 PROCEDURE — 99283 EMERGENCY DEPT VISIT LOW MDM: CPT

## 2024-08-31 PROCEDURE — 99283 EMERGENCY DEPT VISIT LOW MDM: CPT | Mod: FS

## 2024-08-31 RX ORDER — PREDNISONE 20 MG/1
60 TABLET ORAL ONCE
Refills: 0 | Status: COMPLETED | OUTPATIENT
Start: 2024-08-31 | End: 2024-08-31

## 2024-08-31 RX ORDER — DIPHENHYDRAMINE HCL 12.5MG/5ML
1 ELIXIR ORAL
Qty: 20 | Refills: 0
Start: 2024-08-31

## 2024-08-31 RX ORDER — DIPHENHYDRAMINE HCL 12.5MG/5ML
25 ELIXIR ORAL ONCE
Refills: 0 | Status: DISCONTINUED | OUTPATIENT
Start: 2024-08-31 | End: 2024-08-31

## 2024-08-31 RX ORDER — PREDNISONE 20 MG/1
2 TABLET ORAL
Qty: 8 | Refills: 0
Start: 2024-08-31 | End: 2024-09-03

## 2024-08-31 RX ORDER — DIPHENHYDRAMINE HCL 12.5MG/5ML
50 ELIXIR ORAL ONCE
Refills: 0 | Status: COMPLETED | OUTPATIENT
Start: 2024-08-31 | End: 2024-08-31

## 2024-08-31 RX ADMIN — PREDNISONE 60 MILLIGRAM(S): 20 TABLET ORAL at 19:59

## 2024-08-31 RX ADMIN — Medication 50 MILLIGRAM(S): at 19:59

## 2024-09-03 ENCOUNTER — APPOINTMENT (OUTPATIENT)
Dept: HOME HEALTH SERVICES | Facility: HOME HEALTH | Age: 69
End: 2024-09-03

## 2024-09-03 RX ORDER — PREDNISONE 20 MG/1
20 TABLET ORAL
Qty: 10 | Refills: 0 | Status: ACTIVE | COMMUNITY
Start: 2024-09-03

## 2024-09-03 RX ORDER — CAMPHOR 0.45 %
25 GEL (GRAM) TOPICAL 3 TIMES DAILY
Refills: 0 | Status: ACTIVE | COMMUNITY
Start: 2024-09-03

## 2024-09-03 NOTE — H&P PST ADULT - PATIENT ON (OXYGEN DELIVERY METHOD)

## 2024-09-06 ENCOUNTER — NON-APPOINTMENT (OUTPATIENT)
Age: 69
End: 2024-09-06

## 2024-10-01 ENCOUNTER — EMERGENCY (EMERGENCY)
Facility: HOSPITAL | Age: 69
LOS: 0 days | Discharge: HOME | End: 2024-10-01
Attending: EMERGENCY MEDICINE
Payer: MEDICARE

## 2024-10-01 VITALS
HEART RATE: 70 BPM | TEMPERATURE: 98 F | SYSTOLIC BLOOD PRESSURE: 128 MMHG | OXYGEN SATURATION: 98 % | DIASTOLIC BLOOD PRESSURE: 44 MMHG | RESPIRATION RATE: 18 BRPM

## 2024-10-01 VITALS — WEIGHT: 216.71 LBS | HEIGHT: 60 IN

## 2024-10-01 DIAGNOSIS — K21.9 GASTRO-ESOPHAGEAL REFLUX DISEASE WITHOUT ESOPHAGITIS: ICD-10-CM

## 2024-10-01 DIAGNOSIS — Z86.39 PERSONAL HISTORY OF OTHER ENDOCRINE, NUTRITIONAL AND METABOLIC DISEASE: ICD-10-CM

## 2024-10-01 DIAGNOSIS — F32.A DEPRESSION, UNSPECIFIED: ICD-10-CM

## 2024-10-01 DIAGNOSIS — E78.5 HYPERLIPIDEMIA, UNSPECIFIED: ICD-10-CM

## 2024-10-01 DIAGNOSIS — Z98.890 OTHER SPECIFIED POSTPROCEDURAL STATES: Chronic | ICD-10-CM

## 2024-10-01 DIAGNOSIS — L29.9 PRURITUS, UNSPECIFIED: ICD-10-CM

## 2024-10-01 DIAGNOSIS — R21 RASH AND OTHER NONSPECIFIC SKIN ERUPTION: ICD-10-CM

## 2024-10-01 DIAGNOSIS — E11.9 TYPE 2 DIABETES MELLITUS WITHOUT COMPLICATIONS: ICD-10-CM

## 2024-10-01 DIAGNOSIS — Z98.49 CATARACT EXTRACTION STATUS, UNSPECIFIED EYE: Chronic | ICD-10-CM

## 2024-10-01 DIAGNOSIS — I10 ESSENTIAL (PRIMARY) HYPERTENSION: ICD-10-CM

## 2024-10-01 PROCEDURE — 99284 EMERGENCY DEPT VISIT MOD MDM: CPT | Mod: FS

## 2024-10-01 PROCEDURE — 99283 EMERGENCY DEPT VISIT LOW MDM: CPT

## 2024-10-01 RX ORDER — PREDNISONE 10 MG
1 TABLET, DOSE PACK ORAL
Qty: 5 | Refills: 0
Start: 2024-10-01 | End: 2024-10-05

## 2024-10-01 NOTE — ED STATDOCS - PROGRESS NOTE DETAILS
pt comes to the ER for pruritic area to the back of her scalp and was seen here 2 weeks ago for similar and did not follow up with dermatology  will treat with steroids and again advice to see dermatology

## 2024-10-01 NOTE — ED STATDOCS - NSFOLLOWUPCLINICS_GEN_ALL_ED_FT
[facility] - [place]  Dermatology  [address] [place] [id number]  Phone: [phone]  Fax: [phone]

## 2024-10-01 NOTE — ED STATDOCS - ATTENDING APP SHARED VISIT CONTRIBUTION OF CARE
I,Praful Summers MD,  performed the initial face to face bedside interview with this patient regarding history of present illness, review of symptoms and relevant past medical, social and family history.  I completed an independent physical examination.  I was the initial provider who evaluated this patient. I have signed out the follow up of any pending tests (i.e. labs, radiological studies) to the ACP.  I have communicated the patient’s plan of care and disposition with the ACP.  The history, relevant review of systems, past medical and surgical history, medical decision making, and physical examination was documented by the scribe in my presence and I attest to the accuracy of the documentation.

## 2024-10-01 NOTE — ED STATDOCS - PATIENT PORTAL LINK FT
You can access the FollowMyHealth Patient Portal offered by [facility] by registering at the following website: [email or web address]. By joining [employer]’s FollowMyHealth portal, you will also be able to view your health information using other applications (apps) compatible with our system.

## 2024-10-01 NOTE — ED STATDOCS - CLINICAL SUMMARY MEDICAL DECISION MAKING FREE TEXT BOX
Pt with scalp rash for over a month. Questionable reaction to hair dye. Will prescribe short course of steroids. Need for follow up discussed.

## 2024-10-01 NOTE — ED STATDOCS - EYES, MLM
clear bilaterally.  Pupils equal, round, and reactive to light. Posterior Auricular Interpolation Flap Text: A decision was made to reconstruct the defect utilizing an interpolation axial flap and a staged reconstruction.  A telfa template was made of the defect.  This telfa template was then used to outline the posterior auricular interpolation flap.  The donor area for the pedicle flap was then injected with anesthesia.  The flap was excised through the skin and subcutaneous tissue down to the layer of the underlying musculature.  The pedicle flap was carefully excised within this deep plane to maintain its blood supply.  The edges of the donor site were undermined.   The donor site was closed in a primary fashion.  The pedicle was then rotated into position and sutured.  Once the tube was sutured into place, adequate blood supply was confirmed with blanching and refill.  The pedicle was then wrapped with xeroform gauze and dressed appropriately with a telfa and gauze bandage to ensure continued blood supply and protect the attached pedicle.

## 2024-10-01 NOTE — ED STATDOCS - LANGUAGE ASSISTANCE NEEDED
The patient is a 6y1m Male complaining of abdominal pain. Yes-Patient/Caregiver accepts free interpretation services...

## 2024-10-01 NOTE — ED STATDOCS - OBJECTIVE STATEMENT
68 y/o female with a PMHx of constipation, depression, DM, GERD, HTN, HLD, lumbar herniated disc, obesity, pyelonephritis presents to the ED c/o itching. Pt has had a rash for over a month on her scalp, head and neck. Pt continues to have itching, worse at night. Pt uses hair dye. Denies CP, SOB. No other complaints at this time.  ID#: [id number].

## 2024-10-02 ENCOUNTER — APPOINTMENT (OUTPATIENT)
Dept: HOME HEALTH SERVICES | Facility: HOME HEALTH | Age: 69
End: 2024-10-02

## 2024-10-05 NOTE — PHYSICAL THERAPY INITIAL EVALUATION ADULT - ONSET DATE, REHAB EVAL
Pt feeling longer contractions and pressure,     Vitals:    10/05/24 0500   BP: 110/51   Pulse: 75   Resp:    Temp: 97 °F (36.1 °C)       SVE 5.5/80/-2  Concrete: not picking up but pt feeling longer painful contractions q3 min  FHR: moderate variability, cat 1     Cont PPP    Sunita Garcia MD  Obstetrics and Gynecology, PGY4  For OB Triage #  For GYN service #  For L&D and Postpartum #  For Gyn Oncology#      29-Jan-2019

## 2024-10-15 ENCOUNTER — EMERGENCY (EMERGENCY)
Facility: HOSPITAL | Age: 69
LOS: 0 days | Discharge: HOME | End: 2024-10-15
Attending: EMERGENCY MEDICINE
Payer: MEDICARE

## 2024-10-15 VITALS
TEMPERATURE: 98 F | HEART RATE: 82 BPM | DIASTOLIC BLOOD PRESSURE: 61 MMHG | RESPIRATION RATE: 20 BRPM | OXYGEN SATURATION: 97 % | SYSTOLIC BLOOD PRESSURE: 141 MMHG

## 2024-10-15 VITALS — WEIGHT: 217.16 LBS | HEIGHT: 60 IN

## 2024-10-15 DIAGNOSIS — E11.9 TYPE 2 DIABETES MELLITUS WITHOUT COMPLICATIONS: ICD-10-CM

## 2024-10-15 DIAGNOSIS — K21.9 GASTRO-ESOPHAGEAL REFLUX DISEASE WITHOUT ESOPHAGITIS: ICD-10-CM

## 2024-10-15 DIAGNOSIS — Z98.49 CATARACT EXTRACTION STATUS, UNSPECIFIED EYE: Chronic | ICD-10-CM

## 2024-10-15 DIAGNOSIS — E78.5 HYPERLIPIDEMIA, UNSPECIFIED: ICD-10-CM

## 2024-10-15 DIAGNOSIS — I10 ESSENTIAL (PRIMARY) HYPERTENSION: ICD-10-CM

## 2024-10-15 DIAGNOSIS — L25.9 UNSPECIFIED CONTACT DERMATITIS, UNSPECIFIED CAUSE: ICD-10-CM

## 2024-10-15 DIAGNOSIS — R21 RASH AND OTHER NONSPECIFIC SKIN ERUPTION: ICD-10-CM

## 2024-10-15 DIAGNOSIS — E66.9 OBESITY, UNSPECIFIED: ICD-10-CM

## 2024-10-15 DIAGNOSIS — Z98.890 OTHER SPECIFIED POSTPROCEDURAL STATES: Chronic | ICD-10-CM

## 2024-10-15 PROCEDURE — 99284 EMERGENCY DEPT VISIT MOD MDM: CPT

## 2024-10-15 PROCEDURE — 99283 EMERGENCY DEPT VISIT LOW MDM: CPT

## 2024-10-15 RX ORDER — PREDNISONE 5 MG/1
60 TABLET ORAL ONCE
Refills: 0 | Status: COMPLETED | OUTPATIENT
Start: 2024-10-15 | End: 2024-10-15

## 2024-10-15 RX ORDER — ACETAMINOPHEN 325 MG
650 TABLET ORAL ONCE
Refills: 0 | Status: COMPLETED | OUTPATIENT
Start: 2024-10-15 | End: 2024-10-15

## 2024-10-15 RX ORDER — PREDNISONE 5 MG/1
1 TABLET ORAL
Qty: 44 | Refills: 0
Start: 2024-10-15 | End: 2024-10-28

## 2024-10-15 RX ADMIN — PREDNISONE 60 MILLIGRAM(S): 5 TABLET ORAL at 15:04

## 2024-10-15 RX ADMIN — Medication 650 MILLIGRAM(S): at 15:40

## 2024-10-15 NOTE — ED STATDOCS - OBJECTIVE STATEMENT
70 y/o female with a PMHx of constipation, DM, GERD, pyelpnephiris, HLD, HTN, lumbar herniated disc, obesity presents to the ED c/o rash.. Pt has been seen sevral times for rahs. Pt bryant ewmariela otctoerb was on predisone    medial alert bracleet. No jerlweyr. NO new clothing. No travel.  ID#: [id number] 68 y/o female with a PMHx of constipation, DM, GERD, pyelonephritis, HLD, HTN, lumbar herniated disc, obesity presents to the ED c/o rash.. Pt has been seen several times for rash and was on Prednisone. No new jewelry. No new clothing. No travel. No other complaints at this time.  ID#: [id number] 68 y/o female with a PMHx of constipation, DM, GERD, pyelonephritis, HLD, HTN, lumbar herniated disc, obesity presents to the ED c/o rash.. Pt has been seen several times for rash and was on Prednisone 5 day burst. Rash imporved while  on prednisone, but  since stopping, the rash is back No new jewelry, but has worn medical alert neclace. No new clothing. No travel. No other complaints at this time.  ID#: [id number]

## 2024-10-15 NOTE — ED STATDOCS - PROGRESS NOTE DETAILS
69 year old female presents ot the ED complaining of rash to trunk appear consistent with contact dermatitis. No known triggers. Pt with similar rash in the past and treated with prednisone. Will give 2 week taper of steroid and arrange follow up with dermatologist. Strict return precautions were given. All questions and concerns were addressed. - [place] Moulton PA-C

## 2024-10-15 NOTE — ED ADULT TRIAGE NOTE - PAIN RATING/NUMBER SCALE (0-10): ACTIVITY
From: Moustapha Ventura  To: Ahsan Landis  Sent: 4/14/2022 8:56 AM CDT  Subject: cholesterol medication    [facility] Dr. Landis,  I saw your notes on my recent labs and yes I would be ok starting the cholesterol medication.   
0 (no pain/absence of nonverbal indicators of pain)

## 2024-10-15 NOTE — ED STATDOCS - PATIENT PORTAL LINK FT
You can access the FollowMyHealth Patient Portal offered by [facility] by registering at the following website: [email or web address]. By joining [facility]’s FollowMyHealth portal, you will also be able to view your health information using other applications (apps) compatible with our system.

## 2024-10-15 NOTE — ED STATDOCS - PHYSICAL EXAMINATION
Constitutional: NAD AAOx3  Eyes: EOMI, pupils equal  Head: Normocephalic atraumatic  Mouth: no airway obstruction  Cardiac: regular rate   Resp: Lungs CTAB  GI: Abd s/nt/nd  Neuro: CN2-12 intact  Skin: Rash on back of neck down to left upper chest. No rash on rest of back and abd. Constitutional: NAD AAOx3  Eyes: EOMI, pupils equal  Head: Normocephalic atraumatic  Mouth: no airway obstruction  Cardiac: regular rate   Resp: Lungs CTAB  GI: Abd s/nt/nd  Neuro: CN2-12 intact  Skin: Rash on back of neck down to left upper chest. No rash on rest of back and abd. + excoriation,. not draining,

## 2024-10-15 NOTE — ED ADULT TRIAGE NOTE - CHIEF COMPLAINT QUOTE
Pt presents to ED c/o rash and itching to head, neck and left chest area x weeks. Pt states "I was seen here last time for same thing and the steroids really help. I saw my PCP who put me on an allergy med and that's not helping at all."

## 2024-10-15 NOTE — ED STATDOCS - CLINICAL SUMMARY MEDICAL DECISION MAKING FREE TEXT BOX
Pt with rash, possible contact dermatitis. Will send 2 week taper steroids, encourage to follow up with derm.

## 2024-10-15 NOTE — ED STATDOCS - NSFOLLOWUPINSTRUCTIONS_ED_ALL_ED_FT
Dermatitis de contacto  Contact Dermatitis  La dermatitis es el enrojecimiento, el dolor y la hinchazón (inflamación) de la piel. La dermatitis de contacto es scooter reacción a ciertas sustancias que entran en contacto con la piel. Hay dos tipos de esta afección:  Dermatitis de contacto irritativa. Tamy es el tipo más frecuente. Sucede cuando algo irrita la piel, fifi cuando se secan las marie por lavarlas muy seguido con jabón. Puede tener tamy tipo de reacción incluso si no ha estado expuesto al irritante antes.  Dermatitis de contacto alérgica. La causa de tamy tipo de dermatitis es scooter sustancia a la cual se es alérgico, fifi la hiedra venenosa. Se produce cuando se ha estado expuesto a la sustancia (alérgeno) y se genera scooter sensibilidad a bernice. En algunos casos, la reacción puede comenzar poco después de la primera exposición al alérgeno. En otros casos, es posible que no comience hasta que vuelva a estar expuesto al alérgeno. Puede ocurrir cada vez que se exponga al alérgeno en el futuro.  ¿Cuáles son las causas?  La causa de la dermatitis de contacto irritativa suele ser la exposición a lo siguiente:  Maquillaje.  Jabones, detergentes y lejías.  Ácidos.  Sales metálicas, fifi el níquel.  La causa de la dermatitis de contacto alérgica suele ser la exposición a lo siguiente:  Plantas venenosas.  Productos químicos.  Alhajas.  Látex.  Medicamentos.  Conservantes que se utilizan en determinados productos, fifi la ropa.  ¿Qué incrementa el riesgo?  Es más probable que tenga esta afección si tiene:  Un trabajo que lo expone a sustancias irritantes o a alérgenos.  Ciertas afecciones crónicas. Entre ellas, asma y eccema.  ¿Cuáles son los signos o síntomas?  A person's hands, showing areas of redness and blisters caused by contact dermatitis.  Los síntomas de esta afección pueden presentarse en cualquier parte del cuerpo donde la sustancia irritante lo haya tocado.  Algunos de los síntomas son los siguientes:  Sequedad, descamación o agrietamiento.  Enrojecimiento.  Picazón.  Dolor o sensación de ardor.  Ampollas.  Secreción de pequeñas cantidades de vick o líquido transparente que emanan de las grietas de la piel.  En el vignesh de la dermatitis de contacto alérgica, puede robert hinchazón solo en algunas partes del cuerpo, fifi la boca o los genitales.    ¿Cómo se diagnostica?  Esta afección se diagnostica mediante scooter revisión de los antecedentes médicos y un examen físico.  Es posible que le milla la prueba del parche para averiguar la causa.  Si la afección guarda relación con el trabajo, tulio vez deba consultar a un experto en problemas de annalee en el lugar de trabajo (especialista en medicina ocupacional).  ¿Cómo se trata?  Esta afección se trata manteniéndose lejos de la causa de la reacción y protegiendo la piel de nuevos contactos. El tratamiento también puede incluir lo siguiente:  Cremas o ungüentos con corticoesteroides. Puede ser necesario benton medicamentos con corticoesteroides por boca (vía oral) en los casos más graves.  Aplicación de antibióticos u otros medicamentos en la piel para eliminar bacterias (ungüentos antibacterianos). Estos pueden necesitarse si existe scooter infección en la piel.  Antihistamínicos. Estos pueden tomarse por vía oral o aplicarse fifi loción para aliviar la picazón.  Scooter venda (vendaje).  Siga estas indicaciones en suggs casa:  Cuidado de la piel    Huméctese la piel según sea necesario.  Póngase paños húmedos y frescos (compresas frías) en las zonas afectadas.  Intente colocarse scooter pasta de bicarbonato de sodio sobre la piel. Agregue agua al bicarbonato y revuelva hasta que tenga la consistencia de scooter pasta.  No se rasque la piel. Evite la fricción en la niall afectada.  Evite el uso de jabones, perfumes y tintes.  Controle todos los eric las zonas afectadas para detectar signos de infección. Esté atento a los siguientes signos:  Aumento del enrojecimiento, la hinchazón o el dolor.  Más líquido o vick.  Calor.  Pus o mal olor.  Medicamentos    Ahuimanu o aplíquese los medicamentos de venta thuan y los recetados solamente fifi se lo haya indicado el médico.  Si le recetaron antibióticos, tómelos o aplíqueselos fifi se lo haya indicado el médico. No deje de usar el antibiótico aunque comience a sentirse mejor.  Sarah    Trate de bentno un baño con lo siguiente:  Sales de [place]. Siga las instrucciones del envase. Puede conseguirlas en la mya de comestibles o la farmacia local.  Bicarbonato de sodio. Vierta un poco en la bañera fifi se lo haya indicado el médico.  Robe coloidal. Siga las instrucciones del envase. Puede conseguirla en la mya de comestibles o la farmacia local.  Báñese con menos frecuencia. [place] puede significar bañarse cada dos días.  Báñese con agua templada. No use [place].  Cuidado de la venda    Si le colocaron un vendaje, cámbielo fifi se lo haya indicado el médico.  Lávese las marie con agua y jabón sarwat al menos 20 segundos antes y después de cambiar el vendaje. Use desinfectante para marie si no dispone de agua y jabón.  Indicaciones generales    Evite la sustancia que blanchard causado la erupción. Si no sabe qué la causó, lleve un diario para tratar de identificar la causa. Escriba los siguientes datos:  Lo que come y salvatore.  Qué productos cosméticos usa.  Lo que llevó puesto en la niall afectada. [place] incluye las alhajas.  Comuníquese con un médico si:  La afección no mejora con el tratamiento.  Suggs afección empeora.  Tiene signos de infección.  Tiene fiebre.  Aparecen nuevos síntomas.  El hueso o la articulación que se encuentra por debajo de la niall afectada le duelen después de que la piel ya se ha curado.  Solicite ayuda de inmediato si:  Nota scooter línea shannon en la piel que sale de la niall afectada.  La niall afectada se oscurece.  Tiene dificultad para respirar.  Esta información no tiene fifi fin reemplazar el consejo del médico. Asegúrese de hacerle al médico cualquier pregunta que tenga.

## 2024-10-16 ENCOUNTER — APPOINTMENT (OUTPATIENT)
Dept: HOME HEALTH SERVICES | Facility: HOME HEALTH | Age: 69
End: 2024-10-16

## 2024-10-16 DIAGNOSIS — G47.00 INSOMNIA, UNSPECIFIED: ICD-10-CM

## 2024-10-16 DIAGNOSIS — K21.9 GASTRO-ESOPHAGEAL REFLUX DISEASE W/OUT ESOPHAGITIS: ICD-10-CM

## 2024-10-16 LAB — HBA1C MFR BLD HPLC: 7.3

## 2024-10-16 RX ORDER — GLUCOSAMINE HCL/CHONDROITIN SU 500-400 MG
3 CAPSULE ORAL AT BEDTIME
Qty: 30 | Refills: 1 | Status: ACTIVE | COMMUNITY
Start: 2024-10-16 | End: 1900-01-01

## 2024-10-16 RX ORDER — FAMOTIDINE 20 MG/1
20 TABLET, FILM COATED ORAL
Qty: 60 | Refills: 1 | Status: ACTIVE | COMMUNITY
Start: 2024-10-16 | End: 1900-01-01

## 2024-12-05 ENCOUNTER — NON-APPOINTMENT (OUTPATIENT)
Age: 69
End: 2024-12-05

## 2024-12-06 ENCOUNTER — NON-APPOINTMENT (OUTPATIENT)
Age: 69
End: 2024-12-06

## 2024-12-10 ENCOUNTER — EMERGENCY (EMERGENCY)
Facility: HOSPITAL | Age: 69
LOS: 0 days | Discharge: HOME | End: 2024-12-10
Attending: EMERGENCY MEDICINE
Payer: MEDICARE

## 2024-12-10 VITALS
SYSTOLIC BLOOD PRESSURE: 144 MMHG | OXYGEN SATURATION: 100 % | DIASTOLIC BLOOD PRESSURE: 48 MMHG | HEART RATE: 68 BPM | TEMPERATURE: 99 F | RESPIRATION RATE: 18 BRPM

## 2024-12-10 VITALS — HEIGHT: 60 IN

## 2024-12-10 DIAGNOSIS — K21.9 GASTRO-ESOPHAGEAL REFLUX DISEASE WITHOUT ESOPHAGITIS: ICD-10-CM

## 2024-12-10 DIAGNOSIS — Z98.890 OTHER SPECIFIED POSTPROCEDURAL STATES: Chronic | ICD-10-CM

## 2024-12-10 DIAGNOSIS — F32.A DEPRESSION, UNSPECIFIED: ICD-10-CM

## 2024-12-10 DIAGNOSIS — E78.5 HYPERLIPIDEMIA, UNSPECIFIED: ICD-10-CM

## 2024-12-10 DIAGNOSIS — M51.26 OTHER INTERVERTEBRAL DISC DISPLACEMENT, LUMBAR REGION: ICD-10-CM

## 2024-12-10 DIAGNOSIS — E11.9 TYPE 2 DIABETES MELLITUS WITHOUT COMPLICATIONS: ICD-10-CM

## 2024-12-10 DIAGNOSIS — Z98.49 CATARACT EXTRACTION STATUS, UNSPECIFIED EYE: Chronic | ICD-10-CM

## 2024-12-10 DIAGNOSIS — M54.50 LOW BACK PAIN, UNSPECIFIED: ICD-10-CM

## 2024-12-10 DIAGNOSIS — I10 ESSENTIAL (PRIMARY) HYPERTENSION: ICD-10-CM

## 2024-12-10 PROCEDURE — 99283 EMERGENCY DEPT VISIT LOW MDM: CPT | Mod: 25

## 2024-12-10 PROCEDURE — 99284 EMERGENCY DEPT VISIT MOD MDM: CPT

## 2024-12-10 PROCEDURE — 96372 THER/PROPH/DIAG INJ SC/IM: CPT

## 2024-12-10 RX ORDER — KETOROLAC TROMETHAMINE 30 MG/ML
30 INJECTION INTRAMUSCULAR; INTRAVENOUS ONCE
Refills: 0 | Status: DISCONTINUED | OUTPATIENT
Start: 2024-12-10 | End: 2024-12-10

## 2024-12-10 RX ORDER — LIDOCAINE 40 MG/G
1 CREAM TOPICAL ONCE
Refills: 0 | Status: COMPLETED | OUTPATIENT
Start: 2024-12-10 | End: 2024-12-10

## 2024-12-10 RX ADMIN — KETOROLAC TROMETHAMINE 30 MILLIGRAM(S): 30 INJECTION INTRAMUSCULAR; INTRAVENOUS at 14:41

## 2024-12-10 RX ADMIN — LIDOCAINE 1 PATCH: 40 CREAM TOPICAL at 14:41

## 2024-12-10 NOTE — ED STATDOCS - CLINICAL SUMMARY MEDICAL DECISION MAKING FREE TEXT BOX
Pt with low back pain consistent with possible sciatica. Will give antiinflammatories and recommend primary MD followup.

## 2024-12-10 NOTE — ED STATDOCS - MUSCULOSKELETAL, MLM
tenderness over R buttock, no swelling, no rash, no midline spine tenderness, no leg weakness, patient ambulates with walker

## 2024-12-10 NOTE — ED STATDOCS - PROGRESS NOTE DETAILS
pt with Hx of back pain and herniated disc seen with ER attending for acute back pain and left leg pain  taking Tylenol at home has not been to her back doctor for the increasing pain. Informed the patient of the need to follow up with her back doctor to establish a treatment plan.

## 2024-12-10 NOTE — ED STATDOCS - PATIENT PORTAL LINK FT
You can access the FollowMyHealth Patient Portal offered by [facility] by registering at the following website: [email or web address]. By joining [employer]’s FollowMyHealth portal, you will also be able to view your health information using other applications (apps) compatible with our system.

## 2024-12-10 NOTE — ED STATDOCS - NSTIMEPROVIDERCAREINITIATE_GEN_ER
Pharmacist Clinic: Diabetes Management  Casey Raya is a 70 y.o. male was referred to Clinical Pharmacy Team for diabetes management.   Referring Provider: Ruthie Renee DO    Diabetes  He presents for his follow-up diabetic visit. He has type 2 diabetes mellitus. His disease course has been stable. There are no hypoglycemic complications. There are no diabetic complications. Risk factors for coronary artery disease include diabetes mellitus, dyslipidemia, hypertension and male sex. Current diabetic treatments: GLP1a. He is compliant with treatment all of the time. His weight is stable. There is no change in his home blood glucose trend. An ACE inhibitor/angiotensin II receptor blocker is being taken.   Since last visit, stopped metformin and increased Ozempic to 1mg weekly. Improved but persistent diarrhea. Using pepto bismol as needed with some relief.  Worsened nerve pain, self increased gabapentin to 600mg TID    Enjoys FS Brandon, just placed first brandon 3 plus sensor, no issues. Side sleeper - false lows overnight with sensor ; in a FB group for Brandon peer support     Pertinent PMH Review:  - PMH of Pancreatitis: No  - PMH/FH of Medullary Thyroid Cancer: No  - PMH of Retinopathy: No  - PMH of Urinary Tract Infections: No     LAB REVIEW   Lab Results   Component Value Date    LDLCALC 75 02/02/2024    CREATININE 0.59 02/02/2024      Lab Results   Component Value Date    HGBA1C 6.0 09/16/2024    HGBA1C 6.2 05/13/2024    HGBA1C 7.4 (H) 02/02/2024     Lab Results   Component Value Date    TRIG 257 (H) 02/02/2024    TRIG 263 (H) 11/18/2022    TRIG 174 (H) 09/15/2021     The 10-year ASCVD risk score (Jonatan MARISCAL, et al., 2019) is: 28.7%    Values used to calculate the score:      Age: 70 years      Sex: Male      Is Non- : No      Diabetic: Yes      Tobacco smoker: No      Systolic Blood Pressure: 124 mmHg      Is BP treated: No      HDL Cholesterol: 33.1 mg/dL      Total Cholesterol: 159  mg/dL     DIABETES ASSESSMENT  CURRENT PHARMACOTHERAPY  - Ozempic 1mg subcutaneous once weekly (Thursdays)    Allergies: Bupropion, Cat dander, Dog dander, Oxycodone-acetaminophen, and Prednisone     SECONDARY PREVENTION  - Statin? Yes   - ACE-I/ARB? Yes    HISTORICAL PHARMACOTHERAPY  - Farxiga (urinary frequency)  - Metformin (mild GI s/e, replaced with Ozempic)  - H/o myaglia with atorvastatin, now on simvastatin with COQ10 and tolerating     SMBG MEASUREMENTS  Patient is using: continuous glucose monitor [employer] Brandon 3 Plus + glucometer   None > 150 or < 70  Patient does not report symptoms of hypoglycemia.   Patient does not report symptoms of hyperglycemia.     AFFORDABILITY/ADHERENCE   - No cost or adherence barriers identified     Assessment/Plan   Problem List Items Addressed This Visit       Diabetes mellitus (Multi)    Relevant Medications    tirzepatide (Mounjaro) 5 mg/0.5 mL pen injector    Other Relevant Orders    Referral to Clinical Pharmacy       Patients diabetes is well controlled with most recent A1c of 6% (Goal < 7%).   Discontinue Ozempic due to persistent diarrhea   Initiate Mounjaro 5mg subcutaneous once weekly    Education Provided to Patient:   Counseled patient on Mounjaro MOA, expectations, side effects, duration of therapy, administration, and monitoring parameters.  Provided detailed dosing and administration counseling to ensure proper technique.   Reviewed Mounjaro titration schedule, starting with 2.5 mg once weekly to a goal of 15 mg once weekly if tolerated  Counseled patient on the benefits of GLP-1ra glycemic control and weight loss  Reviewed storage requirements of Mounjaro when not in use, and when to administer the medication if a dose is missed.  Advised patient that they may experience improved satiety after meals and portion sizes of meals may be reduced as doses of Mounjaro increase.     PharmD Follow-up: 1/7/25   PCP Follow-up: 2/17/25    Notify your healthcare provider if you  have any of the following:  -Diarrhea or vomiting for more than six hours  -Blood sugar >300 mg/dL more than once  -Low blood sugar (<70 mg/dL)  -Questions about your medications    Thank you,  Yanet Reed, PharmD    Continue all meds under the continuation of care with the referring provider and clinical pharmacy team.  Verbal consent to manage patient's drug therapy was obtained. They were informed they may decline to participate or withdraw from participation in pharmacy services at any time.    18-Feb-2024 14:38

## 2024-12-10 NOTE — ED ADULT TRIAGE NOTE - CHIEF COMPLAINT QUOTE
PT presents to the ED for back pain radiating to legs x 2 weeks. pt denies injury, urinating issues. no other complaints. NKDA

## 2024-12-10 NOTE — ED STATDOCS - OBJECTIVE STATEMENT
70 y/o female w/ PMHx depression, pyelonephritis, GERD, lumbar herniated disc, obesity, HLD, HTN, and DM presents c/o back pain x1-2w. Pt has been taking Tylenol for the pain. Pt has seen outpatient doctor for this issue before. No other complaints at this time.    used #[id number].

## 2024-12-11 ENCOUNTER — APPOINTMENT (OUTPATIENT)
Dept: HOME HEALTH SERVICES | Facility: HOME HEALTH | Age: 69
End: 2024-12-11

## 2024-12-13 ENCOUNTER — APPOINTMENT (OUTPATIENT)
Dept: HOME HEALTH SERVICES | Facility: HOME HEALTH | Age: 69
End: 2024-12-13

## 2025-01-13 ENCOUNTER — EMERGENCY (EMERGENCY)
Facility: HOSPITAL | Age: 70
LOS: 0 days | Discharge: HOME | End: 2025-01-13
Attending: EMERGENCY MEDICINE
Payer: MEDICARE

## 2025-01-13 VITALS — HEIGHT: 60 IN | WEIGHT: 210.1 LBS

## 2025-01-13 VITALS
RESPIRATION RATE: 18 BRPM | TEMPERATURE: 98 F | HEART RATE: 75 BPM | SYSTOLIC BLOOD PRESSURE: 145 MMHG | DIASTOLIC BLOOD PRESSURE: 60 MMHG | OXYGEN SATURATION: 99 %

## 2025-01-13 DIAGNOSIS — I10 ESSENTIAL (PRIMARY) HYPERTENSION: ICD-10-CM

## 2025-01-13 DIAGNOSIS — F32.A DEPRESSION, UNSPECIFIED: ICD-10-CM

## 2025-01-13 DIAGNOSIS — M54.32 SCIATICA, LEFT SIDE: ICD-10-CM

## 2025-01-13 DIAGNOSIS — K21.9 GASTRO-ESOPHAGEAL REFLUX DISEASE WITHOUT ESOPHAGITIS: ICD-10-CM

## 2025-01-13 DIAGNOSIS — E11.9 TYPE 2 DIABETES MELLITUS WITHOUT COMPLICATIONS: ICD-10-CM

## 2025-01-13 DIAGNOSIS — E78.5 HYPERLIPIDEMIA, UNSPECIFIED: ICD-10-CM

## 2025-01-13 DIAGNOSIS — Z98.49 CATARACT EXTRACTION STATUS, UNSPECIFIED EYE: Chronic | ICD-10-CM

## 2025-01-13 DIAGNOSIS — Z98.890 OTHER SPECIFIED POSTPROCEDURAL STATES: Chronic | ICD-10-CM

## 2025-01-13 PROCEDURE — 72131 CT LUMBAR SPINE W/O DYE: CPT | Mod: 26

## 2025-01-13 PROCEDURE — 99284 EMERGENCY DEPT VISIT MOD MDM: CPT

## 2025-01-13 PROCEDURE — 96372 THER/PROPH/DIAG INJ SC/IM: CPT

## 2025-01-13 PROCEDURE — 99284 EMERGENCY DEPT VISIT MOD MDM: CPT | Mod: 25

## 2025-01-13 PROCEDURE — 72131 CT LUMBAR SPINE W/O DYE: CPT | Mod: MC

## 2025-01-13 RX ORDER — KETOROLAC TROMETHAMINE 30 MG/ML
30 INJECTION INTRAMUSCULAR; INTRAVENOUS ONCE
Refills: 0 | Status: DISCONTINUED | OUTPATIENT
Start: 2025-01-13 | End: 2025-01-13

## 2025-01-13 RX ORDER — PREDNISONE 5 MG
1 TABLET ORAL
Qty: 5 | Refills: 0
Start: 2025-01-13 | End: 2025-01-17

## 2025-01-13 RX ADMIN — KETOROLAC TROMETHAMINE 30 MILLIGRAM(S): 30 INJECTION INTRAMUSCULAR; INTRAVENOUS at 14:39

## 2025-01-13 NOTE — ED ADULT NURSE REASSESSMENT NOTE - NS ED NURSE REASSESS COMMENT FT1
Pt requesting assistance with obtaining a ride home. CARRIE, Darling,  up uber for pt. Pt ambulated w/ RN to ride. Pt has no complaints. Safety and comfort maintained.

## 2025-01-13 NOTE — ED STATDOCS - PATIENT PORTAL LINK FT
You can access the FollowMyHealth Patient Portal offered by [facility] by registering at the following website: [email or web address]. By joining [employer]’s FollowMyHealth portal, you will also be able to view your health information using other applications (apps) compatible with our system.

## 2025-01-13 NOTE — ED STATDOCS - CLINICAL SUMMARY MEDICAL DECISION MAKING FREE TEXT BOX
69 year-old female with chronic sciatica. Will treat pain, CT scan low back, and recommend ortho followup.

## 2025-01-13 NOTE — ED STATDOCS - NSFOLLOWUPINSTRUCTIONS_ED_ALL_ED_FT
Juan un seguimiento con suggs pmd dentro de las 48 horas: muestre copias de los resultados de ER   [place] motrin 400 mg cada 6 horas según sea necesario para el dolor.   [place] tylenol 650 mg cada 4 a 6 horas según sea necesario para el dolor.   Seguimiento con el médico de columna.    La ciática es el dolor, entumecimiento, debilidad u hormigueo a lo juana del nervio ciático. El nervio ciático comienza en la parte inferior de la espalda y desciende por la parte posterior de cada pierna. Controla los músculos en la parte inferior de las piernas y en la parte posterior de las rodillas. También proporciona sensibilidad a la parte posterior de los muslos, la parte inferior de las piernas y la planta de los pies. La ciática es un síntoma de otra afección que ejerce presión o “pellizca” el nervio ciático.    Con mayor frecuencia, la ciática afecta a un solo lado del cuerpo. Suele desaparecer por sí anneliese o con tratamiento. En algunos casos, la ciática puede volver (ser recurrente).    ¿Cuáles son las causas?  Esta afección es causada por scooter presión sobre el nervio ciático o “pellizco” del nervio ciático. [place] puede ser el resultado de:  Un disco que sobresale demasiado entre los huesos de la columna vertebral (hernia de disco).  Cambios en los discos vertebrales relacionados con la edad.  Un trastorno doloroso que afecta un músculo de las nalgas.  Un crecimiento óseo adicional cerca del nervio ciático.  Scooter rotura (fractura) de la pelvis.  Embarazo.  Tumor. [place] es poco frecuente.  ¿Qué incrementa el riesgo?  Los siguientes factores pueden hacer que sea más propenso a desarrollar esta afección:  Practicar deportes que ponen presión o tensión sobre la columna vertebral.  Tener poca fuerza y flexibilidad.  Antecedentes de cirugía o lesión en la espalda.  Estar sentado jae largos períodos de tiempo.  Realizar actividades que requieren agacharse o levantar objetos en forma repetida.  Obesidad.  ¿Cuáles son los signos o síntomas?  Los síntomas pueden variar de leves a muy graves. Pueden incluir los siguientes:  Cualquiera de los siguientes problemas en la parte inferior de la espalda, las piernas, la cadera o las nalgas:  Hormigueo leve, adormecimiento o dolor sordo.  Sensación de ardor.  Dolor magnolia.  Adormecimiento de la parte posterior de la pantorrilla o la planta del pie.  Debilidad en las piernas.  Dolor de espalda intenso que dificulta el movimiento.  Los síntomas podrían empeorar al toser, estornudar o reírse, o cuando se está sentado o de pie jae períodos prolongados.    ¿Cómo se diagnostica?  Esta afección se puede diagnosticar en función de lo siguiente:  Los síntomas y los antecedentes médicos.  Un examen físico.  Pruebas de vick.  Pruebas de diagnóstico por imágenes, por ejemplo:  Radiografías.  Scooter resonancia magnética (RM).  Scooter exploración por tomografía computarizada (TC).  ¿Cómo se trata?  En muchos casos, esta afección mejora por sí anneliese, sin tratamiento. Sin embargo, el tratamiento puede incluir:  Reducción o modificación la actividad física.  Hacer ejercicio, incluido el fortalecimiento y la elongación.  Aplicación de calor o hielo en la niall afectada.  Medicamentos para lo siguiente:  Aliviar el dolor y la inflamación.  Relajan los músculos.  Medicamentos inyectables que ayudan a aliviar el dolor y la inflamación (corticoesteroides) alrededor del nervio ciático.  Cirugía.  Siga estas instrucciones en suggs casa:  Medicamentos    Use los medicamentos de venta thuan y los recetados solamente fifi se lo haya indicado el médico.  Pregúntele al médico si el medicamento recetado le impide conducir o usar maquinaria pesada.  Control del dolor    Bag of ice on a towel on the skin.  A heating pad for use on the affected area.  Si se lo indican, aplique hielo sobre la niall afectada. Para hacer esto:  Ponga el hielo en scooter bolsa plástica.  Coloque scooter toalla entre la piel y la bolsa.  Aplique el hielo jae 20 minutos, 2 a 3 veces por día.  Si la piel se le pone de color juarez brillante, retire el hielo de inmediato para evitar daños en la piel. El riesgo de daño en la piel es mayor si no puede sentir dolor, calor o frío.  Si se lo indican, aplique calor en la niall afectada con la frecuencia que le haya indicado el médico. Use la aria de calor que el médico le recomiende, fifi scooter compresa de calor húmedo o scooter almohadilla térmica.  Coloque scooter toalla entre la piel y la aria de calor.  Aplique calor jae 20 a 30 minutos.  Si la piel se le pone de color juarez brillante, retire el calor de inmediato para evitar quemaduras. El riesgo de quemaduras es mayor si no puede sentir el dolor, el calor o el frío.  Actividad    A comparison showing the right and wrong way to lift a heavy object.  Retome irene actividades normales fifi se lo haya indicado el médico. Pregúntele al médico qué actividades son seguras para usted.  Evite las actividades que empeoran los síntomas.  Jae el día, descanse jae lapsos breves.  Cuando descanse jae períodos más largos, incorpore alguna actividad suave o ejercicios de elongación entre los períodos de descanso. [place] ayudará a evitar la rigidez y el dolor.  Evite estar sentado jae largos períodos de tiempo sin moverse. Levántese y muévase al menos scooter vez cada hora.  Juan ejercicio y elongue con regularidad, fifi se lo haya indicado el médico.  No levante objetos que pesen más de 10 libras (4.5 kg) hasta que el médico le diga que es seguro. Aunque no tenga síntomas, evite levantar objetos pesados, en especial en forma repetida.  Siempre use las técnicas de levantamiento correctas para levantar objetos, entre ellas:  Flexionar las rodillas.  Mantener la carga cerca del cuerpo.  No torcerse.  Instrucciones generales    Mantenga un peso saludable. El exceso de peso ejerce tensión adicional sobre la espalda.  Use calzado con buen apoyo y cómodo. Evite usar tacones.  Evite dormir sobre un colchón que sea demasiado blando o demasiado shawn. Un colchón que ofrezca un apoyo suficientemente firme para suggs espalda al dormir puede ayudar a aliviar el dolor.  Comuníquese con un médico si:  El dolor no se damon con los medicamentos.  El dolor no mejora o empeora.  Los síntomas olivarez más de 4 semanas.  [place]aja de peso sin causa aparente.  Solicite ayuda de inmediato si:  No puede controlar cuándo orinar o defecar (incontinencia).  Tiene lo siguiente:  Debilidad que empeora en la parte inferior de la espalda, la pelvis, las nalgas o las piernas.  Enrojecimiento o inflamación en la espalda.  Sensación de ardor al orinar.  Resumen  La ciática es dolor, entumecimiento, debilidad u hormigueo a lo juana del trayecto del nervio ciático, que puede incluir la parte inferior de la espalda, las piernas, las caderas y las nalgas.  Esta afección es causada por scooter presión sobre el nervio ciático o “pellizco” del nervio ciático.  El tratamiento a menudo incluye reposo, ejercicios, medicamentos y aplicar hielo o calor.  Esta información no tiene fifi fin reemplazar el consejo del médico. Asegúrese de hacerle al médico cualquier pregunta que tenga.

## 2025-01-13 NOTE — ED STATDOCS - ATTENDING APP SHARED VISIT CONTRIBUTION OF CARE
I personally saw the patient with the MARTINEZ, and completed the key components of the history and physical exam. I then discussed the management plan with the MARTINEZ.

## 2025-01-13 NOTE — ED STATDOCS - OBJECTIVE STATEMENT
68 y/o female w/ PMHx pyelonephritis, depression, GERD, lumbar herniated disc, obesity, HTN, HLD, and DM presents complaining of back pain radiating down L leg. Seen recently for same, started on steroids, patient got better, but pain has returned. Reports difficulty ambulating, no trauma or bowel/bladder complaints. No other complaints at this time.

## 2025-01-13 NOTE — ED ADULT TRIAGE NOTE - TELEPHONIC ID NUMBER OF THE INTERPRETER
[id number] Star Wedge Flap Text: The defect edges were debeveled with a #15 scalpel blade.  Given the location of the defect, shape of the defect and the proximity to free margins a star wedge flap was deemed most appropriate.  Using a sterile surgical marker, an appropriate rotation flap was drawn incorporating the defect and placing the expected incisions within the relaxed skin tension lines where possible. The area thus outlined was incised deep to adipose tissue with a #15 scalpel blade.  The skin margins were undermined to an appropriate distance in all directions utilizing iris scissors.

## 2025-01-13 NOTE — ED ADULT TRIAGE NOTE - CHIEF COMPLAINT QUOTE
ambulatory into ED with c/o left hip pain extending down the leg all the way into her foot. patient reports she has had this pain for 3-4 weeks, unable to get an apt with her doctor because he is busy.

## 2025-01-13 NOTE — ED STATDOCS - MUSCULOSKELETAL, MLM
tenderness L low back midline, tenderness to palpation L hip and rotation of hip, motor 5/5 both lower extremities

## 2025-01-13 NOTE — ED STATDOCS - PROGRESS NOTE DETAILS
MORIS Serrano: ct reviewed and discussed results using interperter #[id number]. Pt to follow up with spine outpt. Pt agrees with plan and stable for dc. Pt ambulating with walker in ED. MORIS Serrano: I participated in the care of this patient. I agree with the history, physical and plan.

## 2025-01-13 NOTE — CHART NOTE - NSCHARTNOTEFT_GEN_A_CORE
SW consulted to assist with pt's transport home.   Pt medically cleared for discharge and does not have a ride home. Pt ambulates with rollator and bus transport is not appropriate at this time. Taxi= $30 vs [employer] ride = $19.21. 3:45pm [employer] ride arranged to pt's home: [address] [facility]. Pt appreciative and was witnessed by SW getting into car. Consult closed.

## 2025-01-13 NOTE — ED STATDOCS - SKIN, MLM
60 y.o male presenting with chest pain for x3 days that is reproducible on exam. Patient has history of HTN. Will do labs, EKG, CXR, and reassess.
skin normal color for race, warm, dry and intact.

## 2025-01-14 ENCOUNTER — NON-APPOINTMENT (OUTPATIENT)
Age: 70
End: 2025-01-14

## 2025-01-14 ENCOUNTER — APPOINTMENT (OUTPATIENT)
Dept: HOME HEALTH SERVICES | Facility: HOME HEALTH | Age: 70
End: 2025-01-14

## 2025-03-26 ENCOUNTER — APPOINTMENT (OUTPATIENT)
Dept: HOME HEALTH SERVICES | Facility: HOME HEALTH | Age: 70
End: 2025-03-26

## 2025-04-21 ENCOUNTER — EMERGENCY (EMERGENCY)
Facility: HOSPITAL | Age: 70
LOS: 0 days | Discharge: HOME | End: 2025-04-21
Attending: STUDENT IN AN ORGANIZED HEALTH CARE EDUCATION/TRAINING PROGRAM
Payer: COMMERCIAL

## 2025-04-21 VITALS
RESPIRATION RATE: 18 BRPM | DIASTOLIC BLOOD PRESSURE: 63 MMHG | HEART RATE: 73 BPM | SYSTOLIC BLOOD PRESSURE: 140 MMHG | OXYGEN SATURATION: 99 % | TEMPERATURE: 98 F

## 2025-04-21 VITALS
DIASTOLIC BLOOD PRESSURE: 65 MMHG | OXYGEN SATURATION: 96 % | WEIGHT: 210.1 LBS | HEART RATE: 70 BPM | TEMPERATURE: 98 F | SYSTOLIC BLOOD PRESSURE: 177 MMHG | RESPIRATION RATE: 16 BRPM

## 2025-04-21 DIAGNOSIS — K21.9 GASTRO-ESOPHAGEAL REFLUX DISEASE WITHOUT ESOPHAGITIS: ICD-10-CM

## 2025-04-21 DIAGNOSIS — I10 ESSENTIAL (PRIMARY) HYPERTENSION: ICD-10-CM

## 2025-04-21 DIAGNOSIS — Z98.890 OTHER SPECIFIED POSTPROCEDURAL STATES: Chronic | ICD-10-CM
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
## 2025-04-21 DIAGNOSIS — M54.50 LOW BACK PAIN, UNSPECIFIED: ICD-10-CM

## 2025-04-21 DIAGNOSIS — E11.9 TYPE 2 DIABETES MELLITUS WITHOUT COMPLICATIONS: ICD-10-CM

## 2025-04-21 DIAGNOSIS — E78.5 HYPERLIPIDEMIA, UNSPECIFIED: ICD-10-CM

## 2025-04-21 DIAGNOSIS — Z98.49 CATARACT EXTRACTION STATUS, UNSPECIFIED EYE: Chronic | ICD-10-CM
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
## 2025-04-21 LAB
ALBUMIN SERPL ELPH-MCNC: 3.6 G/DL — SIGNIFICANT CHANGE UP (ref 3.3–5)
ALP SERPL-CCNC: 158 U/L — HIGH (ref 40–120)
ALT FLD-CCNC: 65 U/L — SIGNIFICANT CHANGE UP (ref 12–78)
ANION GAP SERPL CALC-SCNC: 5 MMOL/L — SIGNIFICANT CHANGE UP (ref 5–17)
APTT BLD: 30.6 SEC — SIGNIFICANT CHANGE UP (ref 24.5–35.6)
AST SERPL-CCNC: 54 U/L — HIGH (ref 15–37)
BASOPHILS # BLD AUTO: 0.04 K/UL — SIGNIFICANT CHANGE UP (ref 0–0.2)
BASOPHILS NFR BLD AUTO: 0.8 % — SIGNIFICANT CHANGE UP (ref 0–2)
BILIRUB SERPL-MCNC: 0.5 MG/DL — SIGNIFICANT CHANGE UP (ref 0.2–1.2)
BLD GP AB SCN SERPL QL: SIGNIFICANT CHANGE UP
BUN SERPL-MCNC: 18 MG/DL — SIGNIFICANT CHANGE UP (ref 7–23)
CALCIUM SERPL-MCNC: 9.7 MG/DL — SIGNIFICANT CHANGE UP (ref 8.5–10.1)
CHLORIDE SERPL-SCNC: 106 MMOL/L — SIGNIFICANT CHANGE UP (ref 96–108)
CO2 SERPL-SCNC: 28 MMOL/L — SIGNIFICANT CHANGE UP (ref 22–31)
CREAT SERPL-MCNC: 0.64 MG/DL — SIGNIFICANT CHANGE UP (ref 0.5–1.3)
EGFR: 96 ML/MIN/1.73M2 — SIGNIFICANT CHANGE UP
EGFR: 96 ML/MIN/1.73M2 — SIGNIFICANT CHANGE UP
EOSINOPHIL # BLD AUTO: 0.15 K/UL — SIGNIFICANT CHANGE UP (ref 0–0.5)
EOSINOPHIL NFR BLD AUTO: 3.1 % — SIGNIFICANT CHANGE UP (ref 0–6)
GLUCOSE SERPL-MCNC: 128 MG/DL — HIGH (ref 70–99)
HCT VFR BLD CALC: 38.8 % — SIGNIFICANT CHANGE UP (ref 34.5–45)
HGB BLD-MCNC: 12 G/DL — SIGNIFICANT CHANGE UP (ref 11.5–15.5)
IMM GRANULOCYTES # BLD AUTO: 0.02 K/UL — SIGNIFICANT CHANGE UP (ref 0–0.07)
IMM GRANULOCYTES NFR BLD AUTO: 0.4 % — SIGNIFICANT CHANGE UP (ref 0–0.9)
INR BLD: 0.96 RATIO — SIGNIFICANT CHANGE UP (ref 0.85–1.16)
LYMPHOCYTES # BLD AUTO: 1.52 K/UL — SIGNIFICANT CHANGE UP (ref 1–3.3)
LYMPHOCYTES NFR BLD AUTO: 31.3 % — SIGNIFICANT CHANGE UP (ref 13–44)
MCHC RBC-ENTMCNC: 25.2 PG — LOW (ref 27–34)
MCHC RBC-ENTMCNC: 30.9 G/DL — LOW (ref 32–36)
MCV RBC AUTO: 81.5 FL — SIGNIFICANT CHANGE UP (ref 80–100)
MONOCYTES # BLD AUTO: 0.36 K/UL — SIGNIFICANT CHANGE UP (ref 0–0.9)
MONOCYTES NFR BLD AUTO: 7.4 % — SIGNIFICANT CHANGE UP (ref 2–14)
NEUTROPHILS # BLD AUTO: 2.76 K/UL — SIGNIFICANT CHANGE UP (ref 1.8–7.4)
NEUTROPHILS NFR BLD AUTO: 57 % — SIGNIFICANT CHANGE UP (ref 43–77)
NRBC # BLD AUTO: 0 K/UL — SIGNIFICANT CHANGE UP (ref 0–0)
NRBC # FLD: 0 K/UL — SIGNIFICANT CHANGE UP (ref 0–0)
NRBC BLD AUTO-RTO: 0 /100 WBCS — SIGNIFICANT CHANGE UP (ref 0–0)
PLATELET # BLD AUTO: 184 K/UL — SIGNIFICANT CHANGE UP (ref 150–400)
PMV BLD: 9.2 FL — SIGNIFICANT CHANGE UP (ref 7–13)
POTASSIUM SERPL-MCNC: 3.9 MMOL/L — SIGNIFICANT CHANGE UP (ref 3.5–5.3)
POTASSIUM SERPL-SCNC: 3.9 MMOL/L — SIGNIFICANT CHANGE UP (ref 3.5–5.3)
PROT SERPL-MCNC: 7.4 GM/DL — SIGNIFICANT CHANGE UP (ref 6–8.3)
PROTHROM AB SERPL-ACNC: 11.3 SEC — SIGNIFICANT CHANGE UP (ref 9.9–13.4)
RBC # BLD: 4.76 M/UL — SIGNIFICANT CHANGE UP (ref 3.8–5.2)
RBC # FLD: 14.6 % — HIGH (ref 10.3–14.5)
SODIUM SERPL-SCNC: 139 MMOL/L — SIGNIFICANT CHANGE UP (ref 135–145)
WBC # BLD: 4.85 K/UL — SIGNIFICANT CHANGE UP (ref 3.8–10.5)
WBC # FLD AUTO: 4.85 K/UL — SIGNIFICANT CHANGE UP (ref 3.8–10.5)

## 2025-04-21 PROCEDURE — 86850 RBC ANTIBODY SCREEN: CPT

## 2025-04-21 PROCEDURE — 80053 COMPREHEN METABOLIC PANEL: CPT

## 2025-04-21 PROCEDURE — 93005 ELECTROCARDIOGRAM TRACING: CPT

## 2025-04-21 PROCEDURE — 74177 CT ABD & PELVIS W/CONTRAST: CPT | Mod: 26

## 2025-04-21 PROCEDURE — 86901 BLOOD TYPING SEROLOGIC RH(D): CPT

## 2025-04-21 PROCEDURE — 99285 EMERGENCY DEPT VISIT HI MDM: CPT | Mod: 25

## 2025-04-21 PROCEDURE — 74177 CT ABD & PELVIS W/CONTRAST: CPT | Mod: MC

## 2025-04-21 PROCEDURE — 99285 EMERGENCY DEPT VISIT HI MDM: CPT

## 2025-04-21 PROCEDURE — 71260 CT THORAX DX C+: CPT | Mod: MC

## 2025-04-21 PROCEDURE — 72125 CT NECK SPINE W/O DYE: CPT | Mod: 26

## 2025-04-21 PROCEDURE — 36415 COLL VENOUS BLD VENIPUNCTURE: CPT

## 2025-04-21 PROCEDURE — 85610 PROTHROMBIN TIME: CPT

## 2025-04-21 PROCEDURE — 70450 CT HEAD/BRAIN W/O DYE: CPT | Mod: MC

## 2025-04-21 PROCEDURE — 72125 CT NECK SPINE W/O DYE: CPT | Mod: MC

## 2025-04-21 PROCEDURE — 86900 BLOOD TYPING SEROLOGIC ABO: CPT

## 2025-04-21 PROCEDURE — 85025 COMPLETE CBC W/AUTO DIFF WBC: CPT

## 2025-04-21 PROCEDURE — 96374 THER/PROPH/DIAG INJ IV PUSH: CPT | Mod: XU

## 2025-04-21 PROCEDURE — 93010 ELECTROCARDIOGRAM REPORT: CPT

## 2025-04-21 PROCEDURE — 71260 CT THORAX DX C+: CPT | Mod: 26

## 2025-04-21 PROCEDURE — 70450 CT HEAD/BRAIN W/O DYE: CPT | Mod: 26

## 2025-04-21 PROCEDURE — 85730 THROMBOPLASTIN TIME PARTIAL: CPT

## 2025-04-21 RX ORDER — ACETAMINOPHEN 500 MG/5ML
1000 LIQUID (ML) ORAL ONCE
Refills: 0 | Status: COMPLETED | OUTPATIENT
Start: 2025-04-21 | End: 2025-04-21

## 2025-04-21 RX ADMIN — Medication 400 MILLIGRAM(S): at 11:36
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
## 2025-04-21 NOTE — ED PROVIDER NOTE - PATIENT PORTAL LINK FT
You can access the FollowMyHealth Patient Portal offered by [facility] by registering at the following website: [email or web address]. By joining [employer]’s FollowMyHealth portal, you will also be able to view your health information using other applications (apps) compatible with our system.
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
## 2025-04-21 NOTE — ED ADULT NURSE NOTE - NSFALLRISKINTERV_ED_ALL_ED

## 2025-04-21 NOTE — ED PROVIDER NOTE - NSFOLLOWUPINSTRUCTIONS_ED_ALL_ED_FT
Dolor de espalda    CUIDADO AMBULATORIO:    Dolor en la espaldaes común. Usted puede tener dolor de espalda y espasmos musculares. Usted puede estar adolorido o sentir rigidez en sam o ambos lados de la espalda. El dolor se puede propagar a la parte baja del cuerpo. Las condiciones que afectan la columna, las articulaciones, o los músculos pueden causar el dolor de espalda. Estos pueden incluir la artritis, estenosis de la james dorsal (estrechamiento de la columna vertebral), tensión muscular o descomposición de los discos de la columna vertebral.    Llame al número de emergencias local (911 en los [place]) si:    Usted tiene dolor de espalda intenso con dolor en el pecho.    Usted no puede controlar suggs orina ni irene deposiciones intestinales.    El dolor se vuelve tan intenso que lo incapacita para caminar.  Busque atención médica de inmediato si:    Usted tiene dolor, entumecimiento o debilidad en scooter o en ambas piernas.    Usted tiene dolor de espalda intenso, náuseas y vómito.    Usted tiene un dolor de espalda intenso que se propaga a un costado o al área genital.  Llame a suggs médico si:    Usted tiene dolor de espalda que no mejora con el reposo, ni con el medicamento para el dolor.    Tiene fiebre.    Usted tiene un dolor que empeora cuando está acostado boca arriba o al descansar.    Usted tiene dolor que empeora cuando tose o estornuda.    Usted pierde peso sin proponérselo.    Usted tiene preguntas o inquietudes acerca de suggs condición o cuidado.  Medicamentos:Es posible que usted necesite alguno de los siguientes:    AINEpueden disminuir la inflamación y el dolor o la fiebre. Kristine medicamento está disponible con o sin scooter receta médica. Los CATE pueden causar sangrado estomacal o problemas renales en ciertas personas. Si usted ella un medicamento anticoagulante, siempre pregúntele a suggs médico si los CATE son seguros para usted. Siempre samara la etiqueta de kristine medicamento y siga las instrucciones.    Acetaminofénalivia el dolor y baja la fiebre. Está disponible sin receta médica. Pregunte la cantidad y la frecuencia con que debe tomarlos. Siga las indicaciones. Samara las etiquetas de todos los demás medicamentos que esté usando para saber si también contienen acetaminofén, o pregunte a suggs médico o farmacéutico. El acetaminofén puede causar daño en el hígado cuando no se ella de forma correcta.    Relajantes muscularesayudan a disminuir los espasmos musculares y el dolor de espalda.    Matador irene medicamentos fifi se le haya indicado.Consulte con suggs médico si usted megan que suggs medicamento no le está ayudando o si presenta efectos secundarios. Infórmele al médico si usted es alérgico a algún medicamento. Mantenga scooter lista actualizada de los medicamentos, las vitaminas y los productos herbales que ella. Incluya los siguientes datos de los medicamentos: cantidad, frecuencia y motivo de administración. Traiga con usted la lista o los envases de las píldoras a irene citas de seguimiento. Lleve la lista de los medicamentos con usted en vignesh de scooter emergencia.  Controlando suggs dolor de espalda:    Aplique hieloen la espalda de 15 a 20 minutos cada hora o fifi se le indique. Use scooter compresa de hielo o ponga hielo triturado en scooter bolsa de plástico. Cúbralo con scooter toalla antes de aplicarlo sobre suggs piel. El hielo disminuye el dolor y ayuda a evitar el daño en los tejidos.    Aplique caloren la espalda de 20 a 30 minutos cada 2 horas por los días que le indiquen. El calor ayuda a disminuir el dolor y los espasmos musculares.    Manténgase activolo más que pueda sin causar más dolor. El reposo en cama puede empeorar sugsg dolor de espalda. Evite levantar objetos hasta que ya no tenga dolor.  Anshul diversa caminando fifi ejercicio      Vaya a terapia físicasegún las indicaciones. Un fisioterapeuta puede enseñarle ejercicios que contribuyan a mejorar suggs movimiento y fuerza, y a aliviar el dolor.  Acuda a scooter consulta de control con suggs médico dentro de 2 semanas, o según le hayan indicado:Es posible que necesite sagrario a un especialista. Anote irene preguntas para que se acuerde de hacerlas sarwat irene visitas.
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
## 2025-04-22 ENCOUNTER — APPOINTMENT (OUTPATIENT)
Dept: HOME HEALTH SERVICES | Facility: HOME HEALTH | Age: 70
End: 2025-04-22

## 2025-05-18 NOTE — DISCHARGE NOTE ADULT - HOSPITAL COURSE
Problem: At Risk for Falls  Goal: Patient does not fall  Outcome: Monitoring/Evaluating progress     Problem: Diabetes  Goal: Achieves glycemic balance  Description: Goal is to maintain blood sugar within range with no episodes of hypoglycemia  Outcome: Monitoring/Evaluating progress     Problem: Skin Integrity Alteration  Goal: Skin remains intact with no new/deterioration of wound or pressure injury  Outcome: Monitoring/Evaluating progress     Problem: Pain  Goal: Acceptable pain level achieved/maintained at rest using appropriate pain scale for the patient  Outcome: Monitoring/Evaluating progress      62y/o  woman w/ PMHx HTN,  DM II, HLD with chronic back pain who presented  to the ED  c/o  worsening  left  leg pain with difficulty ambulating  x4 days.  She c/o  leg pain associated w/ warmth, numbness, tingling. going down L  leg.   She reports she did see Neurosurgery and was told she may need surgery.  She denied bowel/bladder incontinence but has chronic  constipation and hemorrhoids.    She denies  recent traumas or falls.   Pt was unable to ambulate in the ED due to weakness.   Pt has L spine  CT done in ED and has severe spinal stenosis. Pt was admitted for pain control and further evaluation. NeuroSx evaluated pt and started on IV Decadron, also Pt was  getting pain meds and Neurontin w/o improvement of symptoms. Pt was offered and underwent Spinal Sx, had Laminectomy and decompression. Tolerated well with great improvement of pain. Able to ambulate with walker. Today report  some LE pain, controlled with meds,  good appetite. + Voids, no BM yet.   Vital Signs Last 24 Hrs  T(C): 36.8 (31 Jan 2019 10:56), Max: 37.1 (30 Jan 2019 17:08)  T(F): 98.3 (31 Jan 2019 10:56), Max: 98.7 (30 Jan 2019 17:08)  HR: 66 (31 Jan 2019 10:56) (55 - 89)  BP: 145/68 (31 Jan 2019 10:56) (134/58 - 157/68)  RR: 16 (31 Jan 2019 10:56) (16 - 16)  SpO2: 97% (31 Jan 2019 10:56) (94% - 97%)  PHYSICAL EXAM:  General: Well developed; well nourished; NAD   Eyes: PERRLA, EOMI; conjunctiva and sclera clear  Head: Normocephalic; atraumatic  ENMT: No nasal discharge; airway clear  Neck: Supple; non tender; no masses  Respiratory: Good air entry. No wheezes, rales or rhonchi  Cardiovascular: Regular rate and rhythm. S1 and S2 Normal; No murmurs  Gastrointestinal: Soft non-tender non-distended; Normal bowel sounds  Genitourinary: No costovertebral angle tenderness  Extremities: decreased range of motion LLE due to pain,   no edema  Vascular: Peripheral pulses palpable 2+ bilaterally  Neurological: Alert and oriented x4, non focal   Skin: Warm and dry. No acute rash  Lymph Nodes: No acute cervical adenopathy  Musculoskeletal: Normal muscle tone, without deformities. BAck dressing D/C/I  Psychiatric: Cooperative and appropriate    A/p:     I.  Acute on chronic lower back pain, sciatica, Inability to ambulate. S/p Laminectomy POD#2   - CT reviewed.   - Was on decadron, d/c   - C/w pain meds  - c/w  Neurontin    - PT      II. DM   -HB A1c 8.5   -restart  oral hypoglycemics   - diabetic diet       III. HTN  - cont meds: on amlodipine and Metoprolol     IV. HLD  - c/w Lipitor     V. B/l calf tenderness  - c Dopplers: neg for  DVT     VI. GERD  Protonix      VII. Mild leukocytosis likely  reactive, resolved   No fever     VIII. Constipation  C/w Bowel regimen  Monitor for BM     Dispo: Stable for d/c for CRESCENCIO today     D/w CW and NeuroSx Team   Total time 40min   Fax d/c summary to PCP

## 2025-06-17 ENCOUNTER — APPOINTMENT (OUTPATIENT)
Dept: HOME HEALTH SERVICES | Facility: HOME HEALTH | Age: 70
End: 2025-06-17

## 2025-07-28 ENCOUNTER — EMERGENCY (EMERGENCY)
Facility: HOSPITAL | Age: 70
LOS: 0 days | Discharge: HOME | End: 2025-07-28
Attending: EMERGENCY MEDICINE
Payer: MEDICARE

## 2025-07-28 VITALS
RESPIRATION RATE: 16 BRPM | DIASTOLIC BLOOD PRESSURE: 71 MMHG | SYSTOLIC BLOOD PRESSURE: 131 MMHG | OXYGEN SATURATION: 96 % | TEMPERATURE: 98 F | HEART RATE: 97 BPM

## 2025-07-28 VITALS
DIASTOLIC BLOOD PRESSURE: 44 MMHG | RESPIRATION RATE: 18 BRPM | OXYGEN SATURATION: 94 % | WEIGHT: 216.93 LBS | HEART RATE: 76 BPM | TEMPERATURE: 98 F | SYSTOLIC BLOOD PRESSURE: 139 MMHG

## 2025-07-28 DIAGNOSIS — R42 DIZZINESS AND GIDDINESS: ICD-10-CM

## 2025-07-28 DIAGNOSIS — E78.00 PURE HYPERCHOLESTEROLEMIA, UNSPECIFIED: ICD-10-CM

## 2025-07-28 DIAGNOSIS — Z98.890 OTHER SPECIFIED POSTPROCEDURAL STATES: Chronic | ICD-10-CM

## 2025-07-28 DIAGNOSIS — Z98.49 CATARACT EXTRACTION STATUS, UNSPECIFIED EYE: Chronic | ICD-10-CM
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
## 2025-07-28 DIAGNOSIS — I10 ESSENTIAL (PRIMARY) HYPERTENSION: ICD-10-CM

## 2025-07-28 DIAGNOSIS — E11.9 TYPE 2 DIABETES MELLITUS WITHOUT COMPLICATIONS: ICD-10-CM

## 2025-07-28 DIAGNOSIS — M54.42 LUMBAGO WITH SCIATICA, LEFT SIDE: ICD-10-CM

## 2025-07-28 DIAGNOSIS — M54.50 LOW BACK PAIN, UNSPECIFIED: ICD-10-CM
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
## 2025-07-28 DIAGNOSIS — M25.552 PAIN IN LEFT HIP: ICD-10-CM

## 2025-07-28 LAB
ALBUMIN SERPL ELPH-MCNC: 3.3 G/DL — SIGNIFICANT CHANGE UP (ref 3.3–5)
ALP SERPL-CCNC: 119 U/L — SIGNIFICANT CHANGE UP (ref 40–120)
ALT FLD-CCNC: 32 U/L — SIGNIFICANT CHANGE UP (ref 12–78)
ANION GAP SERPL CALC-SCNC: 7 MMOL/L — SIGNIFICANT CHANGE UP (ref 5–17)
AST SERPL-CCNC: 40 U/L — HIGH (ref 15–37)
BASOPHILS # BLD AUTO: 0.02 K/UL — SIGNIFICANT CHANGE UP (ref 0–0.2)
BASOPHILS NFR BLD AUTO: 0.3 % — SIGNIFICANT CHANGE UP (ref 0–2)
BILIRUB SERPL-MCNC: 0.5 MG/DL — SIGNIFICANT CHANGE UP (ref 0.2–1.2)
BUN SERPL-MCNC: 11 MG/DL — SIGNIFICANT CHANGE UP (ref 7–23)
CALCIUM SERPL-MCNC: 9.3 MG/DL — SIGNIFICANT CHANGE UP (ref 8.5–10.1)
CHLORIDE SERPL-SCNC: 106 MMOL/L — SIGNIFICANT CHANGE UP (ref 96–108)
CO2 SERPL-SCNC: 26 MMOL/L — SIGNIFICANT CHANGE UP (ref 22–31)
CREAT SERPL-MCNC: 0.53 MG/DL — SIGNIFICANT CHANGE UP (ref 0.5–1.3)
EGFR: 101 ML/MIN/1.73M2 — SIGNIFICANT CHANGE UP
EGFR: 101 ML/MIN/1.73M2 — SIGNIFICANT CHANGE UP
EOSINOPHIL # BLD AUTO: 0.07 K/UL — SIGNIFICANT CHANGE UP (ref 0–0.5)
EOSINOPHIL NFR BLD AUTO: 1.2 % — SIGNIFICANT CHANGE UP (ref 0–6)
GLUCOSE SERPL-MCNC: 93 MG/DL — SIGNIFICANT CHANGE UP (ref 70–99)
HCT VFR BLD CALC: 38.4 % — SIGNIFICANT CHANGE UP (ref 34.5–45)
HGB BLD-MCNC: 12.1 G/DL — SIGNIFICANT CHANGE UP (ref 11.5–15.5)
IMM GRANULOCYTES # BLD AUTO: 0.01 K/UL — SIGNIFICANT CHANGE UP (ref 0–0.07)
IMM GRANULOCYTES NFR BLD AUTO: 0.2 % — SIGNIFICANT CHANGE UP (ref 0–0.9)
LYMPHOCYTES # BLD AUTO: 1.42 K/UL — SIGNIFICANT CHANGE UP (ref 1–3.3)
LYMPHOCYTES NFR BLD AUTO: 24.1 % — SIGNIFICANT CHANGE UP (ref 13–44)
MAGNESIUM SERPL-MCNC: 1.7 MG/DL — SIGNIFICANT CHANGE UP (ref 1.6–2.6)
MCHC RBC-ENTMCNC: 26.5 PG — LOW (ref 27–34)
MCHC RBC-ENTMCNC: 31.5 G/DL — LOW (ref 32–36)
MCV RBC AUTO: 84.2 FL — SIGNIFICANT CHANGE UP (ref 80–100)
MONOCYTES # BLD AUTO: 0.4 K/UL — SIGNIFICANT CHANGE UP (ref 0–0.9)
MONOCYTES NFR BLD AUTO: 6.8 % — SIGNIFICANT CHANGE UP (ref 2–14)
NEUTROPHILS # BLD AUTO: 3.97 K/UL — SIGNIFICANT CHANGE UP (ref 1.8–7.4)
NEUTROPHILS NFR BLD AUTO: 67.4 % — SIGNIFICANT CHANGE UP (ref 43–77)
NRBC # BLD AUTO: 0 K/UL — SIGNIFICANT CHANGE UP (ref 0–0)
NRBC # FLD: 0 K/UL — SIGNIFICANT CHANGE UP (ref 0–0)
NRBC BLD AUTO-RTO: 0 /100 WBCS — SIGNIFICANT CHANGE UP (ref 0–0)
PHOSPHATE SERPL-MCNC: 2.9 MG/DL — SIGNIFICANT CHANGE UP (ref 2.5–4.5)
PLATELET # BLD AUTO: 223 K/UL — SIGNIFICANT CHANGE UP (ref 150–400)
PMV BLD: 8.9 FL — SIGNIFICANT CHANGE UP (ref 7–13)
POTASSIUM SERPL-MCNC: 4 MMOL/L — SIGNIFICANT CHANGE UP (ref 3.5–5.3)
POTASSIUM SERPL-SCNC: 4 MMOL/L — SIGNIFICANT CHANGE UP (ref 3.5–5.3)
PROT SERPL-MCNC: 7.2 GM/DL — SIGNIFICANT CHANGE UP (ref 6–8.3)
RBC # BLD: 4.56 M/UL — SIGNIFICANT CHANGE UP (ref 3.8–5.2)
RBC # FLD: 16.7 % — HIGH (ref 10.3–14.5)
SODIUM SERPL-SCNC: 139 MMOL/L — SIGNIFICANT CHANGE UP (ref 135–145)
TROPONIN I, HIGH SENSITIVITY RESULT: 4.66 NG/L — SIGNIFICANT CHANGE UP
WBC # BLD: 5.89 K/UL — SIGNIFICANT CHANGE UP (ref 3.8–10.5)
WBC # FLD AUTO: 5.89 K/UL — SIGNIFICANT CHANGE UP (ref 3.8–10.5)

## 2025-07-28 PROCEDURE — 99285 EMERGENCY DEPT VISIT HI MDM: CPT | Mod: 25

## 2025-07-28 PROCEDURE — 84484 ASSAY OF TROPONIN QUANT: CPT

## 2025-07-28 PROCEDURE — 96374 THER/PROPH/DIAG INJ IV PUSH: CPT

## 2025-07-28 PROCEDURE — 86803 HEPATITIS C AB TEST: CPT

## 2025-07-28 PROCEDURE — 74176 CT ABD & PELVIS W/O CONTRAST: CPT | Mod: 26

## 2025-07-28 PROCEDURE — 93005 ELECTROCARDIOGRAM TRACING: CPT

## 2025-07-28 PROCEDURE — 83735 ASSAY OF MAGNESIUM: CPT

## 2025-07-28 PROCEDURE — 82962 GLUCOSE BLOOD TEST: CPT

## 2025-07-28 PROCEDURE — 70450 CT HEAD/BRAIN W/O DYE: CPT | Mod: 26

## 2025-07-28 PROCEDURE — 99285 EMERGENCY DEPT VISIT HI MDM: CPT

## 2025-07-28 PROCEDURE — 80053 COMPREHEN METABOLIC PANEL: CPT

## 2025-07-28 PROCEDURE — 70450 CT HEAD/BRAIN W/O DYE: CPT

## 2025-07-28 PROCEDURE — 36415 COLL VENOUS BLD VENIPUNCTURE: CPT

## 2025-07-28 PROCEDURE — 85025 COMPLETE CBC W/AUTO DIFF WBC: CPT

## 2025-07-28 PROCEDURE — 84100 ASSAY OF PHOSPHORUS: CPT

## 2025-07-28 PROCEDURE — 93010 ELECTROCARDIOGRAM REPORT: CPT

## 2025-07-28 PROCEDURE — 74176 CT ABD & PELVIS W/O CONTRAST: CPT

## 2025-07-28 RX ORDER — CEFTRIAXONE 500 MG/1
1000 INJECTION, POWDER, FOR SOLUTION INTRAMUSCULAR; INTRAVENOUS ONCE
Refills: 0 | Status: DISCONTINUED | OUTPATIENT
Start: 2025-07-28 | End: 2025-07-28

## 2025-07-28 RX ORDER — METHYLPREDNISOLONE ACETATE 80 MG/ML
1 INJECTION, SUSPENSION INTRA-ARTICULAR; INTRALESIONAL; INTRAMUSCULAR; SOFT TISSUE
Qty: 1 | Refills: 0
Start: 2025-07-28 | End: 2025-08-02

## 2025-07-28 RX ORDER — DEXAMETHASONE 0.5 MG/1
6 TABLET ORAL ONCE
Refills: 0 | Status: COMPLETED | OUTPATIENT
Start: 2025-07-28 | End: 2025-07-28

## 2025-07-28 RX ORDER — AZITHROMYCIN 250 MG
500 CAPSULE ORAL ONCE
Refills: 0 | Status: DISCONTINUED | OUTPATIENT
Start: 2025-07-28 | End: 2025-07-28

## 2025-07-28 RX ORDER — CYCLOBENZAPRINE HYDROCHLORIDE 15 MG/1
1 CAPSULE, EXTENDED RELEASE ORAL
Qty: 15 | Refills: 0
Start: 2025-07-28 | End: 2025-08-01

## 2025-07-28 RX ORDER — CYCLOBENZAPRINE HYDROCHLORIDE 15 MG/1
10 CAPSULE, EXTENDED RELEASE ORAL ONCE
Refills: 0 | Status: COMPLETED | OUTPATIENT
Start: 2025-07-28 | End: 2025-07-28

## 2025-07-28 RX ADMIN — CYCLOBENZAPRINE HYDROCHLORIDE 10 MILLIGRAM(S): 15 CAPSULE, EXTENDED RELEASE ORAL at 15:27

## 2025-07-28 RX ADMIN — Medication 1000 MILLILITER(S): at 15:27

## 2025-07-28 RX ADMIN — DEXAMETHASONE 6 MILLIGRAM(S): 0.5 TABLET ORAL at 15:26

## 2025-07-28 NOTE — ED STATDOCS - PHYSICAL EXAMINATION
Constitutional: NAD AAOx3  Eyes: EOMI, pupils equal  Head: Normocephalic atraumatic  Mouth: no airway obstruction  Cardiac: regular rate   Resp: Lungs CTAB  GI: Abd s/nt/nd,   Neuro: CN2-12 intact  Skin: No rashes  MSK: distal neurovascular intact LE

## 2025-07-28 NOTE — ED ADULT TRIAGE NOTE - CHIEF COMPLAINT QUOTE
pt presents to the ED for lower back pain to left leg pain. pt states she fell 3 months ago onto buttocks  .but pain started 2 weeks ago. nkda no other complaints at this time. pt is ambulaotry to triage

## 2025-07-28 NOTE — ED STATDOCS - NSFOLLOWUPINSTRUCTIONS_ED_ALL_ED_FT
La ciática    LO QUE NECESITA SABER:    ¿Qué es la ciática?La ciática es scooter condición que causa dolor en el nervio ciático. El nervio ciático sale de la james dorsal y corre por ambos lados de los glúteos. Luego baja por la parte posterior del muslo, la parte inferior de la pierna y el pie. El nervio ciático puede estar comprimido, inflamado, irritado o estirado en alguna parte.  La ciática    ¿Qué causa la ciática?La ciática puede estar relacionada con ciertas actividades, francine postura y tensión física o psicológica. Cualquiera de los siguientes factores puede causar o incrementar suggs riesgo de tener ciática:    Scooter hernia discal o un estrechamiento de la columna vertebral que presiona el nervio ciático    Scooter lesión muscular después de torcerse o levantar un objeto pesado.    Scooter inflamación por un esguince o la irritación muscular que presiona el nervio ciático    Exceso de peso que aumenta la presión en la espalda y las piernas    Un golpe directo en los glúteos, los muslos o las piernas, accidentes automovilísticos o caídas que lesionan el nervio ciático    Scooter enfermedad de la columna vertebral, fifi artritis, osteoporosis o cáncer  ¿Cuáles son los signos y síntomas de la ciática?La ciática puede tener síntomas a corto o a juana plazo:    Dolor que comienza en la parte inferior de la espalda y baja por los glúteos y la parte posterior del muslo    Pérdida de la sensación u hormigueo en los glúteos y las piernas    Debilidad muscular, dificultad para moverse o para controlar la pierna o el pie    Dolor en las piernas que aumenta cuando está de pie, sentado o en cuclillas  ¿Cómo se diagnostica la ciática?Suggs médico le preguntará sobre irene otras condiciones médicas. Informe a suggs médico sobre sgugs trabajo, irene antecedentes de dolor en la espalda, enfermedades o cirugías que ha tenido. Suggs médico lo examinará y le moverá las piernas para comprobar qué hace que el dolor aumente. Es posible que también necesite alguno de los siguientes tratamientos:    Las radiografíasde la espalda, la cadera, el muslo o la pierna pueden mostrar otros problemas, fifi fracturas (huesos rotos).    Scooter tomografía computarizada (TC) o scooter imagen por resonancia magnética (IRM)pueden mostrar imágenes del nervio ciático, los músculos y los vasos sanguíneos. Podrían administrarle líquido de contraste para que el área se rodrigo mejor en las imágenes. Dígale al médico si usted alguna vez ha tenido scooter reacción alérgica al líquido de contraste. No entre a la margot donde se realiza la resonancia magnética con algo de metal. El metal puede causar lesiones serias. Dígale al médico si usted tiene algo de metal dentro de suggs cuerpo o por encima.    Scooter electromiografía (EMG)es un examen que mide la actividad eléctrica de irene músculos en descanso y en movimiento.    Las pruebas de conducción nerviosacomprueban la manera en que los nervios superficiales y los músculos relacionados reaccionan a la estimulación. Se colocan electrodos con cables o agujas diminutas en ciertas áreas, fifi los glúteos y las piernas.  ¿Cuál es el tratamiento para la ciática?    AINEcomo el ibuprofeno, ayudan a disminuir la inflamación, el dolor y la fiebre. Kristine medicamento está disponible con o sin scooter receta médica. Los CATE pueden causar sangrado estomacal o problemas renales en ciertas personas. Si usted ella un medicamento anticoagulante, siempre pregúntele a suggs médico si los CATE son seguros para usted. Siempre samara la etiqueta de kristine medicamento y siga las instrucciones.    Acetaminofénalivia el dolor y baja la fiebre. Está disponible sin receta médica. Pregunte la cantidad y la frecuencia con que debe tomarlos. Siga las indicaciones. Samara las etiquetas de todos los demás medicamentos que esté usando para saber si también contienen acetaminofén, o pregunte a suggs médico o farmacéutico. El acetaminofén puede causar daño en el hígado cuando no se ella de forma correcta.    Relajantes muscularesayudan a reducir dolor y espasmos musculares.    La terapia de ultrasonidoes scooter máquina que emplea ondas sonoras para aliviar el dolor. Es posible que se use además un medicamento tópico para contribuir a calmar el dolor y la inflamación.    Los medicamentos esteroideos epiduralespueden incluir tanto un anestésico (medicamento para adormecer) fifi un esteroide. Un esteroide puede disminuir la hinchazón y aliviar el dolor. Se administra en forma de inyección cerca de la james dorsal, en el área donde siente el dolor.    Quimionucleólisises scooter inyección que se administra en el disco afectado para ablandarlo o encogerlo.    La cirugíapuede hacerse para corregir problemas fifi un disco dañado o un tumor en la columna. La cirugía puede realizarse para disminuir la presión en el nervio ciático. Los médicos también pueden liberar los músculos que están presionando el nervio ciático.  ¿Cómo puedo ayudar a controlar la ciática?    La fisioterapia o la terapia ocupacionalpodría recomendarse. Un fisioterapeuta le puede enseñar ejercicios para ayudarle a mejorar el movimiento y la fuerza, y para disminuir el dolor. Un terapeuta ocupacional le enseña habilidades para ayudarlo con irene actividades diarias.    Los dispositivos de asistenciapueden hacer que se sienta más cómodo o aliviar la presión en la niall. Es posible que necesite un soporte para la espalda, fifi scooter abrazadera para la espalda. Puede que necesite muletas, un bastón o un andador para disminuir la presión en los músculos de la parte inferior de la espalda y las piernas. Pregunte a suggs médico por más información sobre los dispositivos de asistencia y cómo se usan de forma correcta.  ¿Cómo se puede prevenir la ciática?    Evite la presión en la espalda y las piernas.No levante objetos pesados, ni esté de pie o sentado por períodos prolongados de tiempo.    Levante los objetos de manera cunha.Mantenga la espalda derecha y doble las rodillas para alzar un objeto. No doble ni tuerza la espalda cuando levante algo.  Cómo levantar objetos de forma cunha      Mantenga un peso saludable.Pregúntele a suggs médico cuál es el peso ideal para usted. Suggs médico puede ayudarlo a elaborar un plan para perder peso, si lo necesita.    Ejercítese según indicaciones.Pídale a suggs médico que le recomiende el programa de estiramiento, calentamiento y ejercicio más apropiado para usted.  ¿Cuándo jorge l buscar atención inmediata?    Usted tiene problemas para controlar la orina o las evacuaciones intestinales.    Tiene debilidad en ambas piernas.    Pierde la sensación en la jere o los glúteos.  ¿Cuándo jorgel  llamar a mi médico?    Le duele la parte inferior de la espalda por la noche o cuando descansa.    Le duele la parte inferior de la espalda y se le adormece la pierna por debajo de la rodilla.    Tiene debilidad en scooter pierna solamente.    Usted tiene preguntas o inquietudes acerca de suggs condición o cuidado.

## 2025-07-28 NOTE — ED STATDOCS - PROGRESS NOTE DETAILS
70-year-old female with a past medical history of high blood pressure, high cholesterol, diabetes) does not check her sugar regularly and has not seen her primary care doctor in quite some time) presents with left lower back/hip pain for approximately 2 to 3 weeks radiating down her left lower extremity.  Patient states that she fell approximately 6 months ago and landed on her bottom and is unsure if the symptoms are from that the recent fall.  Denies any other injuries.  Patient states that she feels slightly dizzy this morning and wants to make sure that she did not feel dizzy anymore.  No midline TTP.  Mild left lower back TTP.  No rashes noted.  Will obtain labs, CT of the head for dizziness, CT of the abdomen pelvis to evaluate bony structures of the lower back/hip, medications for pain and reevaluate.  -Jonny Ordonez PA-C Using  [id number] informed patient's of her results.  Labs and CT of the head were unremarkable.  CT abdomen pelvis showed an incidental small fat-containing femoral hernia which patient was made aware of that she can follow-up as an outpatient.  Otherwise, CT did not show any other cause of patient's pain.  Symptoms likely related to sciatica.  Advised patient to take Motrin/Tylenol With pain, will also prescribe Flexeril and a Medrol Dosepak, and recommended that patient follow-up with her primary care doctor. Informed pt that she should have family or friends around her while taking the muscle relaxer.  -Jonny Ordonez PA-C

## 2025-07-28 NOTE — ED STATDOCS - CLINICAL SUMMARY MEDICAL DECISION MAKING FREE TEXT BOX
69 y/o female with HX of HTN, HLD, and DM presents to ED with LLQ back pain for two to three weeks. States she fell onto back 6 months ago. Endorses feeling dizzy this morning. Took Tylenol which she states has helped minimally, will check labs and CT scan.

## 2025-07-28 NOTE — ED STATDOCS - OBJECTIVE STATEMENT
Used: #[id number]  71 y/o female with HX of HTN, HLD, and DM presents to ED with LLQ back pain for two to three weeks. States she fell onto back 6 months ago. Endorses feeling dizzy this morning. Took Tylenol which she states has helped minimally.

## 2025-07-28 NOTE — ED STATDOCS - NSFOLLOWUPCLINICS_GEN_ALL_ED_FT
[facility] [place] [id number] [place] [address]  Phone: [phone]  Fax:

## 2025-07-28 NOTE — ED STATDOCS - PATIENT PORTAL LINK FT
You can access the FollowMyHealth Patient Portal offered by [facility] by registering at the following website: [email or web address]. By joining [employer]’s FollowMyHealth portal, you will also be able to view your health information using other applications (apps) compatible with our system.

## 2025-07-29 LAB
HCV AB S/CO SERPL IA: 0.12 S/CO — SIGNIFICANT CHANGE UP (ref 0–0.79)
HCV AB SERPL-IMP: SIGNIFICANT CHANGE UP

## 2025-08-29 ENCOUNTER — APPOINTMENT (OUTPATIENT)
Dept: HOME HEALTH SERVICES | Facility: HOME HEALTH | Age: 70
End: 2025-08-29

## 2025-09-12 ENCOUNTER — EMERGENCY (EMERGENCY)
Facility: HOSPITAL | Age: 70
LOS: 0 days | Discharge: HOME | End: 2025-09-12
Attending: EMERGENCY MEDICINE
Payer: MEDICARE

## 2025-09-12 VITALS
SYSTOLIC BLOOD PRESSURE: 185 MMHG | DIASTOLIC BLOOD PRESSURE: 99 MMHG | OXYGEN SATURATION: 96 % | HEART RATE: 96 BPM | RESPIRATION RATE: 18 BRPM | TEMPERATURE: 98 F | WEIGHT: 223.33 LBS

## 2025-09-12 VITALS
OXYGEN SATURATION: 98 % | DIASTOLIC BLOOD PRESSURE: 66 MMHG | SYSTOLIC BLOOD PRESSURE: 156 MMHG | TEMPERATURE: 98 F | RESPIRATION RATE: 17 BRPM | HEART RATE: 71 BPM

## 2025-09-12 DIAGNOSIS — R60.0 LOCALIZED EDEMA: ICD-10-CM

## 2025-09-12 DIAGNOSIS — R06.02 SHORTNESS OF BREATH: ICD-10-CM

## 2025-09-12 DIAGNOSIS — Z98.49 CATARACT EXTRACTION STATUS, UNSPECIFIED EYE: Chronic | ICD-10-CM

## 2025-09-12 DIAGNOSIS — R53.1 WEAKNESS: ICD-10-CM

## 2025-09-12 DIAGNOSIS — Z98.890 OTHER SPECIFIED POSTPROCEDURAL STATES: Chronic | ICD-10-CM

## 2025-09-12 LAB
ALBUMIN SERPL ELPH-MCNC: 3.6 G/DL — SIGNIFICANT CHANGE UP (ref 3.3–5)
ALP SERPL-CCNC: 128 U/L — HIGH (ref 40–120)
ALT FLD-CCNC: 43 U/L — SIGNIFICANT CHANGE UP (ref 12–78)
ANION GAP SERPL CALC-SCNC: 7 MMOL/L — SIGNIFICANT CHANGE UP (ref 5–17)
AST SERPL-CCNC: 42 U/L — HIGH (ref 15–37)
BASOPHILS # BLD AUTO: 0.03 K/UL — SIGNIFICANT CHANGE UP (ref 0–0.2)
BASOPHILS NFR BLD AUTO: 0.4 % — SIGNIFICANT CHANGE UP (ref 0–2)
BILIRUB SERPL-MCNC: 0.5 MG/DL — SIGNIFICANT CHANGE UP (ref 0.2–1.2)
BUN SERPL-MCNC: 10 MG/DL — SIGNIFICANT CHANGE UP (ref 7–23)
CALCIUM SERPL-MCNC: 9.3 MG/DL — SIGNIFICANT CHANGE UP (ref 8.5–10.1)
CHLORIDE SERPL-SCNC: 103 MMOL/L — SIGNIFICANT CHANGE UP (ref 96–108)
CO2 SERPL-SCNC: 25 MMOL/L — SIGNIFICANT CHANGE UP (ref 22–31)
CREAT SERPL-MCNC: 0.6 MG/DL — SIGNIFICANT CHANGE UP (ref 0.5–1.3)
EGFR: 98 ML/MIN/1.73M2 — SIGNIFICANT CHANGE UP
EGFR: 98 ML/MIN/1.73M2 — SIGNIFICANT CHANGE UP
EOSINOPHIL # BLD AUTO: 0.05 K/UL — SIGNIFICANT CHANGE UP (ref 0–0.5)
EOSINOPHIL NFR BLD AUTO: 0.7 % — SIGNIFICANT CHANGE UP (ref 0–6)
GLUCOSE SERPL-MCNC: 145 MG/DL — HIGH (ref 70–99)
HCT VFR BLD CALC: 39 % — SIGNIFICANT CHANGE UP (ref 34.5–45)
HGB BLD-MCNC: 12.8 G/DL — SIGNIFICANT CHANGE UP (ref 11.5–15.5)
IMM GRANULOCYTES # BLD AUTO: 0.02 K/UL — SIGNIFICANT CHANGE UP (ref 0–0.07)
IMM GRANULOCYTES NFR BLD AUTO: 0.3 % — SIGNIFICANT CHANGE UP (ref 0–0.9)
LYMPHOCYTES # BLD AUTO: 1.46 K/UL — SIGNIFICANT CHANGE UP (ref 1–3.3)
LYMPHOCYTES NFR BLD AUTO: 20.7 % — SIGNIFICANT CHANGE UP (ref 13–44)
MCHC RBC-ENTMCNC: 27.8 PG — SIGNIFICANT CHANGE UP (ref 27–34)
MCHC RBC-ENTMCNC: 32.8 G/DL — SIGNIFICANT CHANGE UP (ref 32–36)
MCV RBC AUTO: 84.8 FL — SIGNIFICANT CHANGE UP (ref 80–100)
MONOCYTES # BLD AUTO: 0.47 K/UL — SIGNIFICANT CHANGE UP (ref 0–0.9)
MONOCYTES NFR BLD AUTO: 6.7 % — SIGNIFICANT CHANGE UP (ref 2–14)
NEUTROPHILS # BLD AUTO: 5.02 K/UL — SIGNIFICANT CHANGE UP (ref 1.8–7.4)
NEUTROPHILS NFR BLD AUTO: 71.2 % — SIGNIFICANT CHANGE UP (ref 43–77)
NRBC # BLD AUTO: 0 K/UL — SIGNIFICANT CHANGE UP (ref 0–0)
NRBC # FLD: 0 K/UL — SIGNIFICANT CHANGE UP (ref 0–0)
NRBC BLD AUTO-RTO: 0 /100 WBCS — SIGNIFICANT CHANGE UP (ref 0–0)
NT-PROBNP SERPL-SCNC: 171 PG/ML — HIGH (ref 0–125)
PLATELET # BLD AUTO: 217 K/UL — SIGNIFICANT CHANGE UP (ref 150–400)
PMV BLD: 9.6 FL — SIGNIFICANT CHANGE UP (ref 7–13)
POTASSIUM SERPL-MCNC: 3.7 MMOL/L — SIGNIFICANT CHANGE UP (ref 3.5–5.3)
POTASSIUM SERPL-SCNC: 3.7 MMOL/L — SIGNIFICANT CHANGE UP (ref 3.5–5.3)
PROT SERPL-MCNC: 7.2 GM/DL — SIGNIFICANT CHANGE UP (ref 6–8.3)
RBC # BLD: 4.6 M/UL — SIGNIFICANT CHANGE UP (ref 3.8–5.2)
RBC # FLD: 13.6 % — SIGNIFICANT CHANGE UP (ref 10.3–14.5)
SODIUM SERPL-SCNC: 135 MMOL/L — SIGNIFICANT CHANGE UP (ref 135–145)
TROPONIN I, HIGH SENSITIVITY RESULT: 3.52 NG/L — SIGNIFICANT CHANGE UP
WBC # BLD: 7.05 K/UL — SIGNIFICANT CHANGE UP (ref 3.8–10.5)
WBC # FLD AUTO: 7.05 K/UL — SIGNIFICANT CHANGE UP (ref 3.8–10.5)

## 2025-09-12 PROCEDURE — 85025 COMPLETE CBC W/AUTO DIFF WBC: CPT

## 2025-09-12 PROCEDURE — 36415 COLL VENOUS BLD VENIPUNCTURE: CPT

## 2025-09-12 PROCEDURE — 93970 EXTREMITY STUDY: CPT

## 2025-09-12 PROCEDURE — 84484 ASSAY OF TROPONIN QUANT: CPT

## 2025-09-12 PROCEDURE — 71045 X-RAY EXAM CHEST 1 VIEW: CPT

## 2025-09-12 PROCEDURE — 71045 X-RAY EXAM CHEST 1 VIEW: CPT | Mod: 26

## 2025-09-12 PROCEDURE — 83880 ASSAY OF NATRIURETIC PEPTIDE: CPT

## 2025-09-12 PROCEDURE — 99284 EMERGENCY DEPT VISIT MOD MDM: CPT

## 2025-09-12 PROCEDURE — 80053 COMPREHEN METABOLIC PANEL: CPT

## 2025-09-12 PROCEDURE — 99285 EMERGENCY DEPT VISIT HI MDM: CPT | Mod: 25

## 2025-09-12 PROCEDURE — 93970 EXTREMITY STUDY: CPT | Mod: 26

## 2025-09-12 RX ORDER — ACETAMINOPHEN 500 MG/5ML
1000 LIQUID (ML) ORAL ONCE
Refills: 0 | Status: COMPLETED | OUTPATIENT
Start: 2025-09-12 | End: 2025-09-12

## 2025-09-12 RX ADMIN — Medication 1000 MILLIGRAM(S): at 14:13

## 2025-09-13 ENCOUNTER — NON-APPOINTMENT (OUTPATIENT)
Age: 70
End: 2025-09-13